# Patient Record
Sex: FEMALE | Race: WHITE | NOT HISPANIC OR LATINO | Employment: OTHER | URBAN - METROPOLITAN AREA
[De-identification: names, ages, dates, MRNs, and addresses within clinical notes are randomized per-mention and may not be internally consistent; named-entity substitution may affect disease eponyms.]

---

## 2020-06-23 ENCOUNTER — HOSPITAL ENCOUNTER (EMERGENCY)
Facility: HOSPITAL | Age: 80
Discharge: HOME | End: 2020-06-23
Attending: EMERGENCY MEDICINE | Admitting: EMERGENCY MEDICINE

## 2020-06-23 ENCOUNTER — APPOINTMENT (OUTPATIENT)
Dept: CT IMAGING | Facility: HOSPITAL | Age: 80
End: 2020-06-23

## 2020-06-23 VITALS
SYSTOLIC BLOOD PRESSURE: 108 MMHG | RESPIRATION RATE: 16 BRPM | TEMPERATURE: 98.1 F | WEIGHT: 136.69 LBS | OXYGEN SATURATION: 99 % | DIASTOLIC BLOOD PRESSURE: 62 MMHG | HEIGHT: 60 IN | BODY MASS INDEX: 26.84 KG/M2 | HEART RATE: 66 BPM
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
## 2020-06-23 DIAGNOSIS — S00.03XA CONTUSION OF SCALP, INITIAL ENCOUNTER: ICD-10-CM

## 2020-06-23 PROCEDURE — 99283 EMERGENCY DEPT VISIT LOW MDM: CPT

## 2020-06-23 PROCEDURE — 70450 CT HEAD/BRAIN W/O DYE: CPT

## 2020-06-23 NOTE — DISCHARGE INSTRUCTIONS
Neurochecks every 4 hours return for vomiting altered mental status under good pupils severe headache or any other new or worse problems or concerns

## 2020-06-23 NOTE — ED PROVIDER NOTES
Subjective   Chief complaint fall head injury    History of present illness 80-year-old female who tripped and fell this afternoon striking her head.  No loss of consciousness she denies any complaints of pain but her caregiver wanted to get checked out because she is had a history of previous aneurysm and brain surgery.  Patient offers no complaints no headache no neck pain no numbness or tingling no syncope.  This just happened a few hours ago.          Review of Systems   Constitutional: Negative for chills and fever.   Respiratory: Negative for chest tightness and shortness of breath.    Cardiovascular: Negative for chest pain and leg swelling.   Gastrointestinal: Negative for abdominal pain and vomiting.   Musculoskeletal: Negative for arthralgias and back pain.   Skin: Negative for color change and pallor.   Neurological: Negative for dizziness, speech difficulty and headaches.   Psychiatric/Behavioral: Negative for agitation and behavioral problems.       No past medical history on file.  Previous brain aneurysm repair seizures  No Known Allergies    No past surgical history on file.    No family history on file.    Social History     Socioeconomic History   • Marital status: Single     Spouse name: Not on file   • Number of children: Not on file   • Years of education: Not on file   • Highest education level: Not on file     Prior to Admission medications    Not on File   Patient does not know her medications and does not have them with her        Objective   Physical Exam  80-year-old awake alert no acute distress HEENT no obvious trauma extraocular muscle intact pupils equal and reactive there is no raccoon or bolivar sign no facial trauma.  There is no cervical rest lumbar spine tenderness trachea midline no JVD lungs clear no retractions chest wall nontender good breath sounds bilaterally heart regular without murmur abdomen soft nontender no masses pelvis stable without pain extremities full range of  motion no deformities she is awake alert knows her name knows the month knows where she is at orientated x3 she walks without ataxia there is no focal deficits [place] Coma Scale 15  Procedures           ED Course    No results found for this or any previous visit.  Ct Head Without Contrast    Result Date: 6/23/2020  Postoperative and postprocedural changes from aneurysm treatment. Extensive atrophy. Multiple areas of prior ischemic change. No convincing acute intracranial abnormality.  Electronically Signed By-Ankur Hurtado On:6/23/2020 6:37 PM This report was finalized on [id number] by  Ankur Hurtado, .    Medications - No data to display                                         MDM  Number of Diagnoses or Management Options  Contusion of scalp, initial encounter:   Fall, initial encounter:   Diagnosis management comments: Medical decision making.  Patient had the above exam and evaluation.  The patient's CT scan postoperative changes but nothing acute repeat examination at 7:00 PM she is resting comfortably no distress [place] Coma Scale 15.  She has no complaints of pain.  The patient had no neck pain she was stable to be discharged home for outpatient management we talked about head injury precautions with return for and she voiced understanding      Final diagnoses:   Fall, initial encounter   Contusion of scalp, initial encounter            Tyler Rodrigez MD  06/23/20 0435

## 2020-12-16 ENCOUNTER — APPOINTMENT (OUTPATIENT)
Dept: CT IMAGING | Facility: HOSPITAL | Age: 80
End: 2020-12-16

## 2020-12-16 ENCOUNTER — HOSPITAL ENCOUNTER (OUTPATIENT)
Facility: HOSPITAL | Age: 80
Setting detail: OBSERVATION
Discharge: HOME HEALTH | End: 2020-12-18
Attending: INTERNAL MEDICINE | Admitting: INTERNAL MEDICINE

## 2020-12-16 ENCOUNTER — APPOINTMENT (OUTPATIENT)
Dept: GENERAL RADIOLOGY | Facility: HOSPITAL | Age: 80
End: 2020-12-16

## 2020-12-16 DIAGNOSIS — M48.44XA STRESS FRACTURE OF THORACIC VERTEBRA, INITIAL ENCOUNTER: ICD-10-CM

## 2020-12-16 DIAGNOSIS — R42 DIZZY SPELLS: ICD-10-CM

## 2020-12-16 DIAGNOSIS — F03.90 DEMENTIA WITHOUT BEHAVIORAL DISTURBANCE, UNSPECIFIED DEMENTIA TYPE: ICD-10-CM

## 2020-12-16 DIAGNOSIS — N39.0 URINARY TRACT INFECTION WITHOUT HEMATURIA, SITE UNSPECIFIED: ICD-10-CM
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
## 2020-12-16 LAB
ALBUMIN SERPL-MCNC: 3.7 G/DL (ref 3.5–5.2)
ALBUMIN/GLOB SERPL: 1.3 G/DL
ALP SERPL-CCNC: 70 U/L (ref 39–117)
ALT SERPL W P-5'-P-CCNC: 10 U/L (ref 1–33)
ANION GAP SERPL CALCULATED.3IONS-SCNC: 5 MMOL/L (ref 5–15)
AST SERPL-CCNC: 17 U/L (ref 1–32)
BACTERIA UR QL AUTO: ABNORMAL /HPF
BASOPHILS # BLD AUTO: 0 10*3/MM3 (ref 0–0.2)
BASOPHILS NFR BLD AUTO: 0.6 % (ref 0–1.5)
BILIRUB SERPL-MCNC: 0.2 MG/DL (ref 0–1.2)
BILIRUB UR QL STRIP: NEGATIVE
BUN SERPL-MCNC: 21 MG/DL (ref 8–23)
BUN/CREAT SERPL: 19.8 (ref 7–25)
CALCIUM SPEC-SCNC: 9.1 MG/DL (ref 8.6–10.5)
CHLORIDE SERPL-SCNC: 104 MMOL/L (ref 98–107)
CLARITY UR: ABNORMAL
CO2 SERPL-SCNC: 23 MMOL/L (ref 22–29)
COLOR UR: YELLOW
CREAT SERPL-MCNC: 1.06 MG/DL (ref 0.57–1)
D DIMER PPP FEU-MCNC: 6.16 MG/L (FEU) (ref 0–0.59)
D-LACTATE SERPL-SCNC: 0.5 MMOL/L (ref 0.5–2)
DEPRECATED RDW RBC AUTO: 45.9 FL (ref 37–54)
EOSINOPHIL # BLD AUTO: 0 10*3/MM3 (ref 0–0.4)
EOSINOPHIL NFR BLD AUTO: 0.3 % (ref 0.3–6.2)
ERYTHROCYTE [DISTWIDTH] IN BLOOD BY AUTOMATED COUNT: 13.3 % (ref 12.3–15.4)
GFR SERPL CREATININE-BSD FRML MDRD: 50 ML/MIN/1.73
GLOBULIN UR ELPH-MCNC: 2.8 GM/DL
GLUCOSE SERPL-MCNC: 122 MG/DL (ref 65–99)
GLUCOSE UR STRIP-MCNC: NEGATIVE MG/DL
HCT VFR BLD AUTO: 30.7 % (ref 34–46.6)
HGB BLD-MCNC: 10.3 G/DL (ref 12–15.9)
HGB UR QL STRIP.AUTO: ABNORMAL
HOLD SPECIMEN: NORMAL
HOLD SPECIMEN: NORMAL
HYALINE CASTS UR QL AUTO: ABNORMAL /LPF
KETONES UR QL STRIP: NEGATIVE
LEUKOCYTE ESTERASE UR QL STRIP.AUTO: NEGATIVE
LYMPHOCYTES # BLD AUTO: 0.8 10*3/MM3 (ref 0.7–3.1)
LYMPHOCYTES NFR BLD AUTO: 14.8 % (ref 19.6–45.3)
MCH RBC QN AUTO: 33.2 PG (ref 26.6–33)
MCHC RBC AUTO-ENTMCNC: 33.5 G/DL (ref 31.5–35.7)
MCV RBC AUTO: 98.9 FL (ref 79–97)
MONOCYTES # BLD AUTO: 0.2 10*3/MM3 (ref 0.1–0.9)
MONOCYTES NFR BLD AUTO: 4.2 % (ref 5–12)
NEUTROPHILS NFR BLD AUTO: 4.4 10*3/MM3 (ref 1.7–7)
NEUTROPHILS NFR BLD AUTO: 80.1 % (ref 42.7–76)
NITRITE UR QL STRIP: NEGATIVE
NRBC BLD AUTO-RTO: 0 /100 WBC (ref 0–0.2)
NT-PROBNP SERPL-MCNC: 839.6 PG/ML (ref 0–1800)
PH UR STRIP.AUTO: 6.5 [PH] (ref 5–8)
PLATELET # BLD AUTO: 282 10*3/MM3 (ref 140–450)
PMV BLD AUTO: 7.9 FL (ref 6–12)
POTASSIUM SERPL-SCNC: 4.5 MMOL/L (ref 3.5–5.2)
PROT SERPL-MCNC: 6.5 G/DL (ref 6–8.5)
PROT UR QL STRIP: ABNORMAL
RBC # BLD AUTO: 3.11 10*6/MM3 (ref 3.77–5.28)
RBC # UR: ABNORMAL /HPF
REF LAB TEST METHOD: ABNORMAL
SARS-COV-2 RNA PNL SPEC NAA+PROBE: NORMAL
SODIUM SERPL-SCNC: 132 MMOL/L (ref 136–145)
SP GR UR STRIP: 1.01 (ref 1–1.03)
SQUAMOUS #/AREA URNS HPF: ABNORMAL /HPF
TROPONIN T SERPL-MCNC: <0.01 NG/ML (ref 0–0.03)
UROBILINOGEN UR QL STRIP: ABNORMAL
WBC # BLD AUTO: 5.5 10*3/MM3 (ref 3.4–10.8)
WBC UR QL AUTO: ABNORMAL /HPF
WHOLE BLOOD HOLD SPECIMEN: NORMAL
WHOLE BLOOD HOLD SPECIMEN: NORMAL

## 2020-12-16 PROCEDURE — G0378 HOSPITAL OBSERVATION PER HR: HCPCS

## 2020-12-16 PROCEDURE — 71045 X-RAY EXAM CHEST 1 VIEW: CPT

## 2020-12-16 PROCEDURE — 87086 URINE CULTURE/COLONY COUNT: CPT | Performed by: NURSE PRACTITIONER

## 2020-12-16 PROCEDURE — 83605 ASSAY OF LACTIC ACID: CPT

## 2020-12-16 PROCEDURE — P9612 CATHETERIZE FOR URINE SPEC: HCPCS

## 2020-12-16 PROCEDURE — 93005 ELECTROCARDIOGRAM TRACING: CPT | Performed by: NURSE PRACTITIONER

## 2020-12-16 PROCEDURE — 25010000002 CEFTRIAXONE PER 250 MG: Performed by: NURSE PRACTITIONER

## 2020-12-16 PROCEDURE — 87040 BLOOD CULTURE FOR BACTERIA: CPT | Performed by: NURSE PRACTITIONER

## 2020-12-16 PROCEDURE — 87147 CULTURE TYPE IMMUNOLOGIC: CPT | Performed by: NURSE PRACTITIONER

## 2020-12-16 PROCEDURE — 85379 FIBRIN DEGRADATION QUANT: CPT | Performed by: NURSE PRACTITIONER

## 2020-12-16 PROCEDURE — 96361 HYDRATE IV INFUSION ADD-ON: CPT

## 2020-12-16 PROCEDURE — 96376 TX/PRO/DX INJ SAME DRUG ADON: CPT

## 2020-12-16 PROCEDURE — 87076 CULTURE ANAEROBE IDENT EACH: CPT | Performed by: NURSE PRACTITIONER

## 2020-12-16 PROCEDURE — 80053 COMPREHEN METABOLIC PANEL: CPT | Performed by: NURSE PRACTITIONER

## 2020-12-16 PROCEDURE — 87077 CULTURE AEROBIC IDENTIFY: CPT | Performed by: NURSE PRACTITIONER

## 2020-12-16 PROCEDURE — 84484 ASSAY OF TROPONIN QUANT: CPT | Performed by: NURSE PRACTITIONER

## 2020-12-16 PROCEDURE — 71275 CT ANGIOGRAPHY CHEST: CPT

## 2020-12-16 PROCEDURE — 85025 COMPLETE CBC W/AUTO DIFF WBC: CPT | Performed by: NURSE PRACTITIONER

## 2020-12-16 PROCEDURE — 99285 EMERGENCY DEPT VISIT HI MDM: CPT

## 2020-12-16 PROCEDURE — 70450 CT HEAD/BRAIN W/O DYE: CPT

## 2020-12-16 PROCEDURE — 87150 DNA/RNA AMPLIFIED PROBE: CPT | Performed by: NURSE PRACTITIONER

## 2020-12-16 PROCEDURE — C9803 HOPD COVID-19 SPEC COLLECT: HCPCS

## 2020-12-16 PROCEDURE — 87186 SC STD MICRODIL/AGAR DIL: CPT | Performed by: NURSE PRACTITIONER

## 2020-12-16 PROCEDURE — 0 IOPAMIDOL PER 1 ML: Performed by: NURSE PRACTITIONER

## 2020-12-16 PROCEDURE — 81001 URINALYSIS AUTO W/SCOPE: CPT | Performed by: NURSE PRACTITIONER

## 2020-12-16 PROCEDURE — 87635 SARS-COV-2 COVID-19 AMP PRB: CPT | Performed by: NURSE PRACTITIONER

## 2020-12-16 PROCEDURE — 99220 PR INITIAL OBSERVATION CARE/DAY 70 MINUTES: CPT | Performed by: STUDENT IN AN ORGANIZED HEALTH CARE EDUCATION/TRAINING PROGRAM

## 2020-12-16 PROCEDURE — 83880 ASSAY OF NATRIURETIC PEPTIDE: CPT | Performed by: NURSE PRACTITIONER

## 2020-12-16 RX ORDER — DONEPEZIL HYDROCHLORIDE 5 MG/1
5 TABLET, FILM COATED ORAL NIGHTLY
COMMUNITY

## 2020-12-16 RX ORDER — CARBAMAZEPINE 100 MG/1
150 TABLET, CHEWABLE ORAL 2 TIMES DAILY
COMMUNITY

## 2020-12-16 RX ORDER — HYDROCODONE BITARTRATE AND ACETAMINOPHEN 5; 325 MG/1; MG/1
1 TABLET ORAL EVERY 12 HOURS PRN
COMMUNITY

## 2020-12-16 RX ORDER — GUAIFENESIN AND DEXTROMETHORPHAN HYDROBROMIDE 600; 30 MG/1; MG/1
1 TABLET, EXTENDED RELEASE ORAL 2 TIMES DAILY PRN
Status: ON HOLD | COMMUNITY
End: 2021-10-11

## 2020-12-16 RX ORDER — ONDANSETRON 4 MG/1
4 TABLET, FILM COATED ORAL EVERY 6 HOURS PRN
Status: DISCONTINUED | OUTPATIENT
Start: 2020-12-16 | End: 2020-12-18 | Stop reason: HOSPADM

## 2020-12-16 RX ORDER — AMLODIPINE BESYLATE 5 MG/1
10 TABLET ORAL DAILY
Status: DISCONTINUED | OUTPATIENT
Start: 2020-12-17 | End: 2020-12-18 | Stop reason: HOSPADM

## 2020-12-16 RX ORDER — ONDANSETRON 2 MG/ML
4 INJECTION INTRAMUSCULAR; INTRAVENOUS EVERY 6 HOURS PRN
Status: DISCONTINUED | OUTPATIENT
Start: 2020-12-16 | End: 2020-12-18 | Stop reason: HOSPADM

## 2020-12-16 RX ORDER — SODIUM CHLORIDE 0.9 % (FLUSH) 0.9 %
10 SYRINGE (ML) INJECTION EVERY 12 HOURS SCHEDULED
Status: DISCONTINUED | OUTPATIENT
Start: 2020-12-17 | End: 2020-12-18 | Stop reason: HOSPADM

## 2020-12-16 RX ORDER — ATENOLOL 50 MG/1
50 TABLET ORAL DAILY
Status: DISCONTINUED | OUTPATIENT
Start: 2020-12-17 | End: 2020-12-18 | Stop reason: HOSPADM

## 2020-12-16 RX ORDER — CARBAMAZEPINE 100 MG/1
100 CAPSULE, EXTENDED RELEASE ORAL EVERY 12 HOURS SCHEDULED
Status: DISCONTINUED | OUTPATIENT
Start: 2020-12-17 | End: 2020-12-18 | Stop reason: HOSPADM

## 2020-12-16 RX ORDER — SODIUM CHLORIDE 0.9 % (FLUSH) 0.9 %
10 SYRINGE (ML) INJECTION AS NEEDED
Status: DISCONTINUED | OUTPATIENT
Start: 2020-12-16 | End: 2020-12-18 | Stop reason: HOSPADM

## 2020-12-16 RX ORDER — GUAIFENESIN AND DEXTROMETHORPHAN HYDROBROMIDE 600; 30 MG/1; MG/1
1 TABLET, EXTENDED RELEASE ORAL 2 TIMES DAILY PRN
Status: DISCONTINUED | OUTPATIENT
Start: 2020-12-16 | End: 2020-12-18 | Stop reason: HOSPADM

## 2020-12-16 RX ORDER — ACETAMINOPHEN 650 MG/1
650 SUPPOSITORY RECTAL EVERY 4 HOURS PRN
Status: DISCONTINUED | OUTPATIENT
Start: 2020-12-16 | End: 2020-12-18 | Stop reason: HOSPADM

## 2020-12-16 RX ORDER — HYDROCODONE BITARTRATE AND ACETAMINOPHEN 7.5; 325 MG/1; MG/1
1 TABLET ORAL EVERY 8 HOURS PRN
Status: DISCONTINUED | OUTPATIENT
Start: 2020-12-16 | End: 2020-12-18 | Stop reason: HOSPADM

## 2020-12-16 RX ORDER — ACETAMINOPHEN 325 MG/1
650 TABLET ORAL EVERY 4 HOURS PRN
Status: DISCONTINUED | OUTPATIENT
Start: 2020-12-16 | End: 2020-12-18 | Stop reason: HOSPADM

## 2020-12-16 RX ORDER — ACETAMINOPHEN 160 MG/5ML
650 SOLUTION ORAL EVERY 4 HOURS PRN
Status: DISCONTINUED | OUTPATIENT
Start: 2020-12-16 | End: 2020-12-18 | Stop reason: HOSPADM

## 2020-12-16 RX ORDER — BUDESONIDE AND FORMOTEROL FUMARATE DIHYDRATE 80; 4.5 UG/1; UG/1
2 AEROSOL RESPIRATORY (INHALATION)
Status: DISCONTINUED | OUTPATIENT
Start: 2020-12-17 | End: 2020-12-18 | Stop reason: HOSPADM

## 2020-12-16 RX ORDER — ATENOLOL 50 MG/1
50 TABLET ORAL EVERY MORNING
COMMUNITY

## 2020-12-16 RX ORDER — CHOLECALCIFEROL (VITAMIN D3) 125 MCG
5 CAPSULE ORAL NIGHTLY PRN
Status: DISCONTINUED | OUTPATIENT
Start: 2020-12-16 | End: 2020-12-18 | Stop reason: HOSPADM

## 2020-12-16 RX ORDER — AMLODIPINE BESYLATE 10 MG/1
10 TABLET ORAL EVERY EVENING
COMMUNITY

## 2020-12-16 RX ORDER — DONEPEZIL HYDROCHLORIDE 5 MG/1
5 TABLET, FILM COATED ORAL NIGHTLY
Status: DISCONTINUED | OUTPATIENT
Start: 2020-12-17 | End: 2020-12-18 | Stop reason: HOSPADM

## 2020-12-16 RX ADMIN — HYDROCODONE BITARTRATE AND ACETAMINOPHEN 1 TABLET: 7.5; 325 TABLET ORAL at 23:47

## 2020-12-16 RX ADMIN — SODIUM CHLORIDE 1000 ML: 9 INJECTION, SOLUTION INTRAVENOUS at 19:19

## 2020-12-16 RX ADMIN — CEFTRIAXONE SODIUM 1 G: 10 INJECTION, POWDER, FOR SOLUTION INTRAVENOUS at 19:19

## 2020-12-16 RX ADMIN — CARBAMAZEPINE 100 MG: 100 CAPSULE, EXTENDED RELEASE ORAL at 23:51

## 2020-12-16 RX ADMIN — IOPAMIDOL 75 ML: 755 INJECTION, SOLUTION INTRAVENOUS at 19:38

## 2020-12-16 RX ADMIN — Medication 10 ML: at 23:48

## 2020-12-16 RX ADMIN — DONEPEZIL HYDROCHLORIDE 5 MG: 5 TABLET, FILM COATED ORAL at 23:48

## 2020-12-16 NOTE — ED NOTES
1813- spoke to Jesus in chemistry, he is adding-on orders to blood in the lab.      Ce Herrera  12/16/20 1814

## 2020-12-16 NOTE — ED NOTES
Pt reports getting dizzy and falling. Pt states she hit her chin and right side of her head on the table. Pt denies LOC. Pt was brought in by family to be checked out. Family states that they called 911 and EMS came to check her out and everything was fine. Family states they did not want EMS bringing her and brought her themselves.           Nidhi Mccrary RN  12/16/20 7551

## 2020-12-17 PROBLEM — E87.1 HYPONATREMIA: Status: ACTIVE | Noted: 2020-12-17

## 2020-12-17 PROBLEM — D64.9 ANEMIA: Status: ACTIVE | Noted: 2020-12-17

## 2020-12-17 LAB
ANION GAP SERPL CALCULATED.3IONS-SCNC: 7 MMOL/L (ref 5–15)
BACTERIA BLD CULT: ABNORMAL
BACTERIA ID TEST ISLT QL CULT: ABNORMAL
BASOPHILS # BLD AUTO: 0 10*3/MM3 (ref 0–0.2)
BASOPHILS NFR BLD AUTO: 0.5 % (ref 0–1.5)
BOTTLE TYPE: ABNORMAL
BUN SERPL-MCNC: 16 MG/DL (ref 8–23)
BUN/CREAT SERPL: 18 (ref 7–25)
CALCIUM SPEC-SCNC: 8.7 MG/DL (ref 8.6–10.5)
CHLORIDE SERPL-SCNC: 107 MMOL/L (ref 98–107)
CO2 SERPL-SCNC: 21 MMOL/L (ref 22–29)
CREAT SERPL-MCNC: 0.89 MG/DL (ref 0.57–1)
DEPRECATED RDW RBC AUTO: 46.4 FL (ref 37–54)
EOSINOPHIL # BLD AUTO: 0 10*3/MM3 (ref 0–0.4)
EOSINOPHIL NFR BLD AUTO: 0.3 % (ref 0.3–6.2)
ERYTHROCYTE [DISTWIDTH] IN BLOOD BY AUTOMATED COUNT: 13.4 % (ref 12.3–15.4)
FERRITIN SERPL-MCNC: 167.6 NG/ML (ref 13–150)
FOLATE SERPL-MCNC: 4.16 NG/ML (ref 4.78–24.2)
GFR SERPL CREATININE-BSD FRML MDRD: 61 ML/MIN/1.73
GLUCOSE SERPL-MCNC: 92 MG/DL (ref 65–99)
HCT VFR BLD AUTO: 28.8 % (ref 34–46.6)
HGB BLD-MCNC: 9.6 G/DL (ref 12–15.9)
LYMPHOCYTES # BLD AUTO: 1.5 10*3/MM3 (ref 0.7–3.1)
LYMPHOCYTES NFR BLD AUTO: 23.3 % (ref 19.6–45.3)
MCH RBC QN AUTO: 33.1 PG (ref 26.6–33)
MCHC RBC AUTO-ENTMCNC: 33.3 G/DL (ref 31.5–35.7)
MCV RBC AUTO: 99.4 FL (ref 79–97)
MONOCYTES # BLD AUTO: 0.5 10*3/MM3 (ref 0.1–0.9)
MONOCYTES NFR BLD AUTO: 8.2 % (ref 5–12)
NEUTROPHILS NFR BLD AUTO: 4.3 10*3/MM3 (ref 1.7–7)
NEUTROPHILS NFR BLD AUTO: 67.7 % (ref 42.7–76)
NRBC BLD AUTO-RTO: 0 /100 WBC (ref 0–0.2)
PLATELET # BLD AUTO: 256 10*3/MM3 (ref 140–450)
PMV BLD AUTO: 8 FL (ref 6–12)
POTASSIUM SERPL-SCNC: 4.5 MMOL/L (ref 3.5–5.2)
QT INTERVAL: 410 MS
RBC # BLD AUTO: 2.9 10*6/MM3 (ref 3.77–5.28)
SODIUM SERPL-SCNC: 135 MMOL/L (ref 136–145)
VIT B12 BLD-MCNC: 153 PG/ML (ref 211–946)
WBC # BLD AUTO: 6.4 10*3/MM3 (ref 3.4–10.8)

## 2020-12-17 PROCEDURE — G0378 HOSPITAL OBSERVATION PER HR: HCPCS

## 2020-12-17 PROCEDURE — 94799 UNLISTED PULMONARY SVC/PX: CPT

## 2020-12-17 PROCEDURE — 82728 ASSAY OF FERRITIN: CPT | Performed by: STUDENT IN AN ORGANIZED HEALTH CARE EDUCATION/TRAINING PROGRAM

## 2020-12-17 PROCEDURE — 99225 PR SBSQ OBSERVATION CARE/DAY 25 MINUTES: CPT | Performed by: HOSPITALIST

## 2020-12-17 PROCEDURE — 25010000002 CEFTRIAXONE PER 250 MG: Performed by: STUDENT IN AN ORGANIZED HEALTH CARE EDUCATION/TRAINING PROGRAM

## 2020-12-17 PROCEDURE — 85025 COMPLETE CBC W/AUTO DIFF WBC: CPT | Performed by: STUDENT IN AN ORGANIZED HEALTH CARE EDUCATION/TRAINING PROGRAM

## 2020-12-17 PROCEDURE — 96365 THER/PROPH/DIAG IV INF INIT: CPT

## 2020-12-17 PROCEDURE — 82607 VITAMIN B-12: CPT | Performed by: STUDENT IN AN ORGANIZED HEALTH CARE EDUCATION/TRAINING PROGRAM

## 2020-12-17 PROCEDURE — 97161 PT EVAL LOW COMPLEX 20 MIN: CPT

## 2020-12-17 PROCEDURE — 82746 ASSAY OF FOLIC ACID SERUM: CPT | Performed by: STUDENT IN AN ORGANIZED HEALTH CARE EDUCATION/TRAINING PROGRAM

## 2020-12-17 PROCEDURE — 97165 OT EVAL LOW COMPLEX 30 MIN: CPT

## 2020-12-17 PROCEDURE — 80048 BASIC METABOLIC PNL TOTAL CA: CPT | Performed by: STUDENT IN AN ORGANIZED HEALTH CARE EDUCATION/TRAINING PROGRAM

## 2020-12-17 RX ORDER — SODIUM CHLORIDE 9 MG/ML
75 INJECTION, SOLUTION INTRAVENOUS CONTINUOUS
Status: DISCONTINUED | OUTPATIENT
Start: 2020-12-17 | End: 2020-12-17

## 2020-12-17 RX ORDER — LISINOPRIL 5 MG/1
10 TABLET ORAL ONCE
Status: COMPLETED | OUTPATIENT
Start: 2020-12-17 | End: 2020-12-17

## 2020-12-17 RX ADMIN — CARBAMAZEPINE 100 MG: 100 CAPSULE, EXTENDED RELEASE ORAL at 10:00

## 2020-12-17 RX ADMIN — SERTRALINE HYDROCHLORIDE 50 MG: 50 TABLET ORAL at 10:00

## 2020-12-17 RX ADMIN — HYDROCODONE BITARTRATE AND ACETAMINOPHEN 1 TABLET: 7.5; 325 TABLET ORAL at 10:06

## 2020-12-17 RX ADMIN — AMLODIPINE BESYLATE 10 MG: 5 TABLET ORAL at 10:00

## 2020-12-17 RX ADMIN — Medication 10 ML: at 21:55

## 2020-12-17 RX ADMIN — CARBAMAZEPINE 100 MG: 100 CAPSULE, EXTENDED RELEASE ORAL at 21:54

## 2020-12-17 RX ADMIN — LISINOPRIL 10 MG: 5 TABLET ORAL at 05:02

## 2020-12-17 RX ADMIN — SODIUM CHLORIDE 75 ML/HR: 9 INJECTION, SOLUTION INTRAVENOUS at 05:02

## 2020-12-17 RX ADMIN — BUDESONIDE AND FORMOTEROL FUMARATE DIHYDRATE 2 PUFF: 80; 4.5 AEROSOL RESPIRATORY (INHALATION) at 10:40

## 2020-12-17 RX ADMIN — HYDROCODONE BITARTRATE AND ACETAMINOPHEN 1 TABLET: 7.5; 325 TABLET ORAL at 21:54

## 2020-12-17 RX ADMIN — DONEPEZIL HYDROCHLORIDE 5 MG: 5 TABLET, FILM COATED ORAL at 21:54

## 2020-12-17 RX ADMIN — ATENOLOL 50 MG: 50 TABLET ORAL at 10:00

## 2020-12-17 RX ADMIN — CEFTRIAXONE SODIUM 1 G: 1 INJECTION, POWDER, FOR SOLUTION INTRAMUSCULAR; INTRAVENOUS at 18:08

## 2020-12-17 NOTE — ED PROVIDER NOTES
Subjective   79 y/o  female presents to ER with c/o fall today after feeling dizzy.    Onset: Earlier today there is some  Location: Denies pain  Duration: Unknown  Character: Dizzy episode earlier today but patient reports it has since resolved  Aggravating/Alleviating Factors: Standing/rest  Radiation: None  Severity: Moderate            Review of Systems   Constitutional: Positive for activity change and fatigue.   HENT: Negative.    Eyes: Negative.    Respiratory: Negative.  Negative for shortness of breath.    Cardiovascular: Negative for chest pain and leg swelling.   Gastrointestinal: Negative.  Negative for abdominal pain, diarrhea, nausea and vomiting.   Endocrine: Negative.    Genitourinary: Negative.    Musculoskeletal: Negative.  Negative for arthralgias, back pain, myalgias, neck pain and neck stiffness.   Skin: Negative.  Negative for wound.   Neurological: Positive for dizziness.   Hematological: Negative.    Psychiatric/Behavioral: Negative.        Past Medical History:   Diagnosis Date   • Dementia (CMS/HCC)    • Hypertension    • Seizures (CMS/HCC)        No Known Allergies    Past Surgical History:   Procedure Laterality Date   • BACK SURGERY     • BRAIN SURGERY         Family History   Problem Relation Age of Onset   • No Known Problems Mother    • No Known Problems Father        Social History     Socioeconomic History   • Marital status: Single     Spouse name: Not on file   • Number of children: Not on file   • Years of education: Not on file   • Highest education level: Not on file   Tobacco Use   • Smoking status: Light Tobacco Smoker     Packs/day: 0.25   • Smokeless tobacco: Never Used   Substance and Sexual Activity   • Alcohol use: Never     Frequency: Never   • Drug use: Never   • Sexual activity: Defer           Objective   Physical Exam  Constitutional:       General: She is not in acute distress.     Appearance: Normal appearance. She is not ill-appearing, toxic-appearing or  diaphoretic.   HENT:      Head: Normocephalic and atraumatic.      Right Ear: Tympanic membrane, ear canal and external ear normal.      Left Ear: Tympanic membrane, ear canal and external ear normal.      Nose: Nose normal. No congestion or rhinorrhea.      Mouth/Throat:      Mouth: Mucous membranes are moist.      Pharynx: Oropharynx is clear.   Eyes:      Extraocular Movements: Extraocular movements intact.      Conjunctiva/sclera: Conjunctivae normal.      Pupils: Pupils are equal, round, and reactive to light.   Neck:      Musculoskeletal: Normal range of motion and neck supple.   Cardiovascular:      Rate and Rhythm: Normal rate and regular rhythm.      Pulses: Normal pulses.      Heart sounds: Normal heart sounds.   Pulmonary:      Effort: Pulmonary effort is normal.      Breath sounds: Normal breath sounds.   Abdominal:      General: Abdomen is flat. Bowel sounds are normal. There is no distension.      Palpations: There is no mass.      Tenderness: There is no abdominal tenderness. There is no guarding.   Musculoskeletal: Normal range of motion.         General: No swelling or tenderness.      Thoracic back: She exhibits normal range of motion, no tenderness, no bony tenderness, no swelling, no edema, no deformity, no laceration, no pain, no spasm and normal pulse.      Comments: No thoracic tenderness upon palpation.   Skin:     General: Skin is warm and dry.   Neurological:      General: No focal deficit present.      Mental Status: She is alert.      GCS: GCS eye subscore is 4. GCS verbal subscore is 5. GCS motor subscore is 6.      Sensory: Sensation is intact.      Motor: Motor function is intact.   Psychiatric:         Attention and Perception: Attention normal.         Mood and Affect: Mood and affect normal. Mood is not anxious.         Speech: Speech normal.         Behavior: Behavior is cooperative.         Cognition and Memory: Cognition is impaired. Memory is impaired.         Judgment: Judgment  is impulsive.      Comments: Pleasantly confused, oriented to person and place.         Procedures           ED Course  ED Course as of Dec 17 1443   Wed Dec 16, 2020   2030 Spoke with Grand-daughter; Kathe Lancaster about patient and ER workup.  Kathe verbalizes understanding.    [CT]   2124 Kathe Lancaster, grand-daughter - [phone]    [CT]      ED Course User Index  [CT] Traci Phipps, APRN      Ct Head Without Contrast    Result Date: 12/16/2020  No acute intracranial abnormality. Diffuse volume loss. Postoperative postprocedural changes for aneurysm treatment with multiple areas of prior ischemic change. No change from previous exam. Brain MRI is more sensitive to evaluate for acute or subacute infarcts and to evaluate for intracranial metastatic disease.  Electronically Signed By-Keerthi Tim MD On:12/16/2020 7:07 PM This report was finalized on [id number] by  Keerthi Tim MD.    Xr Chest 1 View    Result Date: 12/16/2020  No acute cardiopulmonary abnormality.  Electronically Signed By-Keerthi Tim MD On:12/16/2020 6:28 PM This report was finalized on [id number] by  Keerthi Tim MD.    Ct Chest Pulmonary Embolism    Result Date: 12/16/2020  1.No findings of pulmonary embolus. 2.1 cm posterior left upper lobe pulmonary nodule. Whole body PET/CT should be performed for better evaluation. 3.Cardiomegaly. 4.Coronary artery disease. 5.Osteopenia and compression fractures in the thoracic spine as described. 6.Diffuse bronchial wall thickening, question acute or chronic bronchitis. 7.Bile duct dilatation status post cholecystectomy. Correlate with bilirubin.    Electronically Signed By-Keerthi Tim MD On:12/16/2020 7:51 PM This report was finalized on [id number] by  Keerthi Tim MD.    Medications   sodium chloride 0.9 % flush 10 mL (has no administration in time range)   sodium chloride 0.9 % flush 10 mL (has no administration in time range)   cefTRIAXone (ROCEPHIN) 1 g in sodium chloride 0.9 % 100 mL  IVPB (has no administration in time range)   amLODIPine (NORVASC) tablet 10 mg (10 mg Oral Given 12/17/20 1000)   atenolol (TENORMIN) tablet 50 mg (50 mg Oral Given 12/17/20 1000)   carBAMazepine (CARBATROL) 12 hr capsule 100 mg (100 mg Oral Given 12/17/20 1000)   donepezil (ARICEPT) tablet 5 mg (5 mg Oral Given 12/16/20 2348)   budesonide-formoterol (SYMBICORT) 80-4.5 MCG/ACT inhaler 2 puff (2 puffs Inhalation Given 12/17/20 1040)   guaifenesin-dextromethorphan (MUCINEX DM) tablet 1 tablet (has no administration in time range)   HYDROcodone-acetaminophen (NORCO) 7.5-325 MG per tablet 1 tablet (1 tablet Oral Given 12/17/20 1006)   sertraline (ZOLOFT) tablet 50 mg (50 mg Oral Given 12/17/20 1000)   sodium chloride 0.9 % flush 10 mL (10 mL Intravenous Not Given 12/17/20 1010)   sodium chloride 0.9 % flush 10 mL (has no administration in time range)   acetaminophen (TYLENOL) tablet 650 mg (has no administration in time range)     Or   acetaminophen (TYLENOL) 160 MG/5ML solution 650 mg (has no administration in time range)     Or   acetaminophen (TYLENOL) suppository 650 mg (has no administration in time range)   melatonin tablet 5 mg (has no administration in time range)   ondansetron (ZOFRAN) tablet 4 mg (has no administration in time range)     Or   ondansetron (ZOFRAN) injection 4 mg (has no administration in time range)   cefTRIAXone (ROCEPHIN) in SWFI 1 gram/10ml IV PUSH syringe (1 g Intravenous Given 12/16/20 1919)   sodium chloride 0.9 % bolus 1,000 mL (0 mL Intravenous Stopped 12/16/20 2019)   iopamidol (ISOVUE-370) 76 % injection 75 mL (75 mL Intravenous Given 12/16/20 1938)   lisinopril (PRINIVIL,ZESTRIL) tablet 10 mg (10 mg Oral Given 12/17/20 0502)     Labs Reviewed   URINE CULTURE - Abnormal; Notable for the following components:       Result Value    Urine Culture >100,000 CFU/mL Gram Negative Bacilli (*)     All other components within normal limits   COMPREHENSIVE METABOLIC PANEL - Abnormal; Notable for  the following components:    Glucose 122 (*)     Creatinine 1.06 (*)     Sodium 132 (*)     eGFR Non  Amer 50 (*)     All other components within normal limits    Narrative:     GFR Normal >60  Chronic Kidney Disease <60  Kidney Failure <15     URINALYSIS W/ CULTURE IF INDICATED - Abnormal; Notable for the following components:    Appearance, UA Cloudy (*)     Blood, UA Trace (*)     Protein, UA >=300 mg/dL (3+) (*)     All other components within normal limits   D-DIMER, QUANTITATIVE - Abnormal; Notable for the following components:    D-Dimer, Quantitative 6.16 (*)     All other components within normal limits    Narrative:     Reference Range  --------------------------------------------------------------------     < 0.50   Negative Predictive Value  0.50-0.59   Indeterminate    >= 0.60   Probable VTE             A very low percentage of patients with DVT may yield D-Dimer results   below the cut-off of 0.50 mg/L FEU.  This is known to be more   prevalent in patients with distal DVT.             Results of this test should always be interpreted in conjunction with   the patient's medical history, clinical presentation and other   findings.  Clinical diagnosis should not be based on the result of   [place] D-Dimer alone.   CBC WITH AUTO DIFFERENTIAL - Abnormal; Notable for the following components:    RBC 3.11 (*)     Hemoglobin 10.3 (*)     Hematocrit 30.7 (*)     MCV 98.9 (*)     MCH 33.2 (*)     Neutrophil % 80.1 (*)     Lymphocyte % 14.8 (*)     Monocyte % 4.2 (*)     All other components within normal limits   URINALYSIS, MICROSCOPIC ONLY - Abnormal; Notable for the following components:    RBC, UA 0-2 (*)     WBC, UA 13-20 (*)     Bacteria, UA 3+ (*)     All other components within normal limits   BASIC METABOLIC PANEL - Abnormal; Notable for the following components:    Sodium 135 (*)     CO2 21.0 (*)     All other components within normal limits    Narrative:     GFR Normal >60  Chronic Kidney  Disease <60  Kidney Failure <15     CBC WITH AUTO DIFFERENTIAL - Abnormal; Notable for the following components:    RBC 2.90 (*)     Hemoglobin 9.6 (*)     Hematocrit 28.8 (*)     MCV 99.4 (*)     MCH 33.1 (*)     All other components within normal limits   FERRITIN - Abnormal; Notable for the following components:    Ferritin 167.60 (*)     All other components within normal limits    Narrative:     Results may be falsely decreased if patient taking Biotin.     FOLATE - Abnormal; Notable for the following components:    Folate 4.16 (*)     All other components within normal limits    Narrative:     Results may be falsely increased if patient taking Biotin.     VITAMIN B12 - Abnormal; Notable for the following components:    Vitamin B-12 153 (*)     All other components within normal limits    Narrative:     Results may be falsely increased if patient taking Biotin.     COVID-19,ABBOTT IN-HOUSE,NP SWAB (NO TRANSPORT MEDIA) 2 HR TAT - Normal    Narrative:     Fact sheet for providers: https://www.fda.gov/media/763705/download     Fact sheet for patients: https://www.fda.gov/media/636665/download    Test performed by PCR.  If inconsistent with clinical signs and symptoms patient should be tested with different authorized molecular test.   BNP (IN-HOUSE) - Normal    Narrative:     Among patients with dyspnea, NT-proBNP is highly sensitive for the detection of acute congestive heart failure. In addition NT-proBNP of <300 pg/ml effectively rules out acute congestive heart failure with 99% negative predictive value.    Results may be falsely decreased if patient taking Biotin.     TROPONIN (IN-HOUSE) - Normal    Narrative:     Troponin T Reference Range:  <= 0.03 ng/mL-   Negative for AMI  >0.03 ng/mL-     Abnormal for myocardial necrosis.  Clinicians would have to utilize clinical acumen, EKG, Troponin and serial changes to determine if it is an Acute Myocardial Infarction or myocardial injury due to an underlying  chronic condition.       Results may be falsely decreased if patient taking Biotin.     POC LACTATE - Normal   POC LACTATE - Normal   BLOOD CULTURE   BLOOD CULTURE   RAINBOW DRAW    Narrative:     The following orders were created for panel order Jansen Draw.  Procedure                               Abnormality         Status                     ---------                               -----------         ------                     Light Blue Top[[id number]]                                   Final result               Green Top (Gel)[[id number]]                                  Final result               Lavender Top[[id number]]                                     Final result               Gold Top - SST[[id number]]                                   Final result                 Please view results for these tests on the individual orders.   LIGHT BLUE TOP   GREEN TOP   LAVENDER TOP   GOLD TOP - SST   CBC AND DIFFERENTIAL    Narrative:     The following orders were created for panel order CBC & Differential.  Procedure                               Abnormality         Status                     ---------                               -----------         ------                     CBC Auto Differential[[id number]]        Abnormal            Final result                 Please view results for these tests on the individual orders.                                        MDM  Number of Diagnoses or Management Options  Dementia without behavioral disturbance, unspecified dementia type (CMS/[facility]):   Dizzy spells:   Fall, initial encounter:   Stress fracture of thoracic vertebra, initial encounter:   Urinary tract infection without hematuria, site unspecified:      Amount and/or Complexity of Data Reviewed  Clinical lab tests: reviewed  Tests in the radiology section of CPT®: reviewed  Tests in the medicine section of CPT®: reviewed      Ct Head Without Contrast    Result Date: 12/16/2020  No acute intracranial abnormality.  Diffuse volume loss. Postoperative postprocedural changes for aneurysm treatment with multiple areas of prior ischemic change. No change from previous exam. Brain MRI is more sensitive to evaluate for acute or subacute infarcts and to evaluate for intracranial metastatic disease.  Electronically Signed By-Keerthi Tim MD On:12/16/2020 7:07 PM This report was finalized on [id number] by  Keerthi Tim MD.    Xr Chest 1 View    Result Date: 12/16/2020  No acute cardiopulmonary abnormality.  Electronically Signed By-Keerthi Tim MD On:12/16/2020 6:28 PM This report was finalized on [id number] by  Keerthi Tim MD.    Ct Chest Pulmonary Embolism    Result Date: 12/16/2020  1.No findings of pulmonary embolus. 2.1 cm posterior left upper lobe pulmonary nodule. Whole body PET/CT should be performed for better evaluation. 3.Cardiomegaly. 4.Coronary artery disease. 5.Osteopenia and compression fractures in the thoracic spine as described. 6.Diffuse bronchial wall thickening, question acute or chronic bronchitis. 7.Bile duct dilatation status post cholecystectomy. Correlate with bilirubin.    Electronically Signed By-Keerthi Tim MD On:12/16/2020 7:51 PM This report was finalized on [id number] by  Keerthi Tim MD.    Medications   sodium chloride 0.9 % flush 10 mL (has no administration in time range)   sodium chloride 0.9 % flush 10 mL (has no administration in time range)   cefTRIAXone (ROCEPHIN) 1 g in sodium chloride 0.9 % 100 mL IVPB (has no administration in time range)   amLODIPine (NORVASC) tablet 10 mg (10 mg Oral Given 12/17/20 1000)   atenolol (TENORMIN) tablet 50 mg (50 mg Oral Given 12/17/20 1000)   carBAMazepine (CARBATROL) 12 hr capsule 100 mg (100 mg Oral Given 12/17/20 1000)   donepezil (ARICEPT) tablet 5 mg (5 mg Oral Given 12/16/20 2348)   budesonide-formoterol (SYMBICORT) 80-4.5 MCG/ACT inhaler 2 puff (2 puffs Inhalation Given 12/17/20 1040)   guaifenesin-dextromethorphan (MUCINEX DM) tablet 1 tablet  (has no administration in time range)   HYDROcodone-acetaminophen (NORCO) 7.5-325 MG per tablet 1 tablet (1 tablet Oral Given 12/17/20 1006)   sertraline (ZOLOFT) tablet 50 mg (50 mg Oral Given 12/17/20 1000)   sodium chloride 0.9 % flush 10 mL (10 mL Intravenous Not Given 12/17/20 1010)   sodium chloride 0.9 % flush 10 mL (has no administration in time range)   acetaminophen (TYLENOL) tablet 650 mg (has no administration in time range)     Or   acetaminophen (TYLENOL) 160 MG/5ML solution 650 mg (has no administration in time range)     Or   acetaminophen (TYLENOL) suppository 650 mg (has no administration in time range)   melatonin tablet 5 mg (has no administration in time range)   ondansetron (ZOFRAN) tablet 4 mg (has no administration in time range)     Or   ondansetron (ZOFRAN) injection 4 mg (has no administration in time range)   cefTRIAXone (ROCEPHIN) in SWFI 1 gram/10ml IV PUSH syringe (1 g Intravenous Given 12/16/20 1919)   sodium chloride 0.9 % bolus 1,000 mL (0 mL Intravenous Stopped 12/16/20 2019)   iopamidol (ISOVUE-370) 76 % injection 75 mL (75 mL Intravenous Given 12/16/20 1938)   lisinopril (PRINIVIL,ZESTRIL) tablet 10 mg (10 mg Oral Given 12/17/20 0502)       U/a is remarkable for UTI = 13-20 WBC, and 3+ bacteria, but negative for leukocytes and nitrites.  1 gram of Rocephin ordered and given via PIV.    Decision to admit due need for further evaluation of pulmonary nodule and undeterminate age of thoracic fractures vs pathological thoracic fractures.  Also, treatment with IV antibiotics for UTI.    Final diagnoses:   Fall, initial encounter   Dizzy spells   Stress fracture of thoracic vertebra, initial encounter   Dementia without behavioral disturbance, unspecified dementia type (CMS/HCC)   Urinary tract infection without hematuria, site unspecified            Traci Phipps, APRN  12/16/20 2121       Traci Phipps, APRN  12/17/20 8386

## 2020-12-17 NOTE — PLAN OF CARE
Goal Outcome Evaluation:  Plan of Care Reviewed With: patient  Progress: improving       Pt ambulating with walker and stand-by assist. Minimal c/o pain, pain treated per MAR. Pt confused at times. VSS. Will continue to monitor.

## 2020-12-17 NOTE — PLAN OF CARE
Goal Outcome Evaluation:  Plan of Care Reviewed With: patient  Progress: no change     Patient resting quietly with eyes closed. PO pain meds required. B/P has been high. Will continue to monitor.

## 2020-12-17 NOTE — H&P
[facility]      Patient Name: Maddison Vasquez  : 1940  MRN: [id number]  Primary Care Physician: Anabelle, No Known  Date of admission: 2020    Patient Care Team:  Provider, No Known as PCP - General          Subjective   History Present Illness     Chief Complaint:   Chief Complaint   Patient presents with   • Fall     fall this afternnon, pt hit chin on table, family wanted her checked out.       Ms. Vasquez is a 80 y.o. female who presents to [facility] ED with a history of dementia, seizures, and hypertension complaining of fall.  Patient is poor historian. due to dementia.  HPI information from ER report. Live-in caretaker states patient had been feeling dizzy all day and fell when she stood up from her kitchen table hitting her chin on the table.  She has had multiple falls in the last few months.    In the ED, troponin negative, .6, glucose 122, sodium 132, BUN 21, creatinine 1.06, D-dimer 6.16, hemoglobin 10.3, MCV 98.9.  X-ray of chest shows no acute cardiopulmonary abnormality.  Blood cultures pending.  UA shows trace blood, 3+ protein, 13-20 white blood cells, 3+ bacteria; urine culture pending.  EKG shows sinus rhythm with a rate of 61.  COVID-19 swab negative.  CT head shows no acute intercranial abnormality with diffuse volume loss and postoperative postprocedural changes for aneurysm treatment with multiple areas of prior ischemic change no change from previous exam.  CT chest PE protocol shows no findings of pulmonary embolus however a 1 cm posterior left upper lobe pulmonary nodule recommend a whole body PET scan, cardiomegaly, coronary artery disease, osteopenia and compression fractures in thoracic spine, diffuse bronchial wall thickening showing either acute or chronic bronchitis, and bile duct dilatation status post cholecystectomy.  Patient admitted for further evaluation and treatment.        Review of Systems   Unable  to perform ROS: dementia           Personal History     Past Medical History:   Past Medical History:   Diagnosis Date   • Dementia (CMS/HCC)    • Hypertension    • Seizures (CMS/HCC)        Surgical History:      Past Surgical History:   Procedure Laterality Date   • BRAIN SURGERY         Family History: family history includes No Known Problems in her father and mother. Otherwise pertinent FHx was reviewed and unremarkable.     Social History:  reports that she has never smoked. She has never used smokeless tobacco. She reports that she does not drink alcohol or use drugs.      Medications:  Prior to Admission medications    Not on File       Allergies:  No Known Allergies    Objective   Objective     Vital Signs  Temp:  [97.4 °F (36.3 °C)] 97.4 °F (36.3 °C)  Heart Rate:  [61-76] 65  Resp:  [16-20] 16  BP: (143-188)/() 188/116  SpO2:  [96 %-100 %] 100 %  on   ;      Body mass index is 26.89 kg/m².    Physical Exam  Vitals signs reviewed.   Constitutional:       Appearance: Normal appearance.   HENT:      Head:      Comments: Erythematous area and abrasion to right underside of chin     Nose: Nose normal.      Mouth/Throat:      Mouth: Mucous membranes are moist.      Pharynx: Oropharynx is clear.   Eyes:      Conjunctiva/sclera: Conjunctivae normal.      Pupils: Pupils are equal, round, and reactive to light.   Neck:      Musculoskeletal: Normal range of motion and neck supple.   Cardiovascular:      Rate and Rhythm: Normal rate and regular rhythm.      Pulses: Normal pulses.      Heart sounds: Normal heart sounds.   Pulmonary:      Effort: Pulmonary effort is normal.      Breath sounds: Normal breath sounds.   Abdominal:      General: Bowel sounds are normal. There is no distension.      Palpations: Abdomen is soft.   Musculoskeletal: Normal range of motion.   Skin:     General: Skin is warm and dry.      Capillary Refill: Capillary refill takes 2 to 3 seconds.      Coloration: Skin is pale.   Neurological:       Mental Status: She is alert. She is disoriented.         Results Review:  I have personally reviewed most recent cardiac tracings, lab results, microbiology results and radiology images and interpretations and agree with findings, most notably: UA and radiology results.    Results from last 7 days   Lab Units 12/16/20  1740   WBC 10*3/mm3 5.50   HEMOGLOBIN g/dL 10.3*   HEMATOCRIT % 30.7*   PLATELETS 10*3/mm3 282     Results from last 7 days   Lab Units 12/16/20  1830 12/16/20  1740   SODIUM mmol/L  --  132*   POTASSIUM mmol/L  --  4.5   CHLORIDE mmol/L  --  104   CO2 mmol/L  --  23.0   BUN mg/dL  --  21   CREATININE mg/dL  --  1.06*   GLUCOSE mg/dL  --  122*   CALCIUM mg/dL  --  9.1   ALT (SGPT) U/L  --  10   AST (SGOT) U/L  --  17   TROPONIN T ng/mL  --  <0.010   PROBNP pg/mL  --  839.6   LACTATE mmol/L 0.5  --      Estimated Creatinine Clearance: 35.7 mL/min (A) (by C-G formula based on SCr of 1.06 mg/dL (H)).  Brief Urine Lab Results  (Last result in the past 365 days)      Color   Clarity   Blood   Leuk Est   Nitrite   Protein   CREAT   Urine HCG        12/16/20 1831 Yellow Cloudy  Comment:  Result checked  Trace Negative Negative >=300 mg/dL (3+)               Microbiology Results (last 10 days)     ** No results found for the last 240 hours. **          ECG/EMG Results (most recent)     Procedure Component Value Units Date/Time    ECG 12 Lead [[id number]] Collected: 12/16/20 1838     Updated: 12/16/20 1842     QT Interval 410 ms     Narrative:      HEART RATE= 61  bpm  RR Interval= 980  ms  MO Interval= 208  ms  P Horizontal Axis= 27  deg  P Front Axis= 26  deg  QRSD Interval= 79  ms  QT Interval= 410  ms  QRS Axis= 2  deg  T Wave Axis= 21  deg  - ABNORMAL ECG -  Sinus rhythm  Supraventricular bigeminy  No previous ECG available for comparison  Electronically Signed By:   Date and Time of Study: 2020-12-16 18:38:43              Ct Head Without Contrast    Result Date: 12/16/2020  No acute intracranial  abnormality. Diffuse volume loss. Postoperative postprocedural changes for aneurysm treatment with multiple areas of prior ischemic change. No change from previous exam. Brain MRI is more sensitive to evaluate for acute or subacute infarcts and to evaluate for intracranial metastatic disease.  Electronically Signed By-Keerthi Tim MD On:12/16/2020 7:07 PM This report was finalized on [id number] by  Keerthi Tim MD.    Xr Chest 1 View    Result Date: 12/16/2020  No acute cardiopulmonary abnormality.  Electronically Signed By-Keerthi Tim MD On:12/16/2020 6:28 PM This report was finalized on [id number] by  Keerthi Tim MD.    Ct Chest Pulmonary Embolism    Result Date: 12/16/2020  1.No findings of pulmonary embolus. 2.1 cm posterior left upper lobe pulmonary nodule. Whole body PET/CT should be performed for better evaluation. 3.Cardiomegaly. 4.Coronary artery disease. 5.Osteopenia and compression fractures in the thoracic spine as described. 6.Diffuse bronchial wall thickening, question acute or chronic bronchitis. 7.Bile duct dilatation status post cholecystectomy. Correlate with bilirubin.    Electronically Signed By-Keerthi Tim MD On:12/16/2020 7:51 PM This report was finalized on [id number] by  Keerthi Tim MD.        Estimated Creatinine Clearance: 35.7 mL/min (A) (by C-G formula based on SCr of 1.06 mg/dL (H)).    Assessment/Plan   Assessment/Plan       Active Hospital Problems    Diagnosis  POA   • **Fall [W19.XXXA]  Yes     Priority: High   • Lung nodule < 6cm on CT [R91.1]  Yes     Priority: High   • Hyponatremia [E87.1]  Yes     Priority: Medium   • Anemia [D64.9]  Yes     Priority: Medium   • UTI (urinary tract infection) [N39.0]  Yes     Priority: Medium   • Thoracic spine fracture (CMS/HCC) [S22.009A]  Yes     Priority: Medium   • Hypertension [I10]  Yes   • Seizures (CMS/HCC) [R56.9]  Yes   • Dementia (CMS/HCC) [F03.90]  Yes      Resolved Hospital Problems   No resolved problems to display.      Fall, multiple recent  -PT/OT consulted    Thoracic spine compression fractures  -CT Chest shows age indeterminate but new from 2014. There is a fracture of T8  with about 70% loss of height, T5 with 50% loss of height, superior  endplate concavity T4 with about 5% height loss. No retropulsed fracture  Fragments    --likely due to falls  -PT/OT consulted    UTI  -UA reviewed  -Continue ceftriaxone    Hyponatremia, ?acute or chronic, mild  -Serum   -NS @ 75cc/hr for 8 hours, then re-evaluate  -Patient appears dehydrated  -Recheck BMP in am  -Consider renal consult if sodium level not increasing    Anemia, macrocytic  -Hgb 10.3, MCV 98.9  -Folate, B12, Ferritin  -CBC in am    Lung nodule  -Consider hematology consult, possibly outpatient follow-up    Essential Hypertension, Chronic, Controlled  -Continue home amlodipine, atenolol  - Monitor with routine vital signs     Seizure disorder  -Continue carbamazepine    Dementia  -Continue donepezil and sertraline    COPD, not in exacerbation  -Continue home inhalers  -Incentive spirometry  -Titrate O2 for sats > 90%    Chronic pain  -Inspect reviewed  -Continue Norco      VTE Prophylaxis -   Mechanical Order History:     SCDs      Pharmalogical Order History:     None          CODE STATUS:    Code Status and Medical Interventions:   Ordered at: 12/16/20 2311     Code Status:    CPR     Medical Interventions (Level of Support Prior to Arrest):    Full       This patient has been examined wearing appropriate Personal Protective Equipment. 12/16/20      I discussed the patient's findings and my recommendations with patient.      Signature:Electronically signed by JOCY Pimentel, 12/16/20, 11:16 PM EST.      [facility]ist Team

## 2020-12-17 NOTE — THERAPY EVALUATION
Patient Name: Maddison Vasquez  : 1940    MRN: [id number]                              Today's Date: 2020       Admit Date: 2020    Visit Dx:     ICD-10-CM ICD-9-CM   1. Fall, initial encounter  W19.XXXA E888.9   2. Dizzy spells  R42 780.4   3. Stress fracture of thoracic vertebra, initial encounter  M48.44XA 733.95   4. Dementia without behavioral disturbance, unspecified dementia type (CMS/HCC)  F03.90 294.20   5. Urinary tract infection without hematuria, site unspecified  N39.0 599.0     Patient Active Problem List   Diagnosis   • Fall   • Hypertension   • Seizures (CMS/HCC)   • Dementia (CMS/HCC)   • UTI (urinary tract infection)   • Lung nodule < 6cm on CT   • Thoracic spine fracture (CMS/HCC)   • Hyponatremia   • Anemia     Past Medical History:   Diagnosis Date   • Dementia (CMS/HCC)    • Hypertension    • Seizures (CMS/HCC)      Past Surgical History:   Procedure Laterality Date   • BACK SURGERY     • BRAIN SURGERY       General Information     Row Name 20 1240          Physical Therapy Time and Intention    Document Type  evaluation  -     Mode of Treatment  physical therapy  -     Row Name 20 1240          General Information    Patient Profile Reviewed  yes  -HC     Prior Level of Function  -- indep with mobility and dressing, has assist for bathing  -     Existing Precautions/Restrictions  fall  -     Barriers to Rehab  cognitive status  -     Row Name 20 1240          Living Environment    Lives With  other (see comments) lives with caregiver, has 24/7 assist  -     Row Name 20 1240          Home Main Entrance    Number of Stairs, Main Entrance  two  -     Row Name 20 1240          Cognition    Orientation Status (Cognition)  oriented to;person;place confused to situation, inconsistent with PLOF questions  -     Row Name 20 1240          Safety Issues, Functional Mobility    Safety Issues Affecting Function (Mobility)  safety  precaution awareness  -HC     Impairments Affecting Function (Mobility)  cognition;balance;strength  -       User Key  (r) = Recorded By, (t) = Taken By, (c) = Cosigned By    Initials Name Provider Type    HC Amaya Davidson, PT Physical Therapist        Mobility     Row Name 12/17/20 1242          Bed Mobility    Bed Mobility  supine-sit  -HC     Supine-Sit [place] (Bed Mobility)  minimum assist (75% patient effort)  -     Row Name 12/17/20 1242          Sit-Stand Transfer    Sit-Stand [place] (Transfers)  contact guard  -     Assistive Device (Sit-Stand Transfers)  walker, front-wheeled  -HC     Row Name 12/17/20 1242          Gait/Stairs (Locomotion)    [place] Level (Gait)  contact guard;standby assist  -HC     Assistive Device (Gait)  walker, front-wheeled  -HC     Distance in Feet (Gait)  175 ft  -HC     Deviations/Abnormal Patterns (Gait)  gait speed decreased  -     [place] Level (Stairs)  contact guard  -     Handrail Location (Stairs)  right side (ascending)  -HC     Number of Steps (Stairs)  4 steps  -HC     Ascending Technique (Stairs)  step-to-step  -HC     Descending Technique (Stairs)  step-to-step  -HC     Comment (Gait/Stairs)  gait assessed without AD with lateral sway and impaired balance noted, pt then ambulated with RW with improved balance and endurance noted; easily distracted, requires verbal cues to attention to task  -       User Key  (r) = Recorded By, (t) = Taken By, (c) = Cosigned By    Initials Name Provider Type     Amaya Davidson, PT Physical Therapist        Obj/Interventions     Row Name 12/17/20 1243          Range of Motion Comprehensive    General Range of Motion  no range of motion deficits identified  -     Row Name 12/17/20 1243          Strength Comprehensive (MMT)    Comment, General Manual Muscle Testing (MMT) Assessment  BUEs 4/5, BLEs 4/5  -     Row Name 12/17/20 1243          Balance    Balance Assessment  sitting static  balance;sitting dynamic balance;standing static balance;standing dynamic balance  -HC     Static Sitting Balance  WFL  -HC     Dynamic Sitting Balance  mild impairment  -HC     Static Standing Balance  mild impairment  -HC     Dynamic Standing Balance  mild impairment  -HC       User Key  (r) = Recorded By, (t) = Taken By, (c) = Cosigned By    Initials Name Provider Type     Amaya Davidson, PT Physical Therapist        Goals/Plan     Row Name 12/17/20 1400          Transfer Goal 1 (PT)    Activity/Assistive Device (Transfer Goal 1, PT)  transfers, all  -HC     [place] Level/Cues Needed (Transfer Goal 1, PT)  modified independence  -HC     Time Frame (Transfer Goal 1, PT)  2 weeks  -     Row Name 12/17/20 1400          Gait Training Goal 1 (PT)    Activity/Assistive Device (Gait Training Goal 1, PT)  gait (walking locomotion);assistive device use  -HC     [place] Level (Gait Training Goal 1, PT)  supervision required  -HC     Distance (Gait Training Goal 1, PT)  400 ft  -HC     Time Frame (Gait Training Goal 1, PT)  2 weeks  -HC       User Key  (r) = Recorded By, (t) = Taken By, (c) = Cosigned By    Initials Name Provider Type     Amaya Davidson, PT Physical Therapist        Clinical Impression     Row Name 12/17/20 1243          Pain    Additional Documentation  Pain Scale: FACES Pre/Post-Treatment (Group)  -     Row Name 12/17/20 1240          Pain Scale: Numbers Pre/Post-Treatment    Pain Intervention(s)  Rest;Repositioned  -     Row Name 12/17/20 1246          Pain Scale: FACES Pre/Post-Treatment    Pain: FACES Scale, Pretreatment  4-->hurts little more  -HC     Posttreatment Pain Rating  4-->hurts little more  -HC     Pre/Posttreatment Pain Comment  Pt reports some chest discomfort, RN aware  -     Row Name 12/17/20 4958          Plan of Care Review    Outcome Summary  Pt is 79 yo feamle admitted for dizziness, falls, UTI.  CT head (-) acute changes.  CT chest (-) PE, showing  pulmonary nodule, multiple thoracic compression fxs new from 2014.  Per RN, hospitalist reports compression fx are chronic.  Pt has hx of dementia and seizures.  -HC     Row Name 12/17/20 1243          Therapy Assessment/Plan (PT)    Patient/Family Therapy Goals Statement (PT)  return home  -HC     Rehab Potential (PT)  good, to achieve stated therapy goals  -HC     Criteria for Skilled Interventions Met (PT)  yes;skilled treatment is necessary  -HC     Predicted Duration of Therapy Intervention (PT)  until d/c  -HC     Row Name 12/17/20 1243          Vital Signs    Pre Systolic BP Rehab  137  -HC     Pre Treatment Diastolic BP  64  -HC     Intra Systolic BP Rehab  131  -HC     Intra Treatment Diastolic BP  70  -HC     Post Systolic BP Rehab  126  -HC     Post Treatment Diastolic BP  76  -HC     Row Name 12/17/20 1243          Positioning and Restraints    Pre-Treatment Position  in bed  -HC     Post Treatment Position  chair  -HC     In Chair  notified nsg;call light within reach;encouraged to call for assist;exit alarm on  -HC       User Key  (r) = Recorded By, (t) = Taken By, (c) = Cosigned By    Initials Name Provider Type    HC Amaya Davidson, PT Physical Therapist        Outcome Measures     Row Name 12/17/20 1401          How much help from another person do you currently need...    Turning from your back to your side while in flat bed without using bedrails?  3  -HC     Moving from lying on back to sitting on the side of a flat bed without bedrails?  3  -HC     Moving to and from a bed to a chair (including a wheelchair)?  3  -HC     Standing up from a chair using your arms (e.g., wheelchair, bedside chair)?  3  -HC     Climbing 3-5 steps with a railing?  3  -HC     To walk in hospital room?  3  -HC     AM-PAC 6 Clicks Score (PT)  18  -HC     Row Name 12/17/20 1401          Functional Assessment    Outcome Measure Options  AM-PAC 6 Clicks Basic Mobility (PT)  -HC       User Key  (r) = Recorded By, (t) =  Taken By, (c) = Cosigned By    Initials Name Provider Type     Amaya Davidson, PT Physical Therapist        Physical Therapy Education                 Title: PT OT SLP Therapies (In Progress)     Topic: Physical Therapy (In Progress)     Point: Mobility training (In Progress)     Learning Progress Summary           Patient Acceptance, E, NR by  at 12/17/2020 1401                   Point: Precautions (In Progress)     Learning Progress Summary           Patient Acceptance, E, NR by  at 12/17/2020 1401                               User Key     Initials Effective Dates Name Provider Type Discipline     04/17/20 -  Amaya Davidson, PT Physical Therapist PT              PT Recommendation and Plan  Planned Therapy Interventions (PT): balance training, neuromuscular re-education, gait training, strengthening, stair training, patient/family education, postural re-education, transfer training, bed mobility training  Plan of Care Reviewed With: patient  Progress: improving  Outcome Summary: Pt is 81 yo feamle admitted for dizziness, falls, UTI.  CT head (-) acute changes.  CT chest (-) PE, showing pulmonary nodule, multiple thoracic compression fxs new from 2014.  Per RN, hospitalist reports compression fx are chronic with no further interventions needed.  Pt has hx of dementia and seizures.  Pt able to complete bed mobility with [place], transfers with CGA, and ambulate with CGA using RW.  Gait assessed without RW initially with impaired balance and reaching for objects for support noted by pt.  Gait then assessed with RW with improvements in balance and endurance noted.  Orthostatic testing completed due to recent hx dizziness at admit with no signs of hypotension noted.  Pt with no c/o dizziness during trreatment session.  Pt appears close to functional mobility baseline.  PT recommends home with 24/7 assist, HHPT, and use of RW to increase safety with mobility.  [facility]  PPE donned:  mask, goggles, gloves.     Time Calculation:   PT Charges     Row Name 12/17/20 1405             Time Calculation    Start Time  0929  -HC      Stop Time  1000  -HC      Time Calculation (min)  31 min  -HC      PT Received On  12/17/20  -HC      PT - Next Appointment  12/18/20  -      PT Goal Re-Cert Due Date  12/31/20  -HC         Time Calculation- PT    Total Timed Code Minutes- PT  0 minute(s)  -HC        User Key  (r) = Recorded By, (t) = Taken By, (c) = Cosigned By    Initials Name Provider Type     Amaya Davidson, PT Physical Therapist        Therapy Charges for Today     Code Description Service Date Service Provider Modifiers Qty    08837846938 HC PT EVAL LOW COMPLEXITY 4 12/17/2020 Amaya Davidson, PT GP 1          PT G-Codes  Outcome Measure Options: AM-PAC 6 Clicks Basic Mobility (PT)  AM-PAC 6 Clicks Score (PT): 18    Amaya Davidson PT  12/17/2020

## 2020-12-17 NOTE — THERAPY EVALUATION
Patient Name: Maddison Vasquez  : 1940    MRN: [id number]                              Today's Date: 2020       Admit Date: 2020    Visit Dx:     ICD-10-CM ICD-9-CM   1. Fall, initial encounter  W19.XXXA E888.9   2. Dizzy spells  R42 780.4   3. Stress fracture of thoracic vertebra, initial encounter  M48.44XA 733.95   4. Dementia without behavioral disturbance, unspecified dementia type (CMS/HCC)  F03.90 294.20   5. Urinary tract infection without hematuria, site unspecified  N39.0 599.0     Patient Active Problem List   Diagnosis   • Fall   • Hypertension   • Seizures (CMS/HCC)   • Dementia (CMS/HCC)   • UTI (urinary tract infection)   • Lung nodule < 6cm on CT   • Thoracic spine fracture (CMS/HCC)   • Hyponatremia   • Anemia     Past Medical History:   Diagnosis Date   • Dementia (CMS/HCC)    • Hypertension    • Seizures (CMS/HCC)      Past Surgical History:   Procedure Laterality Date   • BACK SURGERY     • BRAIN SURGERY       General Information     Row Name 20          OT Time and Intention    Document Type  evaluation  -MP     Mode of Treatment  occupational therapy  -MP     Row Name 20          General Information    Patient Profile Reviewed  yes  -MP     Prior Level of Function  independent:;ADL's  -MP     Existing Precautions/Restrictions  fall  -MP     Row Name 20          Living Environment    Lives With  -- Pt. w/ full time caregiver  -MP     Row Name 20          Cognition    Orientation Status (Cognition)  oriented to;person;place  -MP     Row Name 20          Safety Issues, Functional Mobility    Safety Issues Affecting Function (Mobility)  insight into deficits/self-awareness;judgment  -MP       User Key  (r) = Recorded By, (t) = Taken By, (c) = Cosigned By    Initials Name Provider Type    MP Harlan Kirby OT Occupational Therapist          Mobility/ADL's     Row Name 20          Transfers    Sit-Stand  Kinney (Transfers)  supervision  -MP     Row Name 12/17/20 1659          Functional Mobility    Functional Mobility- Ind. Level  supervision required  -MP     Functional Mobility- Device  rolling walker  -MP     Row Name 12/17/20 1659          Activities of Daily Living    BADL Assessment/Intervention  other (see comments);toileting  -MP     Row Name 12/17/20 1659          Toileting Assessment/Training    Kinney Level (Toileting)  toileting skills;set up;supervision  -MP     Assistive Devices (Toileting)  commode  -MP       User Key  (r) = Recorded By, (t) = Taken By, (c) = Cosigned By    Initials Name Provider Type    Harlan Kaye OT Occupational Therapist        Obj/Interventions     Row Name 12/17/20 1700          Range of Motion Comprehensive    Comment, General Range of Motion  B shoulder AROM impacted 25-50%  -MP     Row Name 12/17/20 1700          Strength Comprehensive (MMT)    Comment, General Manual Muscle Testing (MMT) Assessment  BUE 4/5  -MP     Row Name 12/17/20 1700          Balance    Static Standing Balance  WFL;supported;standing  -MP     Dynamic Standing Balance  mild impairment;supported;standing  -MP       User Key  (r) = Recorded By, (t) = Taken By, (c) = Cosigned By    Initials Name Provider Type    Harlan Kaye OT Occupational Therapist        Goals/Plan    No documentation.       Clinical Impression     Row Name 12/17/20 1700          Pain Scale: FACES Pre/Post-Treatment    Pain: FACES Scale, Pretreatment  0-->no hurt  -MP     Posttreatment Pain Rating  0-->no hurt  -MP     Row Name 12/17/20 1700          Plan of Care Review    Plan of Care Reviewed With  patient;caregiver  -MP     Progress  no change  -     Outcome Summary  Pt. is 79 y/o female admit w/ dizziness, fall, and (+) UTI. Pt. w/ h/o dementia and sz. Pt. completes functional ambulatory AD transfer to and from bathroom w/ supervision for safety and rolling walker support, setup assist for ADLs. Pt.  appears close to baseline level of functional independence and caregiver present reports 24 hour care/assist. Recommend d/c home w/ HHPT, does not require furthur skilled OT.  -MP     Row Name 12/17/20 1700          Therapy Assessment/Plan (OT)    Rehab Potential (OT)  good, to achieve stated therapy goals  -MP     Criteria for Skilled Therapeutic Interventions Met (OT)  no;no problems identified which require skilled intervention  -MP     Therapy Frequency (OT)  evaluation only  -MP     Row Name 12/17/20 1700          Therapy Plan Review/Discharge Plan (OT)    Anticipated Discharge Disposition (OT)  home with 24/7 care  -MP     Row Name 12/17/20 1700          Vital Signs    Pre Patient Position  Standing  -MP     Intra Patient Position  Standing  -MP     Post Patient Position  Sitting  -MP     Row Name 12/17/20 1700          Positioning and Restraints    Pre-Treatment Position  bathroom  -MP     Post Treatment Position  chair  -MP     In Chair  sitting;call light within reach;encouraged to call for assist;with family/caregiver  -MP       User Key  (r) = Recorded By, (t) = Taken By, (c) = Cosigned By    Initials Name Provider Type    Harlan Kaye, OT Occupational Therapist        Outcome Measures    No documentation.         OT Recommendation and Plan  Therapy Frequency (OT): evaluation only  Plan of Care Review  Plan of Care Reviewed With: patient, caregiver  Progress: no change  Outcome Summary: Pt. is 79 y/o female admit w/ dizziness, fall, and (+) UTI. Pt. w/ h/o dementia and sz. Pt. completes functional ambulatory AD transfer to and from bathroom w/ supervision for safety and rolling walker support, setup assist for ADLs. Pt. appears close to baseline level of functional independence and caregiver present reports 24 hour care/assist. Recommend d/c home w/ HHPT, does not require furthur skilled OT.     Time Calculation:   Time Calculation- OT     Row Name 12/17/20 1701             Time Calculation- OT     OT Start Time  1405  -MP      OT Stop Time  1420  -MP      OT Time Calculation (min)  15 min  -      Total Timed Code Minutes- OT  0 minute(s)  -      OT Received On  12/17/20  -        User Key  (r) = Recorded By, (t) = Taken By, (c) = Cosigned By    Initials Name Provider Type    Harlan Kaye OT Occupational Therapist        Therapy Charges for Today     Code Description Service Date Service Provider Modifiers Qty    [id number] HC OT EVAL LOW COMPLEXITY 2 12/17/2020 Harlan Kirby OT GO 1               Harlan Kirby OT  12/17/2020

## 2020-12-17 NOTE — PLAN OF CARE
Goal Outcome Evaluation:  Plan of Care Reviewed With: patient, caregiver  Progress: no change  Outcome Summary: Pt. is 79 y/o female admit w/ dizziness, fall, and (+) UTI. Pt. w/ h/o dementia and sz. Pt. completes functional ambulatory AD transfer to and from bathroom w/ supervision for safety and rolling walker support, setup assist for ADLs. Pt. appears close to baseline level of functional independence and caregiver present reports 24 hour care/assist. Recommend d/c home w/ HHPT, does not require furthur skilled OT.    PPE: gloves, mask, face shield

## 2020-12-17 NOTE — PLAN OF CARE
Problem: Adult Inpatient Plan of Care  Goal: Plan of Care Review  Outcome: Ongoing, Progressing  Flowsheets  Taken 12/17/2020 1401  Progress: improving  Plan of Care Reviewed With: patient  Outcome Summary: Pt is 81 yo feamle admitted for dizziness, falls, UTI.  CT head (-) acute changes.  CT chest (-) PE, showing pulmonary nodule, multiple thoracic compression fxs new from 2014.  Per RN, hospitalist reports compression fx are chronic with no further interventions needed.  Pt has hx of dementia and seizures.  Pt able to complete bed mobility with Dali, transfers with CGA, and ambulate with CGA using RW.  Gait assessed without RW initially with impaired balance and reaching for objects for support noted by pt.  Gait then assessed with RW with improvements in balance and endurance noted.  Orthostatic testing completed due to recent hx dizziness at admit with no signs of hypotension noted.  Pt with no c/o dizziness during trreatment session.  Pt appears close to functional mobility baseline.  PT recommends home with 24/7 assist, HHPT, and use of RW to increase safety with mobility.  PT will follow 3x/week while at [facility].  PPE donned: mask, goggles, gloves.

## 2020-12-17 NOTE — PROGRESS NOTES
"Chief complaint fall        Subjective     Seen and examined bedside she is in no distress she denies having any back pain no chest pain no abdominal pain      Objective     Vital Signs  Visit Vitals  /56 (BP Location: Right arm, Patient Position: Lying)   Pulse 64   Temp 98 °F (36.7 °C) (Oral)   Resp 12   Ht 153.7 cm (60.5\")   Wt 53.8 kg (118 lb 9.7 oz)   SpO2 96%   BMI 22.78 kg/m²       Physical Exam:  Physical Exam   Constitutional:  oriented to person, place, and time. No distress.   HENT:   Head: Normocephalic and atraumatic.   Eyes: Conjunctivae and EOM are normal. Pupils are equal, round, and reactive to light.   Neck: No JVD present. No thyromegaly present.   Cardiovascular: Normal rate, regular rhythm, normal heart sounds and intact distal pulses. Exam reveals no gallop and no friction rub.   No murmur heard.  Pulmonary/Chest: Effort normal and breath sounds normal. No stridor. No respiratory distress.  has no wheezes.  has no rales.  exhibits no tenderness.   Abdominal: Soft. Bowel sounds are normal.  no distension and no mass. There is no tenderness. There is no rebound and no guarding. No hernia.   Musculoskeletal: Normal range of motion.  No spinal tenderness throughout neck back and lower back  Lymphadenopathy:     no cervical adenopathy.   Neurological:  alert and oriented to person, place, and time. No cranial nerve deficit or sensory deficit. exhibits normal muscle tone.   Skin: No rash noted.  not diaphoretic.   Psychiatric:  normal mood and affect.   Vitals reviewed.    Physical Exam          Results Review:    CMP:  Lab Results   Component Value Date    BUN 16 12/17/2020    CREATININE 0.89 12/17/2020    EGFRIFNONA 61 12/17/2020     (L) 12/17/2020    K 4.5 12/17/2020     12/17/2020    CALCIUM 8.7 12/17/2020    ALBUMIN 3.70 12/16/2020    BILITOT 0.2 12/16/2020    ALKPHOS 70 12/16/2020    AST 17 12/16/2020    ALT 10 12/16/2020     CBC:  Lab Results   Component Value Date    WBC " 6.40 12/17/2020    RBC 2.90 (L) 12/17/2020    HGB 9.6 (L) 12/17/2020    HCT 28.8 (L) 12/17/2020    MCV 99.4 (H) 12/17/2020    MCH 33.1 (H) 12/17/2020    MCHC 33.3 12/17/2020    RDW 13.4 12/17/2020     12/17/2020         Medication Review:     Scheduled Meds:amLODIPine, 10 mg, Oral, Daily  atenolol, 50 mg, Oral, Daily  budesonide-formoterol, 2 puff, Inhalation, BID - RT  carBAMazepine, 100 mg, Oral, Q12H  cefTRIAXone, 1 g, Intravenous, Q24H  donepezil, 5 mg, Oral, Nightly  sertraline, 50 mg, Oral, Daily  sodium chloride, 10 mL, Intravenous, Q12H      Continuous Infusions:   PRN Meds:.•  acetaminophen **OR** acetaminophen **OR** acetaminophen  •  guaifenesin-dextromethorphan  •  HYDROcodone-acetaminophen  •  melatonin  •  ondansetron **OR** ondansetron  •  sodium chloride  •  [COMPLETED] Insert peripheral IV **AND** sodium chloride  •  sodium chloride    Assessment/Plan   Fall  Appear to be mechanical  Patient denies pain anywhere particularly there is no back pain  Head CT negative  PT OT  eKG/ troponin- no acute injury pattern or arrhythmia sinus rhythm    Compression fracture  T8, T5, T4-is appear to be chronic patient is asymptomatic    Lung nodule  2.1 cm posterior left upper lobe  Follow-up with pulmonary as an outpatient    Hypertension  Continue atenolol amlodipine lisinopril    Urinary tract infection  .On ceftriaxone    Dementia  Continue Aricept    Depression  Continue Zoloft    Seizures  On Carbatrol  No evidence of seizure activity    Anemia  Hemoglobin stable asymptomatic no evidence of blood per any orifice ferritin 167  Further anemia work-up pending    Elevated D-dimer  Nonspecific no PE on CT PE study    Deconditioning  PT OT likely will need rehab placement    Plan as above discharge planning to SNF in the next 48 hours    Devante Farrar MD  12/17/20  09:52 EST

## 2020-12-18 VITALS
DIASTOLIC BLOOD PRESSURE: 72 MMHG | SYSTOLIC BLOOD PRESSURE: 138 MMHG | TEMPERATURE: 98.6 F | RESPIRATION RATE: 16 BRPM | HEART RATE: 66 BPM | WEIGHT: 118.61 LBS | HEIGHT: 61 IN | BODY MASS INDEX: 22.39 KG/M2 | OXYGEN SATURATION: 95 %
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
## 2020-12-18 LAB
ANION GAP SERPL CALCULATED.3IONS-SCNC: 7 MMOL/L (ref 5–15)
BACTERIA SPEC AEROBE CULT: ABNORMAL
BASOPHILS # BLD AUTO: 0 10*3/MM3 (ref 0–0.2)
BASOPHILS NFR BLD AUTO: 0.6 % (ref 0–1.5)
BUN SERPL-MCNC: 31 MG/DL (ref 8–23)
BUN/CREAT SERPL: 20.3 (ref 7–25)
CALCIUM SPEC-SCNC: 8.3 MG/DL (ref 8.6–10.5)
CHLORIDE SERPL-SCNC: 107 MMOL/L (ref 98–107)
CO2 SERPL-SCNC: 22 MMOL/L (ref 22–29)
CREAT SERPL-MCNC: 1.53 MG/DL (ref 0.57–1)
DEPRECATED RDW RBC AUTO: 47.3 FL (ref 37–54)
EOSINOPHIL # BLD AUTO: 0.1 10*3/MM3 (ref 0–0.4)
EOSINOPHIL NFR BLD AUTO: 1.1 % (ref 0.3–6.2)
ERYTHROCYTE [DISTWIDTH] IN BLOOD BY AUTOMATED COUNT: 13.7 % (ref 12.3–15.4)
GFR SERPL CREATININE-BSD FRML MDRD: 33 ML/MIN/1.73
GLUCOSE SERPL-MCNC: 128 MG/DL (ref 65–99)
HCT VFR BLD AUTO: 26.5 % (ref 34–46.6)
HGB BLD-MCNC: 8.8 G/DL (ref 12–15.9)
LYMPHOCYTES # BLD AUTO: 1.5 10*3/MM3 (ref 0.7–3.1)
LYMPHOCYTES NFR BLD AUTO: 25.8 % (ref 19.6–45.3)
MCH RBC QN AUTO: 32.7 PG (ref 26.6–33)
MCHC RBC AUTO-ENTMCNC: 33.2 G/DL (ref 31.5–35.7)
MCV RBC AUTO: 98.7 FL (ref 79–97)
MONOCYTES # BLD AUTO: 0.5 10*3/MM3 (ref 0.1–0.9)
MONOCYTES NFR BLD AUTO: 9.2 % (ref 5–12)
NEUTROPHILS NFR BLD AUTO: 3.7 10*3/MM3 (ref 1.7–7)
NEUTROPHILS NFR BLD AUTO: 63.3 % (ref 42.7–76)
NRBC BLD AUTO-RTO: 0 /100 WBC (ref 0–0.2)
PLATELET # BLD AUTO: 223 10*3/MM3 (ref 140–450)
PMV BLD AUTO: 8.2 FL (ref 6–12)
POTASSIUM SERPL-SCNC: 4.3 MMOL/L (ref 3.5–5.2)
RBC # BLD AUTO: 2.69 10*6/MM3 (ref 3.77–5.28)
SODIUM SERPL-SCNC: 136 MMOL/L (ref 136–145)
WBC # BLD AUTO: 5.9 10*3/MM3 (ref 3.4–10.8)

## 2020-12-18 PROCEDURE — G0378 HOSPITAL OBSERVATION PER HR: HCPCS

## 2020-12-18 PROCEDURE — 99217 PR OBSERVATION CARE DISCHARGE MANAGEMENT: CPT | Performed by: INTERNAL MEDICINE

## 2020-12-18 PROCEDURE — 85025 COMPLETE CBC W/AUTO DIFF WBC: CPT | Performed by: STUDENT IN AN ORGANIZED HEALTH CARE EDUCATION/TRAINING PROGRAM

## 2020-12-18 PROCEDURE — 97116 GAIT TRAINING THERAPY: CPT

## 2020-12-18 PROCEDURE — 94799 UNLISTED PULMONARY SVC/PX: CPT

## 2020-12-18 PROCEDURE — 80048 BASIC METABOLIC PNL TOTAL CA: CPT | Performed by: STUDENT IN AN ORGANIZED HEALTH CARE EDUCATION/TRAINING PROGRAM

## 2020-12-18 RX ORDER — NICOTINE 21 MG/24HR
1 PATCH, TRANSDERMAL 24 HOURS TRANSDERMAL
Status: DISCONTINUED | OUTPATIENT
Start: 2020-12-18 | End: 2020-12-18 | Stop reason: HOSPADM

## 2020-12-18 RX ORDER — CEPHALEXIN 500 MG/1
500 CAPSULE ORAL 2 TIMES DAILY
Qty: 14 CAPSULE | Refills: 0 | Status: SHIPPED | OUTPATIENT
Start: 2020-12-18 | End: 2021-10-10

## 2020-12-18 RX ORDER — NICOTINE 21 MG/24HR
1 PATCH, TRANSDERMAL 24 HOURS TRANSDERMAL
Status: DISCONTINUED | OUTPATIENT
Start: 2020-12-18 | End: 2020-12-18

## 2020-12-18 RX ADMIN — SERTRALINE HYDROCHLORIDE 50 MG: 50 TABLET ORAL at 10:15

## 2020-12-18 RX ADMIN — Medication 1 PATCH: at 10:17

## 2020-12-18 RX ADMIN — ATENOLOL 50 MG: 50 TABLET ORAL at 10:15

## 2020-12-18 RX ADMIN — Medication 5 MG: at 01:55

## 2020-12-18 RX ADMIN — BUDESONIDE AND FORMOTEROL FUMARATE DIHYDRATE 2 PUFF: 80; 4.5 AEROSOL RESPIRATORY (INHALATION) at 09:10

## 2020-12-18 RX ADMIN — AMLODIPINE BESYLATE 10 MG: 5 TABLET ORAL at 10:15

## 2020-12-18 RX ADMIN — CARBAMAZEPINE 100 MG: 100 CAPSULE, EXTENDED RELEASE ORAL at 10:15

## 2020-12-18 RX ADMIN — Medication 10 ML: at 10:16

## 2020-12-18 NOTE — PLAN OF CARE
Goal Outcome Evaluation:  Plan of Care Reviewed With: patient  Progress: improving  Outcome Summary: Pt able to complete bed mobility with mod indep and transfers with SUP-SBA.  Pt ambulates two bouts using RW with verbal cues for safety with directional turns.  No signs of LOB noted with use of RW.  Pt pleasantly confused.  Continue plan for home with 24/7 assist, HHPT, and use of RW.  PPE donned: mask, goggles, gloves.

## 2020-12-18 NOTE — DISCHARGE SUMMARY
Date of Admission: 12/16/2020    Date of Discharge:  12/18/2020    Length of stay:  LOS: 0 days     Presenting Problem:   Dizzy spells [R42]  Fall, initial encounter [W19.XXXA]  Stress fracture of thoracic vertebra, initial encounter [M48.44XA]  Urinary tract infection without hematuria, site unspecified [N39.0]  Dementia without behavioral disturbance, unspecified dementia type (CMS/HCC) [F03.90]      Principal and Active Diagnosis During Hospital Stay:     Active Hospital Problems    Diagnosis  POA   • Hyponatremia [E87.1]  Yes   • Anemia [D64.9]  Yes   • Hypertension [I10]  Yes   • Seizures (CMS/HCC) [R56.9]  Yes   • Dementia (CMS/HCC) [F03.90]  Yes   • Lung nodule < 6cm on CT [R91.1]  Yes   • Thoracic spine fracture (CMS/HCC) [S22.009A]  Yes      Resolved Hospital Problems    Diagnosis Date Resolved POA   • **Fall [W19.XXXA] 12/18/2020 Yes   • UTI (urinary tract infection) [N39.0] 12/18/2020 Yes       Fall  Appear to be mechanical  Patient denies pain anywhere particularly there is no back pain  Head CT negative  PT OT at home  eKG/ troponin- no acute injury pattern or arrhythmia sinus rhythm     Compression fracture  T8, T5, T4-is appear to be chronic patient is asymptomatic     Lung nodule  2.1 cm posterior left upper lobe  Follow-up outpt for PET scan consideration     Hypertension  Continue atenolol amlodipine lisinopril     Urinary tract infection (POA)  UCx E coli pan-sensitive  D/c on Keflex for course  BCx were positive but each set growing multiple orgamisms and thus likely contamination d/t poor collection (UCx and BCx do not match)     Dementia  Continue Aricept     Depression  Continue Zoloft     Seizures  On Carbatrol  No evidence of seizure activity     Anemia  Hemoglobin stable asymptomatic no evidence of blood per any orifice ferritin 167  Further anemia work-up pending     Elevated D-dimer  Nonspecific no PE on CT PE study     Deconditioning  PT OT likely will need rehab placement    Hospital  Course  Patient is a 80 y.o. female presented with above. Was admitted and PT rec HHPT. Pt has 24/7 caregiver at home, and was thus felt stable to be d/c.         Procedures Performed:as noted above         Consults:   Consults     Date and Time Order Name Status Description    12/16/2020 2037 Hospitalist (on-call MD unless specified) Completed           Pertinent Test Results:     Lab Results (last 72 hours)     Procedure Component Value Units Date/Time    Blood Culture - Blood, Arm, Right [[id number]]  (Abnormal) Collected: 12/16/20 1825    Specimen: Blood from Arm, Right Updated: 12/18/20 1153     Blood Culture Gram Positive Cocci     Gram Stain Aerobic Bottle Gram positive cocci in chains and clusters    Blood Culture - Blood, Arm, Left [[id number]]  (Abnormal) Collected: 12/16/20 1825    Specimen: Blood from Arm, Left Updated: 12/18/20 0759     Blood Culture Abnormal Stain     Gram Stain Aerobic Bottle Gram positive bacilli      Aerobic Bottle Gram positive cocci in clusters    Basic Metabolic Panel [[id number]]  (Abnormal) Collected: 12/18/20 0232    Specimen: Blood Updated: 12/18/20 0520     Glucose 128 mg/dL      BUN 31 mg/dL      Creatinine 1.53 mg/dL      Sodium 136 mmol/L      Potassium 4.3 mmol/L      Chloride 107 mmol/L      CO2 22.0 mmol/L      Calcium 8.3 mg/dL      eGFR Non African Amer 33 mL/min/1.73      BUN/Creatinine Ratio 20.3     Anion Gap 7.0 mmol/L     Narrative:      GFR Normal >60  Chronic Kidney Disease <60  Kidney Failure <15      CBC Auto Differential [[id number]]  (Abnormal) Collected: 12/18/20 0232    Specimen: Blood Updated: 12/18/20 0424     WBC 5.90 10*3/mm3      RBC 2.69 10*6/mm3      Hemoglobin 8.8 g/dL      Hematocrit 26.5 %      MCV 98.7 fL      MCH 32.7 pg      MCHC 33.2 g/dL      RDW 13.7 %      RDW-SD 47.3 fl      MPV 8.2 fL      Platelets 223 10*3/mm3      Neutrophil % 63.3 %      Lymphocyte % 25.8 %      Monocyte % 9.2 %      Eosinophil % 1.1 %      Basophil % 0.6 %       Neutrophils, Absolute 3.70 10*3/mm3      Lymphocytes, Absolute 1.50 10*3/mm3      Monocytes, Absolute 0.50 10*3/mm3      Eosinophils, Absolute 0.10 10*3/mm3      Basophils, Absolute 0.00 10*3/mm3      nRBC 0.0 /100 WBC     Urine Culture - Urine, Urine, Catheter In/Out [[id number]]  (Abnormal)  (Susceptibility) Collected: 12/16/20 1831    Specimen: Urine, Catheter In/Out Updated: 12/18/20 0207     Urine Culture >100,000 CFU/mL Escherichia coli    Susceptibility      Escherichia coli     ANGELITO     Ampicillin Susceptible     Ampicillin + Sulbactam Susceptible     Cefazolin Susceptible     Cefepime Susceptible     Ceftazidime Susceptible     Ceftriaxone Susceptible     Gentamicin Susceptible     Levofloxacin Susceptible     Nitrofurantoin Susceptible     Piperacillin + Tazobactam Susceptible     Tetracycline Susceptible     Trimethoprim + Sulfamethoxazole Susceptible                    Blood Culture ID, PCR - Blood, Arm, Right [[id number]]  (Abnormal) Collected: 12/16/20 1825    Specimen: Blood from Arm, Right Updated: 12/17/20 1703     BCID, PCR Staphylococcus spp, not aureus. Identification by BCID PCR.     BCID, PCR 2 Streptococcus spp, not A, B, or pneumoniae. Identification by BCID PCR.     BOTTLE TYPE Aerobic Bottle    Folate [[id number]]  (Abnormal) Collected: 12/17/20 0323    Specimen: Blood Updated: 12/17/20 1105     Folate 4.16 ng/mL     Narrative:      Results may be falsely increased if patient taking Biotin.      Vitamin B12 [[id number]]  (Abnormal) Collected: 12/17/20 0323    Specimen: Blood Updated: 12/17/20 1105     Vitamin B-12 153 pg/mL     Narrative:      Results may be falsely increased if patient taking Biotin.      Ferritin [[id number]]  (Abnormal) Collected: 12/17/20 0323    Specimen: Blood Updated: 12/17/20 0434     Ferritin 167.60 ng/mL     Narrative:      Results may be falsely decreased if patient taking Biotin.      Basic Metabolic Panel [[id number]]  (Abnormal) Collected: 12/17/20 0323    Specimen:  Blood Updated: 12/17/20 0428     Glucose 92 mg/dL      BUN 16 mg/dL      Creatinine 0.89 mg/dL      Sodium 135 mmol/L      Potassium 4.5 mmol/L      Comment: Slight hemolysis detected by analyzer. Results may be affected.        Chloride 107 mmol/L      CO2 21.0 mmol/L      Calcium 8.7 mg/dL      eGFR Non African Amer 61 mL/min/1.73      BUN/Creatinine Ratio 18.0     Anion Gap 7.0 mmol/L     Narrative:      GFR Normal >60  Chronic Kidney Disease <60  Kidney Failure <15      CBC Auto Differential [[id number]]  (Abnormal) Collected: 12/17/20 0323    Specimen: Blood Updated: 12/17/20 0406     WBC 6.40 10*3/mm3      RBC 2.90 10*6/mm3      Hemoglobin 9.6 g/dL      Hematocrit 28.8 %      MCV 99.4 fL      MCH 33.1 pg      MCHC 33.3 g/dL      RDW 13.4 %      RDW-SD 46.4 fl      MPV 8.0 fL      Platelets 256 10*3/mm3      Neutrophil % 67.7 %      Lymphocyte % 23.3 %      Monocyte % 8.2 %      Eosinophil % 0.3 %      Basophil % 0.5 %      Neutrophils, Absolute 4.30 10*3/mm3      Lymphocytes, Absolute 1.50 10*3/mm3      Monocytes, Absolute 0.50 10*3/mm3      Eosinophils, Absolute 0.00 10*3/mm3      Basophils, Absolute 0.00 10*3/mm3      nRBC 0.0 /100 WBC     COVID-19, [facility] IN-HOUSE,NP Swab (NO TRANSPORT MEDIA) 2 HR TAT - Swab, Nasopharynx [[id number]]  (Normal) Collected: 12/16/20 2042    Specimen: Swab from Nasopharynx Updated: 12/16/20 2104     COVID19 Presumptive Negative    Narrative:      Fact sheet for providers: https://www.fda.gov/media/275770/download     Fact sheet for patients: https://www.fda.gov/media/967800/download    Test performed by PCR.  If inconsistent with clinical signs and symptoms patient should be tested with different authorized molecular test.    D-dimer, Quantitative [[id number]]  (Abnormal) Collected: 12/16/20 1740    Specimen: Blood Updated: 12/16/20 1912     D-Dimer, Quantitative 6.16 mg/L (FEU)     Narrative:      Reference  Range  --------------------------------------------------------------------     < 0.50   Negative Predictive Value  0.50-0.59   Indeterminate    >= 0.60   Probable VTE             A very low percentage of patients with DVT may yield D-Dimer results   below the cut-off of 0.50 mg/L FEU.  This is known to be more   prevalent in patients with distal DVT.             Results of this test should always be interpreted in conjunction with   the patient's medical history, clinical presentation and other   findings.  Clinical diagnosis should not be based on the result of   IN[place]OVANCE D-Dimer alone.    Urinalysis, Microscopic Only - Urine, Catheter In/Out [[id number]]  (Abnormal) Collected: 12/16/20 1831    Specimen: Urine, Catheter In/Out Updated: 12/16/20 1854     RBC, UA 0-2 /HPF      WBC, UA 13-20 /HPF      Bacteria, UA 3+ /HPF      Squamous Epithelial Cells, UA 0-2 /HPF      Hyaline Casts, UA None Seen /LPF      Methodology Automated Microscopy    Urinalysis With Culture If Indicated - Urine, Catheter In/Out [[id number]]  (Abnormal) Collected: 12/16/20 1831    Specimen: Urine, Catheter In/Out Updated: 12/16/20 1854     Color, UA Yellow     Appearance, UA Cloudy     Comment: Result checked         pH, UA 6.5     Specific Gravity, UA 1.010     Glucose, UA Negative     Ketones, UA Negative     Bilirubin, UA Negative     Blood, UA Trace     Protein, UA >=300 mg/dL (3+)     Leuk Esterase, UA Negative     Nitrite, UA Negative     Urobilinogen, UA 0.2 E.U./dL    [place] Draw [[id number]] Collected: 12/16/20 1740    Specimen: Blood Updated: 12/16/20 1846    Narrative:      The following orders were created for panel order [place] Draw.  Procedure                               Abnormality         Status                     ---------                               -----------         ------                     Light Blue Top[[id number]]                                   Final result               Green Top (Gel)[[id number]]                                   Final result               Lavender Top[[id number]]                                     Final result               Gold Top - SST[[id number]]                                   Final result                 Please view results for these tests on the individual orders.    Light Blue Top [[id number]] Collected: 12/16/20 1740    Specimen: Blood Updated: 12/16/20 1846     Extra Tube hold for add-on     Comment: Auto resulted       Green Top (Gel) [[id number]] Collected: 12/16/20 1740    Specimen: Blood Updated: 12/16/20 1846     Extra Tube Hold for add-ons.     Comment: Auto resulted.       Lavender Top [[id number]] Collected: 12/16/20 1740    Specimen: Blood Updated: 12/16/20 1846     Extra Tube hold for add-on     Comment: Auto resulted       Gold Top - SST [[id number]] Collected: 12/16/20 1740    Specimen: Blood Updated: 12/16/20 1846     Extra Tube Hold for add-ons.     Comment: Auto resulted.       CBC & Differential [[id number]]  (Abnormal) Collected: 12/16/20 1740    Specimen: Blood Updated: 12/16/20 1831    Narrative:      The following orders were created for panel order CBC & Differential.  Procedure                               Abnormality         Status                     ---------                               -----------         ------                     CBC Auto Differential[[id number]]        Abnormal            Final result                 Please view results for these tests on the individual orders.    CBC Auto Differential [[id number]]  (Abnormal) Collected: 12/16/20 1740    Specimen: Blood Updated: 12/16/20 1831     WBC 5.50 10*3/mm3      RBC 3.11 10*6/mm3      Hemoglobin 10.3 g/dL      Hematocrit 30.7 %      MCV 98.9 fL      MCH 33.2 pg      MCHC 33.5 g/dL      RDW 13.3 %      RDW-SD 45.9 fl      MPV 7.9 fL      Platelets 282 10*3/mm3      Neutrophil % 80.1 %      Lymphocyte % 14.8 %      Monocyte % 4.2 %      Eosinophil % 0.3 %      Basophil % 0.6 %      Neutrophils, Absolute 4.40  10*3/mm3      Lymphocytes, Absolute 0.80 10*3/mm3      Monocytes, Absolute 0.20 10*3/mm3      Eosinophils, Absolute 0.00 10*3/mm3      Basophils, Absolute 0.00 10*3/mm3      nRBC 0.0 /100 WBC     POC Lactate [[id number]]  (Normal) Collected: 12/16/20 1830    Specimen: Blood Updated: 12/16/20 1831     Lactate 0.5 mmol/L      Comment: Serial Number: [id number]:  [id number]       POC Lactate [[id number]]  (Normal) Collected: 12/16/20 1831    Specimen: Blood Updated: 12/16/20 1831    Comprehensive Metabolic Panel [[id number]]  (Abnormal) Collected: 12/16/20 1740    Specimen: Blood Updated: 12/16/20 1829     Glucose 122 mg/dL      BUN 21 mg/dL      Creatinine 1.06 mg/dL      Sodium 132 mmol/L      Potassium 4.5 mmol/L      Comment: Slight hemolysis detected by analyzer. Results may be affected.        Chloride 104 mmol/L      CO2 23.0 mmol/L      Calcium 9.1 mg/dL      Total Protein 6.5 g/dL      Albumin 3.70 g/dL      ALT (SGPT) 10 U/L      AST (SGOT) 17 U/L      Alkaline Phosphatase 70 U/L      Total Bilirubin 0.2 mg/dL      eGFR Non African Amer 50 mL/min/1.73      Globulin 2.8 gm/dL      A/G Ratio 1.3 g/dL      BUN/Creatinine Ratio 19.8     Anion Gap 5.0 mmol/L     Narrative:      GFR Normal >60  Chronic Kidney Disease <60  Kidney Failure <15      Troponin [[id number]]  (Normal) Collected: 12/16/20 1740    Specimen: Blood Updated: 12/16/20 1829     Troponin T <0.010 ng/mL     Narrative:      Troponin T Reference Range:  <= 0.03 ng/mL-   Negative for AMI  >0.03 ng/mL-     Abnormal for myocardial necrosis.  Clinicians would have to utilize clinical acumen, EKG, Troponin and serial changes to determine if it is an Acute Myocardial Infarction or myocardial injury due to an underlying chronic condition.       Results may be falsely decreased if patient taking Biotin.      BNP [[id number]]  (Normal) Collected: 12/16/20 1740    Specimen: Blood Updated: 12/16/20 1827     proBNP 839.6 pg/mL     Narrative:      Among patients  with dyspnea, NT-proBNP is highly sensitive for the detection of acute congestive heart failure. In addition NT-proBNP of <300 pg/ml effectively rules out acute congestive heart failure with 99% negative predictive value.    Results may be falsely decreased if patient taking Biotin.                 Microbiology Results (last 10 days)     Procedure Component Value - Date/Time    COVID-19, [facility],NP Swab (NO TRANSPORT MEDIA) 2 HR TAT - Swab, Nasopharynx [[id number]]  (Normal) Collected: 12/16/20 2042    Lab Status: Final result Specimen: Swab from Nasopharynx Updated: 12/16/20 2104     COVID19 Presumptive Negative    Narrative:      Fact sheet for providers: https://www.fda.gov/media/510330/download     Fact sheet for patients: https://www.fda.gov/media/378968/download    Test performed by PCR.  If inconsistent with clinical signs and symptoms patient should be tested with different authorized molecular test.    Urine Culture - Urine, Urine, Catheter In/Out [[id number]]  (Abnormal)  (Susceptibility) Collected: 12/16/20 1831    Lab Status: Final result Specimen: Urine, Catheter In/Out Updated: 12/18/20 0207     Urine Culture >100,000 CFU/mL Escherichia coli    Susceptibility      Escherichia coli     ANGELITO     Ampicillin Susceptible     Ampicillin + Sulbactam Susceptible     Cefazolin Susceptible     Cefepime Susceptible     Ceftazidime Susceptible     Ceftriaxone Susceptible     Gentamicin Susceptible     Levofloxacin Susceptible     Nitrofurantoin Susceptible     Piperacillin + Tazobactam Susceptible     Tetracycline Susceptible     Trimethoprim + Sulfamethoxazole Susceptible                    Blood Culture - Blood, Arm, Right [[id number]]  (Abnormal) Collected: 12/16/20 1825    Lab Status: Preliminary result Specimen: Blood from Arm, Right Updated: 12/18/20 1153     Blood Culture Gram Positive Cocci     Gram Stain Aerobic Bottle Gram positive cocci in chains and clusters    Blood Culture - Blood, Arm, Left  [[id number]]  (Abnormal) Collected: 12/16/20 1825    Lab Status: Preliminary result Specimen: Blood from Arm, Left Updated: 12/18/20 0759     Blood Culture Abnormal Stain     Gram Stain Aerobic Bottle Gram positive bacilli      Aerobic Bottle Gram positive cocci in clusters    Blood Culture ID, PCR - Blood, Arm, Right [[id number]]  (Abnormal) Collected: 12/16/20 1825    Lab Status: Final result Specimen: Blood from Arm, Right Updated: 12/17/20 1703     BCID, PCR Staphylococcus spp, not aureus. Identification by BCID PCR.     BCID, PCR 2 Streptococcus spp, not A, B, or pneumoniae. Identification by BCID PCR.     BOTTLE TYPE Aerobic Bottle                 Imaging Results (All)     Procedure Component Value Units Date/Time    CT Chest Pulmonary Embolism [[id number]] Collected: 12/16/20 1946     Updated: 12/16/20 1953    Narrative:      CT CHEST PULMONARY EMBOLISM-     Date of Exam: 12/16/2020 7:35 PM     Indication: PE suspected, high prob.  Shortness of breath.     Comparison: Chest radiograph 12/16/2020     Technique: Serial and axial CT images of the chest were obtained  following the uneventful intravenous administration of 75 cc Isovue-370  contrast. Reconstructions in the coronal and sagittal planes were also  performed.  Automated exposure control and iterative reconstruction  methods were used.     FINDINGS:     Pulmonary Arteries: Excellent contrast opacification of the pulmonary  arteries.  No intraluminal filling defects to suggest pulmonary embolus.   The pulmonary arteries are normal in caliber.        Hilum and Mediastinum: No pathologically enlarged lymph nodes.  Cardiomegaly. Moderate coronary artery calcification.. Unremarkable  thoracic aorta.   No pericardial effusion.     Lung Parenchyma and Pleura: No focal consolidations. 1 cm pleural-based  pulmonary nodule in the posterior left apex. Mild subsegmental  atelectasis of the medial lower lobes. Mild diffuse bronchial wall  thickening. Scattered  calcified granulomas. No endobronchial lesions.   No significant pleural effusions.     Upper Abdomen: Small hiatal hernia. Status post cholecystectomy. Mild  bile duct dilatation. Correlate with bilirubin.     Soft tissues: Unremarkable.     Osseous structures: No aggressive focal lytic or sclerotic osseous  lesions. Osteopenia. Compression fracture of multiple thoracic vertebral  bodies, age indeterminate but new from 2014. There is a fracture of T8  with about 70% loss of height, T5 with 50% loss of height, superior  endplate concavity T4 with about 5% height loss. No retropulsed fracture  fragments.       Impression:      1.No findings of pulmonary embolus.  2.1 cm posterior left upper lobe pulmonary nodule. Whole body PET/CT  should be performed for better evaluation.  3.Cardiomegaly.  4.Coronary artery disease.  5.Osteopenia and compression fractures in the thoracic spine as  described.  6.Diffuse bronchial wall thickening, question acute or chronic  bronchitis.  7.Bile duct dilatation status post cholecystectomy. Correlate with  bilirubin.           Electronically Signed By-Keerthi Tim MD On:12/16/2020 7:51 PM  This report was finalized on [id number] by  Keerthi Tim MD.    CT Head Without Contrast [[id number]] Collected: 12/16/20 1904     Updated: 12/16/20 1909    Narrative:      CT HEAD WO CONTRAST-     Date of Exam: 12/16/2020 6:50 PM     Indication: dizzy and fall.  Hit chin.     Comparison: CT head 06/23/2020     Technique:  Without contrast, contiguous axial CT images of the head  were obtained from skull base to vertex.  Coronal and sagittal  reconstructions were performed.  Automated exposure control and  iterative reconstruction methods were used.     FINDINGS  No intracranial hemorrhage. 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 Also in the posterior circulation. Severe volume loss. Mastoid  air cells and paranasal sinuses are well aerated. Craniotomy defects the  right frontal temporal lobe.       Impression:      No acute intracranial abnormality. Diffuse volume loss. Postoperative  postprocedural changes for aneurysm treatment with multiple areas of  prior ischemic change. No change from previous exam.  Brain MRI is more sensitive to evaluate for acute or subacute infarcts  and to evaluate for intracranial metastatic disease.     Electronically Signed By-Keerthi Tim MD On:12/16/2020 7:07 PM  This report was finalized on [id number] by  eKerthi Tim MD.    XR Chest 1 View [[id number]] Collected: 12/16/20 1827     Updated: 12/16/20 1830    Narrative:      DATE OF EXAM:  12/16/2020 6:14 PM     PROCEDURE:  XR CHEST 1 VW-     INDICATIONS:  dizzy  fall.     COMPARISON:  02/07/2014     TECHNIQUE:   Single radiographic view of the chest was obtained.     FINDINGS:  Lungs are normally expanded. Cardiomegaly. No pneumothorax or pleural  effusion or focal pulmonary parenchymal opacity. Pulmonary vessels are  distinct. Bones and soft tissues are normal.       Impression:      No acute cardiopulmonary abnormality.     Electronically Signed By-Keerthi Tim MD On:12/16/2020 6:28 PM  This report was finalized on [id number] by  Keerthi Tim MD.            Condition on Discharge:  Stable     Vital Signs  Temp:  [98.1 °F (36.7 °C)-98.7 °F (37.1 °C)] 98.7 °F (37.1 °C)  Heart Rate:  [59-67] 62  Resp:  [13-18] 18  BP: (108-153)/(63-73) 144/73    Physical Exam:  Physical Exam  Constitutional:       General: She is not in acute distress.  Cardiovascular:      Rate and Rhythm: Normal rate.   Pulmonary:      Effort: No respiratory distress.   Abdominal:      Palpations: Abdomen is soft.   Skin:     General: Skin is warm.   Neurological:      Mental Status: She is alert. Mental status is at baseline.         [place] [facility] [place] [facility] [place]     Discharge Medications      New Medications      Instructions Start Date   cephalexin 500 MG capsule  Commonly known as: Keflex   500 mg, Oral, 2 Times Daily         Continue These Medications      Instructions Start Date   amLODIPine 10 MG tablet  Commonly known as: NORVASC   10 mg, Oral, Daily      atenolol 50 MG tablet  Commonly known as: TENORMIN   50 mg, Oral, Daily      Breo Ellipta 100-25 MCG/INH inhaler  Generic drug: Fluticasone Furoate-Vilanterol   1 puff, Inhalation, Daily - RT      carBAMazepine 100 MG chewable tablet  Commonly known as: TEGretol   150 mg, Oral, 2 Times Daily      donepezil 5 MG tablet  Commonly known as: ARICEPT   5 mg, Oral, Nightly      guaifenesin-dextromethorphan  MG tablet sustained-release 12 hour tablet   1 tablet, Oral, 2 Times Daily PRN      HYDROcodone-acetaminophen 7.5-325 MG per tablet  Commonly known as: NORCO   1 tablet, Oral, Every 8 Hours PRN      sertraline 50 MG tablet  Commonly known as: ZOLOFT   50 mg, Oral, Daily             Discharge Diet:   Diet Instructions     Diet: Cardiac      Discharge Diet: Cardiac          Activity at Discharge:     Follow-up Appointments  No future appointments.  Additional Instructions for the Follow-ups that You Need to Schedule     Discharge Follow-up with PCP   As directed       Currently Documented PCP:    Provider, No Known    PCP Phone Number:    None     Follow Up Details: MD2u one week               Test Results Pending at Discharge  Pending Labs     Order Current Status    Blood Culture - Blood, Arm, Left Preliminary result    Blood Culture - Blood, Arm, Right Preliminary result           Risk for Readmission (LACE) Score: 6 (12/18/2020  6:00 AM)      This patient has been examined wearing appropriate Personal Protective Equipment . 12/18/20          Electronically signed by Ankit Martin DO, 12/18/20, 12:01 EST.

## 2020-12-18 NOTE — THERAPY TREATMENT NOTE
Patient Name: Maddison Vasquez  : 1940    MRN: [id number]                              Today's Date: 2020       Admit Date: 2020    Visit Dx:     ICD-10-CM ICD-9-CM   1. Fall, initial encounter  W19.XXXA E888.9   2. Dizzy spells  R42 780.4   3. Stress fracture of thoracic vertebra, initial encounter  M48.44XA 733.95   4. Dementia without behavioral disturbance, unspecified dementia type (CMS/HCC)  F03.90 294.20   5. Urinary tract infection without hematuria, site unspecified  N39.0 599.0     Patient Active Problem List   Diagnosis   • Fall   • Hypertension   • Seizures (CMS/HCC)   • Dementia (CMS/HCC)   • UTI (urinary tract infection)   • Lung nodule < 6cm on CT   • Thoracic spine fracture (CMS/HCC)   • Hyponatremia   • Anemia     Past Medical History:   Diagnosis Date   • Dementia (CMS/HCC)    • Hypertension    • Seizures (CMS/HCC)      Past Surgical History:   Procedure Laterality Date   • BACK SURGERY     • BRAIN SURGERY       General Information     Row Name 20 1109          Physical Therapy Time and Intention    Document Type  therapy note (daily note)  -     Mode of Treatment  physical therapy  -     Row Name 20 1109          General Information    Patient Profile Reviewed  yes  -     Existing Precautions/Restrictions  fall  -     Barriers to Rehab  cognitive status  -     Row Name 20 1109          Cognition    Orientation Status (Cognition)  oriented to;person;place  -     Row Name 20 1109          Safety Issues, Functional Mobility    Impairments Affecting Function (Mobility)  balance;cognition;strength  -       User Key  (r) = Recorded By, (t) = Taken By, (c) = Cosigned By    Initials Name Provider Type    HC Amaya Davidson, PT Physical Therapist        Mobility     Row Name 20 1109          Bed Mobility    Bed Mobility  supine-sit;sit-supine  -HC     Supine-Sit [place] (Bed Mobility)  modified independence  -     Sit-Supine  [place] (Bed Mobility)  modified independence  -     Assistive Device (Bed Mobility)  bed rails  -HC     Row Name 12/18/20 1109          Sit-Stand Transfer    Sit-Stand [place] (Transfers)  supervision  -     Assistive Device (Sit-Stand Transfers)  walker, front-wheeled  -HC     Row Name 12/18/20 1109          Gait/Stairs (Locomotion)    [place] Level (Gait)  standby assist  -HC     Assistive Device (Gait)  walker, front-wheeled  -HC     Distance in Feet (Gait)  2x200 ft  -HC     Deviations/Abnormal Patterns (Gait)  gait speed decreased  -HC     Comment (Gait/Stairs)  SBA for safety with ambulation using RW, verbal cues for directional turns, no signs of LOB noted with use of RW  -HC       User Key  (r) = Recorded By, (t) = Taken By, (c) = Cosigned By    Initials Name Provider Type    Amaya Kumar, PT Physical Therapist        Obj/Interventions     Row Name 12/18/20 1110          Balance    Balance Assessment  sitting static balance;sitting dynamic balance;standing static balance;standing dynamic balance  -HC     Static Sitting Balance  WFL  -HC     Dynamic Sitting Balance  WFL  -HC     Static Standing Balance  WFL;supported  -HC     Dynamic Standing Balance  WFL;supported  -HC       User Key  (r) = Recorded By, (t) = Taken By, (c) = Cosigned By    Initials Name Provider Type    Amaya Kumar, PT Physical Therapist        Goals/Plan     Row Name 12/18/20 1110          Transfer Goal 1 (PT)    Progress/Outcome (Transfer Goal 1, PT)  continuing progress toward goal  -HC     Row Name 12/18/20 1110          Gait Training Goal 1 (PT)    Progress/Outcome (Gait Training Goal 1, PT)  continuing progress toward goal  -HC       User Key  (r) = Recorded By, (t) = Taken By, (c) = Cosigned By    Initials Name Provider Type    Amaya Kumar, PT Physical Therapist        Clinical Impression     Row Name 12/18/20 1110          Pain    Additional Documentation  Pain Scale: Numbers  Pre/Post-Treatment (Group)  -HC     Row Name 12/18/20 1110          Pain Scale: Numbers Pre/Post-Treatment    Pretreatment Pain Rating  0/10 - no pain  -HC     Posttreatment Pain Rating  0/10 - no pain  -HC     Row Name 12/18/20 1110          Therapy Assessment/Plan (PT)    Rehab Potential (PT)  good, to achieve stated therapy goals  -     Criteria for Skilled Interventions Met (PT)  yes  -HC     Row Name 12/18/20 1110          Positioning and Restraints    Pre-Treatment Position  in bed  -HC     Post Treatment Position  bed  -HC     In Bed  notified nsg;call light within reach;exit alarm on;encouraged to call for assist  -HC       User Key  (r) = Recorded By, (t) = Taken By, (c) = Cosigned By    Initials Name Provider Type    Amaya Kumar, PT Physical Therapist        Outcome Measures     Row Name 12/18/20 1111          How much help from another person do you currently need...    Turning from your back to your side while in flat bed without using bedrails?  4  -HC     Moving from lying on back to sitting on the side of a flat bed without bedrails?  4  -HC     Moving to and from a bed to a chair (including a wheelchair)?  3  -HC     Standing up from a chair using your arms (e.g., wheelchair, bedside chair)?  3  -HC     Climbing 3-5 steps with a railing?  3  -HC     To walk in hospital room?  3  -HC     AM-PAC 6 Clicks Score (PT)  20  -HC     Row Name 12/18/20 1111          Functional Assessment    Outcome Measure Options  AM-PAC 6 Clicks Basic Mobility (PT)  -HC       User Key  (r) = Recorded By, (t) = Taken By, (c) = Cosigned By    Initials Name Provider Type    Amaya Kumar, PT Physical Therapist        Physical Therapy Education                 Title: PT OT SLP Therapies (Done)     Topic: Physical Therapy (Done)     Point: Mobility training (Done)     Learning Progress Summary           Patient Acceptance, E, VU by  at 12/18/2020 1111    Acceptance, E, NR by  at 12/17/2020 1401                    Point: Precautions (Done)     Learning Progress Summary           Patient Acceptance, E, VU by  at 12/18/2020 1111    Acceptance, E, NR by  at 12/17/2020 1401                               User Key     Initials Effective Dates Name Provider Type [facility] 04/17/20 -  Amaya Davidson, PT Physical Therapist PT              PT Recommendation and Plan  Planned Therapy Interventions (PT): balance training, neuromuscular re-education, gait training, strengthening, stair training, patient/family education, postural re-education, transfer training, bed mobility training  Plan of Care Reviewed With: patient  Progress: improving  Outcome Summary: Pt able to complete bed mobility with mod indep and transfers with SUP-SBA.  Pt ambulates two bouts using RW with verbal cues for safety with directional turns.  No signs of LOB noted with use of RW.  Pt pleasantly confused.  Continue plan for home with 24/7 assist, HHPT, and use of RW.  PPE donned: mask, goggles, gloves.     Time Calculation:   PT Charges     Row Name 12/18/20 1112             Time Calculation    Start Time  1039  -HC      Stop Time  1053  -      Time Calculation (min)  14 min  -      PT Received On  12/18/20  -      PT - Next Appointment  12/21/20  -         Time Calculation- PT    Total Timed Code Minutes- PT  14 minute(s)  -        User Key  (r) = Recorded By, (t) = Taken By, (c) = Cosigned By    Initials Name Provider Type     Amaya Davidson, PT Physical Therapist        Therapy Charges for Today     Code Description Service Date Service Provider Modifiers Qty    [id number]  PT EVAL LOW COMPLEXITY 4 12/17/2020 Amaya Davidson, PT GP 1    [id number]  GAIT TRAINING EA 15 MIN 12/18/2020 Amaya Davidson, PT GP 1          PT G-Codes  Outcome Measure Options: AM-PAC 6 Clicks Basic Mobility (PT)  AM-PAC 6 Clicks Score (PT): 20    Amaya Davidson PT  12/18/2020

## 2020-12-18 NOTE — PLAN OF CARE
Goal Outcome Evaluation:  Plan of Care Reviewed With: patient  Progress: improving  Outcome Summary: Pt without complaints, ambulating well with PT. Pt to dc home with 24/7 assistance. VSS. Continue to monitor until dc.

## 2020-12-18 NOTE — PLAN OF CARE
Goal Outcome Evaluation:  Plan of Care Reviewed With: patient  Progress: no change     Patient states she is having trouble sleeping but is very tired. Nicotine patch has been ordered. V/S stable. Will continue to monitor.

## 2020-12-19 LAB
BACTERIA SPEC AEROBE CULT: ABNORMAL
GRAM STN SPEC: ABNORMAL
ISOLATED FROM: ABNORMAL

## 2020-12-21 NOTE — PROGRESS NOTES
Case Management Discharge Note      Final Note: Home         Selected Continued Care - Discharged on 12/18/2020 Admission date: 12/16/2020 - Discharge disposition: Home-Health Care [facility] [place]              Final Discharge Disposition Code: 01 - home or self-care

## 2021-10-10 ENCOUNTER — APPOINTMENT (OUTPATIENT)
Dept: CT IMAGING | Facility: HOSPITAL | Age: 81
End: 2021-10-10

## 2021-10-10 ENCOUNTER — APPOINTMENT (OUTPATIENT)
Dept: GENERAL RADIOLOGY | Facility: HOSPITAL | Age: 81
End: 2021-10-10

## 2021-10-10 ENCOUNTER — HOSPITAL ENCOUNTER (INPATIENT)
Facility: HOSPITAL | Age: 81
LOS: 1 days | Discharge: HOME | End: 2021-10-12
Attending: EMERGENCY MEDICINE | Admitting: FAMILY MEDICINE

## 2021-10-10 DIAGNOSIS — R53.1 WEAKNESS: ICD-10-CM

## 2021-10-10 DIAGNOSIS — T42.1X1A ACCIDENTAL CARBAMAZEPINE POISONING, INITIAL ENCOUNTER: ICD-10-CM

## 2021-10-10 DIAGNOSIS — S80.02XA CONTUSION OF LEFT KNEE, INITIAL ENCOUNTER: ICD-10-CM

## 2021-10-10 DIAGNOSIS — S00.83XA CONTUSION OF OTHER PART OF HEAD, INITIAL ENCOUNTER: Primary | ICD-10-CM

## 2021-10-10 PROBLEM — S00.93XA HEAD CONTUSION: Status: ACTIVE | Noted: 2021-10-10

## 2021-10-10 LAB
ALBUMIN SERPL-MCNC: 3.7 G/DL (ref 3.5–5.2)
ALBUMIN/GLOB SERPL: 1.3 G/DL
ALP SERPL-CCNC: 75 U/L (ref 39–117)
ALT SERPL W P-5'-P-CCNC: 12 U/L (ref 1–33)
AMPHET+METHAMPHET UR QL: NEGATIVE
ANION GAP SERPL CALCULATED.3IONS-SCNC: 12 MMOL/L (ref 5–15)
APTT PPP: 22.4 SECONDS (ref 24–31)
AST SERPL-CCNC: 17 U/L (ref 1–32)
BACTERIA UR QL AUTO: ABNORMAL /HPF
BARBITURATES UR QL SCN: NEGATIVE
BASOPHILS # BLD AUTO: 0 10*3/MM3 (ref 0–0.2)
BASOPHILS NFR BLD AUTO: 0.7 % (ref 0–1.5)
BENZODIAZ UR QL SCN: NEGATIVE
BILIRUB SERPL-MCNC: 0.2 MG/DL (ref 0–1.2)
BILIRUB UR QL STRIP: NEGATIVE
BUN SERPL-MCNC: 30 MG/DL (ref 8–23)
BUN/CREAT SERPL: 27.5 (ref 7–25)
CALCIUM SPEC-SCNC: 8.3 MG/DL (ref 8.6–10.5)
CANNABINOIDS SERPL QL: NEGATIVE
CARBAMAZEPINE SERPL-MCNC: 14.9 MCG/ML (ref 4–12)
CHLORIDE SERPL-SCNC: 108 MMOL/L (ref 98–107)
CLARITY UR: CLEAR
CO2 SERPL-SCNC: 18 MMOL/L (ref 22–29)
COCAINE UR QL: NEGATIVE
COLOR UR: YELLOW
CREAT SERPL-MCNC: 1.09 MG/DL (ref 0.57–1)
DEPRECATED RDW RBC AUTO: 43.8 FL (ref 37–54)
EOSINOPHIL # BLD AUTO: 0 10*3/MM3 (ref 0–0.4)
EOSINOPHIL NFR BLD AUTO: 0.9 % (ref 0.3–6.2)
ERYTHROCYTE [DISTWIDTH] IN BLOOD BY AUTOMATED COUNT: 13.1 % (ref 12.3–15.4)
ETHANOL UR QL: <0.01 %
GFR SERPL CREATININE-BSD FRML MDRD: 48 ML/MIN/1.73
GLOBULIN UR ELPH-MCNC: 2.8 GM/DL
GLUCOSE SERPL-MCNC: 132 MG/DL (ref 65–99)
GLUCOSE UR STRIP-MCNC: NEGATIVE MG/DL
HCT VFR BLD AUTO: 28.5 % (ref 34–46.6)
HGB BLD-MCNC: 9.7 G/DL (ref 12–15.9)
HGB UR QL STRIP.AUTO: ABNORMAL
HYALINE CASTS UR QL AUTO: ABNORMAL /LPF
INR PPP: <0.93 (ref 0.93–1.1)
KETONES UR QL STRIP: NEGATIVE
LEUKOCYTE ESTERASE UR QL STRIP.AUTO: NEGATIVE
LYMPHOCYTES # BLD AUTO: 1.2 10*3/MM3 (ref 0.7–3.1)
LYMPHOCYTES NFR BLD AUTO: 22.3 % (ref 19.6–45.3)
MAGNESIUM SERPL-MCNC: 2 MG/DL (ref 1.6–2.4)
MCH RBC QN AUTO: 33 PG (ref 26.6–33)
MCHC RBC AUTO-ENTMCNC: 34.1 G/DL (ref 31.5–35.7)
MCV RBC AUTO: 96.7 FL (ref 79–97)
METHADONE UR QL SCN: NEGATIVE
MONOCYTES # BLD AUTO: 0.3 10*3/MM3 (ref 0.1–0.9)
MONOCYTES NFR BLD AUTO: 5.6 % (ref 5–12)
NEUTROPHILS NFR BLD AUTO: 4 10*3/MM3 (ref 1.7–7)
NEUTROPHILS NFR BLD AUTO: 70.5 % (ref 42.7–76)
NITRITE UR QL STRIP: NEGATIVE
NRBC BLD AUTO-RTO: 0 /100 WBC (ref 0–0.2)
OPIATES UR QL: NEGATIVE
OXYCODONE UR QL SCN: NEGATIVE
PH UR STRIP.AUTO: 6 [PH] (ref 5–8)
PLATELET # BLD AUTO: 227 10*3/MM3 (ref 140–450)
PMV BLD AUTO: 7.9 FL (ref 6–12)
POTASSIUM SERPL-SCNC: 4.5 MMOL/L (ref 3.5–5.2)
PROT SERPL-MCNC: 6.5 G/DL (ref 6–8.5)
PROT UR QL STRIP: ABNORMAL
PROTHROMBIN TIME: 10.3 SECONDS (ref 9.6–11.7)
RBC # BLD AUTO: 2.95 10*6/MM3 (ref 3.77–5.28)
RBC # UR: ABNORMAL /HPF
REF LAB TEST METHOD: ABNORMAL
SARS-COV-2 RNA PNL SPEC NAA+PROBE: NOT DETECTED
SODIUM SERPL-SCNC: 138 MMOL/L (ref 136–145)
SP GR UR STRIP: 1.02 (ref 1–1.03)
SQUAMOUS #/AREA URNS HPF: ABNORMAL /HPF
TRI-PHOS CRY URNS QL MICRO: ABNORMAL
TROPONIN T SERPL-MCNC: <0.01 NG/ML (ref 0–0.03)
TSH SERPL DL<=0.05 MIU/L-ACNC: 2.08 UIU/ML (ref 0.27–4.2)
UROBILINOGEN UR QL STRIP: ABNORMAL
WBC # BLD AUTO: 5.6 10*3/MM3 (ref 3.4–10.8)
WBC UR QL AUTO: ABNORMAL /HPF
WHOLE BLOOD HOLD SPECIMEN: NORMAL
WHOLE BLOOD HOLD SPECIMEN: NORMAL

## 2021-10-10 PROCEDURE — 87086 URINE CULTURE/COLONY COUNT: CPT | Performed by: EMERGENCY MEDICINE

## 2021-10-10 PROCEDURE — 72125 CT NECK SPINE W/O DYE: CPT

## 2021-10-10 PROCEDURE — U0003 INFECTIOUS AGENT DETECTION BY NUCLEIC ACID (DNA OR RNA); SEVERE ACUTE RESPIRATORY SYNDROME CORONAVIRUS 2 (SARS-COV-2) (CORONAVIRUS DISEASE [COVID-19]), AMPLIFIED PROBE TECHNIQUE, MAKING USE OF HIGH THROUGHPUT TECHNOLOGIES AS DESCRIBED BY CMS-2020-01-R: HCPCS | Performed by: EMERGENCY MEDICINE

## 2021-10-10 PROCEDURE — 80053 COMPREHEN METABOLIC PANEL: CPT | Performed by: EMERGENCY MEDICINE

## 2021-10-10 PROCEDURE — 99284 EMERGENCY DEPT VISIT MOD MDM: CPT

## 2021-10-10 PROCEDURE — 83735 ASSAY OF MAGNESIUM: CPT | Performed by: EMERGENCY MEDICINE

## 2021-10-10 PROCEDURE — G0378 HOSPITAL OBSERVATION PER HR: HCPCS

## 2021-10-10 PROCEDURE — 80307 DRUG TEST PRSMV CHEM ANLYZR: CPT | Performed by: EMERGENCY MEDICINE

## 2021-10-10 PROCEDURE — 73560 X-RAY EXAM OF KNEE 1 OR 2: CPT

## 2021-10-10 PROCEDURE — 71045 X-RAY EXAM CHEST 1 VIEW: CPT

## 2021-10-10 PROCEDURE — 80156 ASSAY CARBAMAZEPINE TOTAL: CPT | Performed by: EMERGENCY MEDICINE

## 2021-10-10 PROCEDURE — 85730 THROMBOPLASTIN TIME PARTIAL: CPT | Performed by: EMERGENCY MEDICINE

## 2021-10-10 PROCEDURE — 85610 PROTHROMBIN TIME: CPT | Performed by: EMERGENCY MEDICINE

## 2021-10-10 PROCEDURE — U0005 INFEC AGEN DETEC AMPLI PROBE: HCPCS | Performed by: EMERGENCY MEDICINE

## 2021-10-10 PROCEDURE — 93005 ELECTROCARDIOGRAM TRACING: CPT | Performed by: EMERGENCY MEDICINE

## 2021-10-10 PROCEDURE — P9612 CATHETERIZE FOR URINE SPEC: HCPCS

## 2021-10-10 PROCEDURE — 70450 CT HEAD/BRAIN W/O DYE: CPT

## 2021-10-10 PROCEDURE — 99222 1ST HOSP IP/OBS MODERATE 55: CPT | Performed by: HOSPITALIST

## 2021-10-10 PROCEDURE — 84484 ASSAY OF TROPONIN QUANT: CPT | Performed by: EMERGENCY MEDICINE

## 2021-10-10 PROCEDURE — 81001 URINALYSIS AUTO W/SCOPE: CPT | Performed by: EMERGENCY MEDICINE

## 2021-10-10 PROCEDURE — 85025 COMPLETE CBC W/AUTO DIFF WBC: CPT | Performed by: EMERGENCY MEDICINE

## 2021-10-10 PROCEDURE — 82077 ASSAY SPEC XCP UR&BREATH IA: CPT | Performed by: EMERGENCY MEDICINE

## 2021-10-10 PROCEDURE — 84443 ASSAY THYROID STIM HORMONE: CPT | Performed by: EMERGENCY MEDICINE

## 2021-10-10 RX ORDER — BUDESONIDE AND FORMOTEROL FUMARATE DIHYDRATE 80; 4.5 UG/1; UG/1
2 AEROSOL RESPIRATORY (INHALATION)
Status: DISCONTINUED | OUTPATIENT
Start: 2021-10-10 | End: 2021-10-12 | Stop reason: HOSPADM

## 2021-10-10 RX ORDER — ALBUTEROL SULFATE 90 UG/1
2 AEROSOL, METERED RESPIRATORY (INHALATION) DAILY
Status: ON HOLD | COMMUNITY
End: 2022-01-28

## 2021-10-10 RX ORDER — DONEPEZIL HYDROCHLORIDE 5 MG/1
5 TABLET, FILM COATED ORAL NIGHTLY
Status: DISCONTINUED | OUTPATIENT
Start: 2021-10-10 | End: 2021-10-12 | Stop reason: HOSPADM

## 2021-10-10 RX ORDER — AMLODIPINE BESYLATE 5 MG/1
10 TABLET ORAL DAILY
Status: DISCONTINUED | OUTPATIENT
Start: 2021-10-11 | End: 2021-10-12 | Stop reason: HOSPADM

## 2021-10-10 RX ORDER — SODIUM CHLORIDE 0.9 % (FLUSH) 0.9 %
10 SYRINGE (ML) INJECTION EVERY 12 HOURS SCHEDULED
Status: DISCONTINUED | OUTPATIENT
Start: 2021-10-10 | End: 2021-10-12 | Stop reason: HOSPADM

## 2021-10-10 RX ORDER — SODIUM CHLORIDE 0.9 % (FLUSH) 0.9 %
10 SYRINGE (ML) INJECTION AS NEEDED
Status: DISCONTINUED | OUTPATIENT
Start: 2021-10-10 | End: 2021-10-12 | Stop reason: HOSPADM

## 2021-10-10 RX ORDER — SODIUM CHLORIDE 9 MG/ML
50 INJECTION, SOLUTION INTRAVENOUS CONTINUOUS
Status: DISCONTINUED | OUTPATIENT
Start: 2021-10-10 | End: 2021-10-11

## 2021-10-10 RX ORDER — ATENOLOL 50 MG/1
50 TABLET ORAL DAILY
Status: DISCONTINUED | OUTPATIENT
Start: 2021-10-10 | End: 2021-10-12 | Stop reason: HOSPADM

## 2021-10-10 RX ADMIN — DONEPEZIL HYDROCHLORIDE 5 MG: 5 TABLET, FILM COATED ORAL at 23:15

## 2021-10-10 NOTE — H&P
[facility]      Patient Name: Maddison Vasquez  : 1940  MRN: [id number]  Primary Care Physician:  Provider, No Known  Date of admission: 10/10/2021      Subjective      Chief Complaint: Fall.    History of Present Illness: Maddison Vasquez is a 81 y.o. female who presented to [facility] on 10/10/2021 after sustaining a fall at home, patient has a 24-hour care at home.  As per ER physician, she last her balance.  Patient alert and oriented x0, as per family member she has been diagnosed with dementia.  Patient underwent CT head revealing no acute intracranial process, it revealed soft tissue hematoma around left eye.  Urine toxicology was negative.  Following this we were asked and the patient for observation and for safe discharge planning.  Patient UA is negative, chest x-ray no acute infiltrates, WBC normal, there is no evidence for any fever.      ROS     Personal History     Past Medical History:   Diagnosis Date   • Dementia (HCC)    • Hypertension    • Seizures (HCC)        Past Surgical History:   Procedure Laterality Date   • BACK SURGERY     • BRAIN SURGERY         Family History: family history includes No Known Problems in her father and mother. Otherwise pertinent FHx was reviewed and not pertinent to current issue.    Social History:  reports that she has been smoking. She has been smoking about 0.25 packs per day. She has never used smokeless tobacco. She reports that she does not drink alcohol and does not use drugs.    Home Medications:  Prior to Admission Medications     Prescriptions Last Dose Informant Patient Reported? Taking?    amLODIPine (NORVASC) 10 MG tablet   Yes No    Take 10 mg by mouth Daily.    atenolol (TENORMIN) 50 MG tablet   Yes No    Take 50 mg by mouth Daily.    carBAMazepine (TEGretol) 100 MG chewable tablet   Yes No    Chew 150 mg 2 (Two) Times a Day.    cephalexin (Keflex) 500 MG capsule   No No    Take 1 capsule by  mouth 2 (Two) Times a Day.    donepezil (ARICEPT) 5 MG tablet   Yes No    Take 5 mg by mouth Every Night.    Fluticasone Furoate-Vilanterol (Breo Ellipta) 100-25 MCG/INH inhaler   Yes No    Inhale 1 puff Daily.    guaifenesin-dextromethorphan (MUCINEX DM)  MG tablet sustained-release 12 hour tablet   Yes No    Take 1 tablet by mouth 2 (Two) Times a Day As Needed.    HYDROcodone-acetaminophen (NORCO) 7.5-325 MG per tablet   Yes No    Take 1 tablet by mouth Every 8 (Eight) Hours As Needed for Moderate Pain .    sertraline (ZOLOFT) 50 MG tablet   Yes No    Take 50 mg by mouth Daily.            Allergies:  No Known Allergies    Objective      Vitals:   Temp:  [98.2 °F (36.8 °C)] 98.2 °F (36.8 °C)  Heart Rate:  [62-69] 62  Resp:  [16-17] 17  BP: (168-185)/(67-79) 168/67    Physical Exam  Vitals and nursing note reviewed.   Constitutional:       General: She is not in acute distress.     Appearance: Normal appearance. She is well-developed. She is not ill-appearing, toxic-appearing or diaphoretic.   HENT:      Head: Normocephalic and atraumatic.      Right Ear: Ear canal and external ear normal.      Left Ear: Ear canal and external ear normal.      Nose: Nose normal. No congestion or rhinorrhea.      Mouth/Throat:      Mouth: Mucous membranes are moist.      Pharynx: No oropharyngeal exudate.   Eyes:      General: No scleral icterus.        Right eye: No discharge.         Left eye: No discharge.      Extraocular Movements: Extraocular movements intact.      Conjunctiva/sclera: Conjunctivae normal.      Pupils: Pupils are equal, round, and reactive to light.   Neck:      Thyroid: No thyromegaly.      Vascular: No carotid bruit or JVD.      Trachea: No tracheal deviation.   Cardiovascular:      Rate and Rhythm: Normal rate and regular rhythm.      Pulses: Normal pulses.      Heart sounds: Normal heart sounds. No murmur heard.  No friction rub. No gallop.    Pulmonary:      Effort: Pulmonary effort is normal. No  respiratory distress.      Breath sounds: Normal breath sounds. No stridor. No wheezing, rhonchi or rales.   Chest:      Chest wall: No tenderness.   Abdominal:      General: Bowel sounds are normal. There is no distension.      Palpations: Abdomen is soft. There is no mass.      Tenderness: There is no abdominal tenderness. There is no guarding or rebound.      Hernia: No hernia is present.   Musculoskeletal:         General: No swelling, tenderness, deformity or signs of injury. Normal range of motion.      Cervical back: Normal range of motion and neck supple. No rigidity. No muscular tenderness.      Right lower leg: No edema.      Left lower leg: No edema.      Comments: Ecchymosis around left eye, left eye closed.   Lymphadenopathy:      Cervical: No cervical adenopathy.   Skin:     General: Skin is warm and dry.      Coloration: Skin is not jaundiced or pale.      Findings: No bruising, erythema or rash.   Neurological:      General: No focal deficit present.      Mental Status: She is alert. Mental status is at baseline.      Cranial Nerves: No cranial nerve deficit.      Sensory: No sensory deficit.      Motor: No weakness or abnormal muscle tone.      Coordination: Coordination normal.   Psychiatric:         Mood and Affect: Mood normal.         Behavior: Behavior normal.         Thought Content: Thought content normal.         Judgment: Judgment normal.          Result Review    Result Review:  I have personally reviewed the results from the time of this admission to 10/10/2021 16:16 EDT and agree with these findings:  [x]  Laboratory  [x]  Microbiology  [x]  Radiology  []  EKG/Telemetry   []  Cardiology/Vascular   []  Pathology  []  Old records  []  Other:  Most notable findings include:       Assessment/Plan        Active Hospital Problems:  Active Hospital Problems    Diagnosis    • Head contusion    • Anemia    • Hypertension    • Seizures (HCC)    • Dementia (HCC)      Plan:     Fall with hematoma  involving left forehead area, fall mechanical in nature, PT OT evaluate and treat,  for safe discharge planning.    Dementia without agitation, patient noted on Aricept.    Hypertension, continue amlodipine and atenolol, monitor vitals.    History of seizure disorder, patient noted on Tegretol.    DVT prophylaxis with SCDs for now, can give chemical after 24 to 48 hours.    DVT prophylaxis:  No DVT prophylaxis order currently exists.    CODE STATUS:       Admission Status:  I believe this patient meets  status.    I discussed the patient's findings and my recommendations with patient.    This patient has been examined wearing appropriate Personal Protective Equipment and discussed with hospital infection control department. 10/10/21      Signature:

## 2021-10-10 NOTE — ED PROVIDER NOTES
Subjective   History of Present Illness  Fall, possible syncope  81-year-old female reported head multiple falls at home where she lives with daughter.  She reportedly struck her head this morning when she fell possibly a syncopal episode no seizure activity witnessed.  Patient is unable give any further history review of systems secondary to dementia and daughter states she has been increasingly weak.  There is no reported vomiting or diarrhea or fever  Review of Systems   Unable to perform ROS: Dementia       Past Medical History:   Diagnosis Date   • Dementia (HCC)    • Hypertension    • Seizures (HCC)        No Known Allergies    Past Surgical History:   Procedure Laterality Date   • BACK SURGERY     • BRAIN SURGERY         Family History   Problem Relation Age of Onset   • No Known Problems Mother    • No Known Problems Father        Social History     Socioeconomic History   • Marital status: Single   Tobacco Use   • Smoking status: Light Tobacco Smoker     Packs/day: 0.25   • Smokeless tobacco: Never Used   Substance and Sexual Activity   • Alcohol use: Never   • Drug use: Never   • Sexual activity: Defer     Prior to Admission medications    Medication Sig Start Date End Date Taking? Authorizing Provider   amLODIPine (NORVASC) 10 MG tablet Take 10 mg by mouth Daily.    ProviderNikole MD   atenolol (TENORMIN) 50 MG tablet Take 50 mg by mouth Daily.    ProviderNikole MD   carBAMazepine (TEGretol) 100 MG chewable tablet Chew 150 mg 2 (Two) Times a Day.    ProviderNikole MD   cephalexin (Keflex) 500 MG capsule Take 1 capsule by mouth 2 (Two) Times a Day. 12/18/20   Ankit Martin DO   donepezil (ARICEPT) 5 MG tablet Take 5 mg by mouth Every Night.    ProviderNikole MD   Fluticasone Furoate-Vilanterol (Breo Ellipta) 100-25 MCG/INH inhaler Inhale 1 puff Daily.    ProviderNikole MD   guaifenesin-dextromethorphan (MUCINEX DM)  MG tablet sustained-release 12 hour tablet Take 1  "tablet by mouth 2 (Two) Times a Day As Needed.    ProviderNikole MD   HYDROcodone-acetaminophen (NORCO) 7.5-325 MG per tablet Take 1 tablet by mouth Every 8 (Eight) Hours As Needed for Moderate Pain .    ProviderNikole MD   sertraline (ZOLOFT) 50 MG tablet Take 50 mg by mouth Daily.    ProviderNikole MD     BP (!) 185/79   Pulse 69   Temp 98.2 °F (36.8 °C)   Resp 16   Ht 157.5 cm (62\")   Wt 52.2 kg (115 lb)   SpO2 100%   BMI 21.03 kg/m²   I examined the patient using the appropriate personal protective equipment.          Objective   Physical Exam  General: Thin elderly female awake and alert no acute distress  Eyes: Pupils round and reactive, sclera nonicteric, large left eyebrow hematoma  HEENT: Mucous membranes somewhat dry, no mucosal swelling  Neck: Supple, no nuchal rigidity, no soft tissue swelling, C-spine thoracic and lumbar spine nontender  Respirations: Respirations nonlabored, equal breath sounds bilaterally, clear lungs  Heart regular rate and rhythm, no murmurs rubs or gallops,   Abdomen soft nontender nondistended,   Extremities bruising over the anterior aspect of the left knee, no deformity, range of motion intact, hip exam essentially normal nontender, normal pulses and sensorimotor function distally in extremities upper extremities moving without difficulty and no point bony tenderness  Neuro cranial nerves grossly intact, no focal limb deficits, mildly confused  Psych oriented person, cooperative  Skin no rash, brisk cap refill  Procedures           ED Course      Prior to Admission medications         Results for orders placed or performed during the hospital encounter of 10/10/21   COVID-19,CEPHEID/JOSEPHINE/BDMAX,COR/DAMIAN/PAD/ANDREINA IN-HOUSE(OR EMERGENT/ADD-ON),NP SWAB IN TRANSPORT MEDIA 3-4 HR TAT, RT-PCR - Swab, Nasopharynx    Specimen: Nasopharynx; Swab   Result Value Ref Range    COVID19 Not Detected Not Detected - Ref. Range   Urinalysis With Culture If Indicated - " Urine, Catheter In/Out    Specimen: Urine, Catheter In/Out   Result Value Ref Range    Color, UA Yellow Yellow, Straw    Appearance, UA Clear Clear    pH, UA 6.0 5.0 - 8.0    Specific Gravity, UA 1.025 1.005 - 1.030    Glucose, UA Negative Negative    Ketones, UA Negative Negative    Bilirubin, UA Negative Negative    Blood, UA Trace (A) Negative    Protein,  mg/dL (2+) (A) Negative    Leuk Esterase, UA Negative Negative    Nitrite, UA Negative Negative    Urobilinogen, UA 0.2 E.U./dL 0.2 - 1.0 E.U./dL   Carbamazepine Level, Total    Specimen: Blood   Result Value Ref Range    Carbamazepine Level 14.9 (C) 4.0 - 12.0 mcg/mL   Comprehensive Metabolic Panel    Specimen: Blood   Result Value Ref Range    Glucose 132 (H) 65 - 99 mg/dL    BUN 30 (H) 8 - 23 mg/dL    Creatinine 1.09 (H) 0.57 - 1.00 mg/dL    Sodium 138 136 - 145 mmol/L    Potassium 4.5 3.5 - 5.2 mmol/L    Chloride 108 (H) 98 - 107 mmol/L    CO2 18.0 (L) 22.0 - 29.0 mmol/L    Calcium 8.3 (L) 8.6 - 10.5 mg/dL    Total Protein 6.5 6.0 - 8.5 g/dL    Albumin 3.70 3.50 - 5.20 g/dL    ALT (SGPT) 12 1 - 33 U/L    AST (SGOT) 17 1 - 32 U/L    Alkaline Phosphatase 75 39 - 117 U/L    Total Bilirubin 0.2 0.0 - 1.2 mg/dL    eGFR Non African Amer 48 (L) >60 mL/min/1.73    Globulin 2.8 gm/dL    A/G Ratio 1.3 g/dL    BUN/Creatinine Ratio 27.5 (H) 7.0 - 25.0    Anion Gap 12.0 5.0 - 15.0 mmol/L   Protime-INR    Specimen: Blood   Result Value Ref Range    Protime 10.3 9.6 - 11.7 Seconds    INR <0.93 (L) 0.93 - 1.10   aPTT    Specimen: Blood   Result Value Ref Range    PTT 22.4 (L) 24.0 - 31.0 seconds   Troponin    Specimen: Blood   Result Value Ref Range    Troponin T <0.010 0.000 - 0.030 ng/mL   Ethanol    Specimen: Blood   Result Value Ref Range    Ethanol % <0.010 %   Urine Drug Screen - Urine, Clean Catch    Specimen: Urine, Clean Catch   Result Value Ref Range    Amphet/Methamphet, Screen Negative Negative    Barbiturates Screen, Urine Negative Negative     Benzodiazepine Screen, Urine Negative Negative    Cocaine Screen, Urine Negative Negative    Opiate Screen Negative Negative    THC, Screen, Urine Negative Negative    Methadone Screen, Urine Negative Negative    Oxycodone Screen, Urine Negative Negative   Magnesium    Specimen: Blood   Result Value Ref Range    Magnesium 2.0 1.6 - 2.4 mg/dL   TSH    Specimen: Blood   Result Value Ref Range    TSH 2.080 0.270 - 4.200 uIU/mL   CBC Auto Differential    Specimen: Blood   Result Value Ref Range    WBC 5.60 3.40 - 10.80 10*3/mm3    RBC 2.95 (L) 3.77 - 5.28 10*6/mm3    Hemoglobin 9.7 (L) 12.0 - 15.9 g/dL    Hematocrit 28.5 (L) 34.0 - 46.6 %    MCV 96.7 79.0 - 97.0 fL    MCH 33.0 26.6 - 33.0 pg    MCHC 34.1 31.5 - 35.7 g/dL    RDW 13.1 12.3 - 15.4 %    RDW-SD 43.8 37.0 - 54.0 fl    MPV 7.9 6.0 - 12.0 fL    Platelets 227 140 - 450 10*3/mm3    Neutrophil % 70.5 42.7 - 76.0 %    Lymphocyte % 22.3 19.6 - 45.3 %    Monocyte % 5.6 5.0 - 12.0 %    Eosinophil % 0.9 0.3 - 6.2 %    Basophil % 0.7 0.0 - 1.5 %    Neutrophils, Absolute 4.00 1.70 - 7.00 10*3/mm3    Lymphocytes, Absolute 1.20 0.70 - 3.10 10*3/mm3    Monocytes, Absolute 0.30 0.10 - 0.90 10*3/mm3    Eosinophils, Absolute 0.00 0.00 - 0.40 10*3/mm3    Basophils, Absolute 0.00 0.00 - 0.20 10*3/mm3    nRBC 0.0 0.0 - 0.2 /100 WBC   Urinalysis, Microscopic Only - Urine, Catheter In/Out    Specimen: Urine, Catheter In/Out   Result Value Ref Range    RBC, UA 0-2 (A) None Seen /HPF    WBC, UA None Seen None Seen /HPF    Bacteria, UA None Seen None Seen /HPF    Squamous Epithelial Cells, UA None Seen None Seen, 0-2 /HPF    Hyaline Casts, UA None Seen None Seen /LPF    Triple Phosphate Crystals, UA      Methodology Manual Light Microscopy    ECG 12 Lead   Result Value Ref Range    QT Interval 406 ms   Lavender Top   Result Value Ref Range    Extra Tube hold for add-on    Light Blue Top   Result Value Ref Range    Extra Tube hold for add-on      XR Knee 1 or 2 View Left    Result  Date: 10/10/2021  No acute fracture or traumatic malalignment identified.  Electronically Signed By-Dmitry Spangler MD On:10/10/2021 2:22 PM This report was finalized on [id number] by  Dmitry Spangler MD.    CT Head Without Contrast    Result Date: 10/10/2021  1.Soft tissue swelling with large subgaleal hematoma along left frontal scalp. 2.No acute intracranial process or calvarial fracture identified. 3.Multiple foci of encephalomalacia with findings suggesting chronic small vessel ischemic disease, similar to prior exam.  Electronically Signed By-Dmitry Spangler MD On:10/10/2021 2:37 PM This report was finalized on [id number] by  Dmitry Spangler MD.    CT Cervical Spine Without Contrast    Result Date: 10/10/2021  No acute fracture or traumatic malalignment identified.  Electronically Signed By-Dmitry Spangler MD On:10/10/2021 2:43 PM This report was finalized on [id number] by  Dmitry Spangler MD.    XR Chest 1 View    Result Date: 10/10/2021  Cardiomegaly with pulmonary vascular congestion.  Electronically Signed By-Dmitry Spangler MD On:10/10/2021 2:23 PM This report was finalized on [id number] by  Dmitry Spangler MD.                                    MDM  Patient presents after multiple falls at home for general weakness.  Not clear whether she had a syncopal episode.  There is no reported witnessed seizure.  She does have a history of seizure disorder and does have some mild Tegretol toxicity based on elevated level.  She was stable on the cardiac monitor.  She is mildly hypertensive.  She does not have focal deficits to suggest infarct.  She she does have some baseline dementia and further assessment was difficult, there is no family available at the bedside.  Findings discussed with the hospitalist service and patient will be admitted for further evaluation and management.  Final diagnoses:   Contusion of other part of head, initial encounter   Weakness   Accidental carbamazepine  poisoning, initial encounter   Contusion of left knee, initial encounter       ED Disposition  ED Disposition     ED Disposition Condition Comment    Decision to Admit  Level of Care: Telemetry [5]   Admitting Physician: JORGE DENTON [[id number]]            No follow-up provider specified.       Medication List      No changes were made to your prescriptions during this visit.          Bret Barton MD  10/10/21 [id number]

## 2021-10-10 NOTE — ED NOTES
EMS reports patient has not bathed in one week, is prone to UTIS and has been complaining of dysuria.  Patient urine malodorous.  Groin and perineum noted to have stool.  Patient was cleaned was cleansing wipes and placed on clean sheets      Aixa Bruce RN  10/10/21 [id number]

## 2021-10-11 LAB
ANION GAP SERPL CALCULATED.3IONS-SCNC: 12 MMOL/L (ref 5–15)
BACTERIA SPEC AEROBE CULT: NO GROWTH
BASOPHILS # BLD AUTO: 0 10*3/MM3 (ref 0–0.2)
BASOPHILS NFR BLD AUTO: 0.7 % (ref 0–1.5)
BUN SERPL-MCNC: 25 MG/DL (ref 8–23)
BUN/CREAT SERPL: 24.5 (ref 7–25)
CALCIUM SPEC-SCNC: 8.4 MG/DL (ref 8.6–10.5)
CARBAMAZEPINE SERPL-MCNC: 8.2 MCG/ML (ref 4–12)
CHLORIDE SERPL-SCNC: 109 MMOL/L (ref 98–107)
CO2 SERPL-SCNC: 19 MMOL/L (ref 22–29)
CREAT SERPL-MCNC: 1.02 MG/DL (ref 0.57–1)
DEPRECATED RDW RBC AUTO: 43.3 FL (ref 37–54)
EOSINOPHIL # BLD AUTO: 0.1 10*3/MM3 (ref 0–0.4)
EOSINOPHIL NFR BLD AUTO: 1.4 % (ref 0.3–6.2)
ERYTHROCYTE [DISTWIDTH] IN BLOOD BY AUTOMATED COUNT: 12.9 % (ref 12.3–15.4)
GFR SERPL CREATININE-BSD FRML MDRD: 52 ML/MIN/1.73
GLUCOSE SERPL-MCNC: 98 MG/DL (ref 65–99)
HCT VFR BLD AUTO: 27.4 % (ref 34–46.6)
HGB BLD-MCNC: 9.5 G/DL (ref 12–15.9)
LYMPHOCYTES # BLD AUTO: 1.7 10*3/MM3 (ref 0.7–3.1)
LYMPHOCYTES NFR BLD AUTO: 24.3 % (ref 19.6–45.3)
MCH RBC QN AUTO: 33.3 PG (ref 26.6–33)
MCHC RBC AUTO-ENTMCNC: 34.7 G/DL (ref 31.5–35.7)
MCV RBC AUTO: 96.1 FL (ref 79–97)
MONOCYTES # BLD AUTO: 0.6 10*3/MM3 (ref 0.1–0.9)
MONOCYTES NFR BLD AUTO: 8.2 % (ref 5–12)
NEUTROPHILS NFR BLD AUTO: 4.5 10*3/MM3 (ref 1.7–7)
NEUTROPHILS NFR BLD AUTO: 65.4 % (ref 42.7–76)
NRBC BLD AUTO-RTO: 0 /100 WBC (ref 0–0.2)
PLATELET # BLD AUTO: 229 10*3/MM3 (ref 140–450)
PMV BLD AUTO: 7.8 FL (ref 6–12)
POTASSIUM SERPL-SCNC: 4.3 MMOL/L (ref 3.5–5.2)
RBC # BLD AUTO: 2.85 10*6/MM3 (ref 3.77–5.28)
SODIUM SERPL-SCNC: 140 MMOL/L (ref 136–145)
WBC # BLD AUTO: 6.9 10*3/MM3 (ref 3.4–10.8)

## 2021-10-11 PROCEDURE — 97162 PT EVAL MOD COMPLEX 30 MIN: CPT

## 2021-10-11 PROCEDURE — 94799 UNLISTED PULMONARY SVC/PX: CPT

## 2021-10-11 PROCEDURE — 25010000002 HYDRALAZINE PER 20 MG: Performed by: INTERNAL MEDICINE

## 2021-10-11 PROCEDURE — 99222 1ST HOSP IP/OBS MODERATE 55: CPT | Performed by: NURSE PRACTITIONER

## 2021-10-11 PROCEDURE — 80156 ASSAY CARBAMAZEPINE TOTAL: CPT | Performed by: EMERGENCY MEDICINE

## 2021-10-11 PROCEDURE — 36415 COLL VENOUS BLD VENIPUNCTURE: CPT | Performed by: EMERGENCY MEDICINE

## 2021-10-11 PROCEDURE — 99221 1ST HOSP IP/OBS SF/LOW 40: CPT | Performed by: PSYCHIATRY & NEUROLOGY

## 2021-10-11 PROCEDURE — 85025 COMPLETE CBC W/AUTO DIFF WBC: CPT | Performed by: HOSPITALIST

## 2021-10-11 PROCEDURE — 80048 BASIC METABOLIC PNL TOTAL CA: CPT | Performed by: EMERGENCY MEDICINE

## 2021-10-11 PROCEDURE — 99497 ADVNCD CARE PLAN 30 MIN: CPT | Performed by: NURSE PRACTITIONER

## 2021-10-11 PROCEDURE — 99233 SBSQ HOSP IP/OBS HIGH 50: CPT | Performed by: FAMILY MEDICINE

## 2021-10-11 RX ORDER — CARBAMAZEPINE 100 MG/5ML
150 SUSPENSION ORAL 2 TIMES DAILY
Status: DISCONTINUED | OUTPATIENT
Start: 2021-10-11 | End: 2021-10-12 | Stop reason: HOSPADM

## 2021-10-11 RX ORDER — ERGOCALCIFEROL 1.25 MG/1
50000 CAPSULE ORAL WEEKLY
COMMUNITY

## 2021-10-11 RX ORDER — CETIRIZINE HYDROCHLORIDE 10 MG/1
10 TABLET ORAL DAILY
Status: DISCONTINUED | OUTPATIENT
Start: 2021-10-11 | End: 2021-10-12 | Stop reason: HOSPADM

## 2021-10-11 RX ORDER — CHOLECALCIFEROL (VITAMIN D3) 125 MCG
5 CAPSULE ORAL NIGHTLY PRN
Status: DISCONTINUED | OUTPATIENT
Start: 2021-10-11 | End: 2021-10-12 | Stop reason: HOSPADM

## 2021-10-11 RX ORDER — CETIRIZINE HYDROCHLORIDE 10 MG/1
10 TABLET ORAL DAILY
COMMUNITY

## 2021-10-11 RX ORDER — HYDRALAZINE HYDROCHLORIDE 20 MG/ML
10 INJECTION INTRAMUSCULAR; INTRAVENOUS EVERY 6 HOURS PRN
Status: DISCONTINUED | OUTPATIENT
Start: 2021-10-11 | End: 2021-10-12 | Stop reason: HOSPADM

## 2021-10-11 RX ADMIN — AMLODIPINE BESYLATE 10 MG: 5 TABLET ORAL at 08:10

## 2021-10-11 RX ADMIN — HYDRALAZINE HYDROCHLORIDE 10 MG: 20 INJECTION INTRAMUSCULAR; INTRAVENOUS at 04:40

## 2021-10-11 RX ADMIN — DONEPEZIL HYDROCHLORIDE 5 MG: 5 TABLET, FILM COATED ORAL at 20:26

## 2021-10-11 RX ADMIN — Medication 5 MG: at 19:42

## 2021-10-11 RX ADMIN — ATENOLOL 50 MG: 50 TABLET ORAL at 08:11

## 2021-10-11 RX ADMIN — BUDESONIDE AND FORMOTEROL FUMARATE DIHYDRATE 2 PUFF: 80; 4.5 AEROSOL RESPIRATORY (INHALATION) at 21:19

## 2021-10-11 RX ADMIN — CARBAMAZEPINE 150 MG: 100 SUSPENSION ORAL at 20:26

## 2021-10-11 RX ADMIN — CARBAMAZEPINE 150 MG: 100 SUSPENSION ORAL at 15:39

## 2021-10-11 RX ADMIN — SERTRALINE HYDROCHLORIDE 50 MG: 50 TABLET ORAL at 08:11

## 2021-10-11 RX ADMIN — CETIRIZINE HYDROCHLORIDE 10 MG: 10 TABLET, FILM COATED ORAL at 14:13

## 2021-10-11 RX ADMIN — BUDESONIDE AND FORMOTEROL FUMARATE DIHYDRATE 2 PUFF: 80; 4.5 AEROSOL RESPIRATORY (INHALATION) at 08:20

## 2021-10-11 NOTE — CONSULTS
Primary Care Provider: Provider, No Known     Consult requested by: Dr. Ames    Reason for Consultation: Neurological evaluation/falls    History taken from: chart RN    Chief complaint: Falls       SUBJECTIVE:    History of present illness:   The patient is an 81-year-old apparently right-handed white female who was evaluated from [facility] at [facility]    Source of information is whatever medical records we have  There is no family at bedside and I tried to call her granddaughter but I could not get through    Apparently she has been falling and it looks like the patient does not follow any recommendations.  She has dementia and when I went to the room, she was walking in the room    She is on Tegretol and it is very strange because she is on a very low dose like 300 mg twice daily and her levels are toxic like 14+    First of all why she is on Tegretol   second what kind of seizure does she have  Third what is her dose  Fourth what is the seizure frequency  Fifth what other medication has she used    We do not have any of that information and we may even consider, if she has not failed conventional therapy or not has any side effects to other medication, of course she is not allergic or at least not documented, we may go to medication that I easily tolerated like levetiracetam?    Patient pleasantly confused          Maddison Vasquez is a 81 y.o. female who presented to [facility] on 10/10/2021 after sustaining a fall at home, patient has a 24-hour care at home.  As per ER physician, she last her balance.  Patient alert and oriented x0, as per family member she has been diagnosed with dementia.  Patient underwent CT head revealing no acute intracranial process, it revealed soft tissue hematoma around left eye.  Urine toxicology was negative.  Following this we were asked and the patient for observation and for safe discharge planning.  Patient UA is negative, chest x-ray no acute  infiltrates, WBC normal, there is no evidence for any fever.       Review of Systems   Not really possible because she does not report any problems      PATIENT HISTORY:  Past Medical History:   Diagnosis Date   • Dementia (HCC)    • Hypertension    • Seizures (HCC)    ,   Past Surgical History:   Procedure Laterality Date   • BACK SURGERY     • BRAIN SURGERY     ,   Family History   Problem Relation Age of Onset   • No Known Problems Mother    • No Known Problems Father    ,   Social History     Tobacco Use   • Smoking status: Light Tobacco Smoker     Packs/day: 0.25   • Smokeless tobacco: Never Used   Substance Use Topics   • Alcohol use: Never   • Drug use: Never   ,   Medications Prior to Admission   Medication Sig Dispense Refill Last Dose   • albuterol sulfate  (90 Base) MCG/ACT inhaler Inhale 2 puffs Daily.      • amLODIPine (NORVASC) 10 MG tablet Take 10 mg by mouth Every Evening.   10/10/2021 at 0000   • atenolol (TENORMIN) 50 MG tablet Take 50 mg by mouth Every Morning.   10/9/2021 at Unknown time   • carBAMazepine (TEGretol) 100 MG chewable tablet Chew 150 mg 2 (Two) Times a Day.      • cetirizine (zyrTEC) 10 MG tablet Take 10 mg by mouth Daily.      • Diclofenac Sodium (VOLTAREN) 1 % gel gel Apply 4 g topically to the appropriate area as directed 4 (Four) Times a Day As Needed. Knee pain as needed      • donepezil (ARICEPT) 5 MG tablet Take 5 mg by mouth Every Night.      • Fluticasone Furoate-Vilanterol (Breo Ellipta) 100-25 MCG/INH inhaler Inhale 1 puff Daily.      • HYDROcodone-acetaminophen (NORCO) 5-325 MG per tablet Take 1 tablet by mouth Every 12 (Twelve) Hours As Needed for Moderate Pain .      • sertraline (ZOLOFT) 50 MG tablet Take 50 mg by mouth Daily.   10/9/2021 at Unknown time   • vitamin D (ERGOCALCIFEROL) 1.25 MG (04626 UT) capsule capsule Take 50,000 Units by mouth 1 (One) Time Per Week.      , Scheduled Meds:  amLODIPine, 10 mg, Oral, Daily  atenolol, 50 mg, Oral,  Daily  budesonide-formoterol, 2 puff, Inhalation, BID - RT  carBAMazepine, 150 mg, Oral, BID  cetirizine, 10 mg, Oral, Daily  donepezil, 5 mg, Oral, Nightly  sertraline, 50 mg, Oral, Daily  sodium chloride, 10 mL, Intravenous, Q12H    , Continuous Infusions:   , PRN Meds:  hydrALAZINE  •  sodium chloride, Allergies:  Patient has no known allergies.    ________________________________________________________        OBJECTIVE:  Upon today's exam, patient in no acute distress but significantly abnormal facial symmetry because of her trauma to the left side of the face      Neurologic Exam    The patient is awake and alert and oriented x self only  She is not oriented to time or place    She can name simple objects and repeat  She is post trauma, significant left side of the face including the forehead and periorbital region with significant ecchymosis  Cranial nerve examination demonstrate:  Full fields of vision to confrontation  Pupils are round, reactive to light and accommodation and size of about 3 mm  No ptosis or nystagmus  Funduscopic examination was not successful  Eye movements are conjugate     Sensation on the face and scalp are normal  Muscles of mastication are normal and symmetric  Muscles of  facial expression are normal and symmetric  Hearing is intact bilaterally  Head turning and shoulder shrugs were unremarkable  Tongue was midline  I could not visualize her oropharynx or uvula     Motor examination:  Normal bulk, tone and strength was 4+ /5  No fasciculations     Sensory examination:  Intact for soft touch, pain and position sensation  Romberg was not evaluated     Reflexes:  0/4     Coordination:  Normal finger-to-nose to finger, rapid alternating movements      Gait:  Deferred, though I saw her going to her bed and she has stooped posture but normal base, stride and turns     Toe signs:  Mute    ________________________________________________________   RESULTS REVIEW:    VITAL SIGNS:   Temp:   [97.8 °F (36.6 °C)-98.9 °F (37.2 °C)] 97.8 °F (36.6 °C)  Heart Rate:  [55-67] 64  Resp:  [14-17] 16  BP: (142-197)/(66-78) 142/78     LABS:  WBC   Date Value Ref Range Status   10/11/2021 6.90 3.40 - 10.80 10*3/mm3 Final     RBC   Date Value Ref Range Status   10/11/2021 2.85 (L) 3.77 - 5.28 10*6/mm3 Final     Hemoglobin   Date Value Ref Range Status   10/11/2021 9.5 (L) 12.0 - 15.9 g/dL Final     Hematocrit   Date Value Ref Range Status   10/11/2021 27.4 (L) 34.0 - 46.6 % Final     MCV   Date Value Ref Range Status   10/11/2021 96.1 79.0 - 97.0 fL Final     MCH   Date Value Ref Range Status   10/11/2021 33.3 (H) 26.6 - 33.0 pg Final     MCHC   Date Value Ref Range Status   10/11/2021 34.7 31.5 - 35.7 g/dL Final     RDW   Date Value Ref Range Status   10/11/2021 12.9 12.3 - 15.4 % Final     RDW-SD   Date Value Ref Range Status   10/11/2021 43.3 37.0 - 54.0 fl Final     MPV   Date Value Ref Range Status   10/11/2021 7.8 6.0 - 12.0 fL Final     Platelets   Date Value Ref Range Status   10/11/2021 229 140 - 450 10*3/mm3 Final     Neutrophil %   Date Value Ref Range Status   10/11/2021 65.4 42.7 - 76.0 % Final     Lymphocyte %   Date Value Ref Range Status   10/11/2021 24.3 19.6 - 45.3 % Final     Monocyte %   Date Value Ref Range Status   10/11/2021 8.2 5.0 - 12.0 % Final     Eosinophil %   Date Value Ref Range Status   10/11/2021 1.4 0.3 - 6.2 % Final     Basophil %   Date Value Ref Range Status   10/11/2021 0.7 0.0 - 1.5 % Final     Neutrophils, Absolute   Date Value Ref Range Status   10/11/2021 4.50 1.70 - 7.00 10*3/mm3 Final     Lymphocytes, Absolute   Date Value Ref Range Status   10/11/2021 1.70 0.70 - 3.10 10*3/mm3 Final     Monocytes, Absolute   Date Value Ref Range Status   10/11/2021 0.60 0.10 - 0.90 10*3/mm3 Final     Eosinophils, Absolute   Date Value Ref Range Status   10/11/2021 0.10 0.00 - 0.40 10*3/mm3 Final     Basophils, Absolute   Date Value Ref Range Status   10/11/2021 0.00 0.00 - 0.20 10*3/mm3  Final     nRBC   Date Value Ref Range Status   10/11/2021 0.0 0.0 - 0.2 /100 WBC Final     Glucose   Date Value Ref Range Status   10/11/2021 98 65 - 99 mg/dL Final     BUN   Date Value Ref Range Status   10/11/2021 25 (H) 8 - 23 mg/dL Final     Creatinine   Date Value Ref Range Status   10/11/2021 1.02 (H) 0.57 - 1.00 mg/dL Final     Sodium   Date Value Ref Range Status   10/11/2021 140 136 - 145 mmol/L Final     Potassium   Date Value Ref Range Status   10/11/2021 4.3 3.5 - 5.2 mmol/L Final     Chloride   Date Value Ref Range Status   10/11/2021 109 (H) 98 - 107 mmol/L Final     CO2   Date Value Ref Range Status   10/11/2021 19.0 (L) 22.0 - 29.0 mmol/L Final     Calcium   Date Value Ref Range Status   10/11/2021 8.4 (L) 8.6 - 10.5 mg/dL Final     Total Protein   Date Value Ref Range Status   10/10/2021 6.5 6.0 - 8.5 g/dL Final     Albumin   Date Value Ref Range Status   10/10/2021 3.70 3.50 - 5.20 g/dL Final     ALT (SGPT)   Date Value Ref Range Status   10/10/2021 12 1 - 33 U/L Final     AST (SGOT)   Date Value Ref Range Status   10/10/2021 17 1 - 32 U/L Final     Alkaline Phosphatase   Date Value Ref Range Status   10/10/2021 75 39 - 117 U/L Final     Total Bilirubin   Date Value Ref Range Status   10/10/2021 0.2 0.0 - 1.2 mg/dL Final     eGFR Non  Amer   Date Value Ref Range Status   10/11/2021 52 (L) >60 mL/min/1.73 Final     BUN/Creatinine Ratio   Date Value Ref Range Status   10/11/2021 24.5 7.0 - 25.0 Final     Anion Gap   Date Value Ref Range Status   10/11/2021 12.0 5.0 - 15.0 mmol/L Final       Lab Results   Component Value Date    TSH 2.080 10/10/2021    [id number] 153 (L) 12/17/2020         IMAGING STUDIES:  XR Knee 1 or 2 View Left    Result Date: 10/10/2021  No acute fracture or traumatic malalignment identified.  Electronically Signed By-Dmitry Spangler MD On:10/10/2021 2:22 PM This report was finalized on [id number] by  Dmitry Spangler MD.    CT Head Without Contrast    Result Date:  10/10/2021  1.Soft tissue swelling with large subgaleal hematoma along left frontal scalp. 2.No acute intracranial process or calvarial fracture identified. 3.Multiple foci of encephalomalacia with findings suggesting chronic small vessel ischemic disease, similar to prior exam.  Electronically Signed By-Dmitry Spangler MD On:10/10/2021 2:37 PM This report was finalized on [id number] by  Dmitry Spangler MD.    CT Cervical Spine Without Contrast    Result Date: 10/10/2021  No acute fracture or traumatic malalignment identified.  Electronically Signed By-Dmitry Spangler MD On:10/10/2021 2:43 PM This report was finalized on [id number] by  Dmitry Spangler MD.    XR Chest 1 View    Result Date: 10/10/2021  Cardiomegaly with pulmonary vascular congestion.  Electronically Signed By-Dmitry Spangler MD On:10/10/2021 2:23 PM This report was finalized on [id number] by  Dmitry Spangler MD.      I reviewed the patient's new clinical results.      ________________________________________________________     PROBLEM LIST:    Hypertension    Seizures (HCC)    Dementia (HCC)    Anemia    Head contusion          Assessment/Plan   ASSESSMENT/PLAN:  Status post multiple falls  So far I am uncertain if these were seizures  Likely deconditioning and mechanical    I will try to get some more information and please refer to my HPI discussion    We may have to change medication      I discussed the patient's findings and my recommendations with nursing staff    Arnie Torres MD  10/11/21  15:12 EDT

## 2021-10-11 NOTE — CONSULTS
Palliative Care Consultation    Patient Name: Maddison Vasquez  : 1940  MRN: [id number]  Allergies: Patient has no known allergies.    Requesting clinician:  Kwaku  Reason for consult: Consultation for clarification of goals of care and code status.      Patient Code Status:   Code Status and Medical Interventions:   Ordered at: 10/11/21 1236     Code Status:    CPR     Medical Interventions (Level of Support Prior to Arrest):    Full           Chief Complaint:    fall    History of Present Illness    Maddison Vasquez is a 81 y.o. female who presented to [facility] ED on 10/10 with reports of a fall at home. Per daughter, patient has a 24-hour care at home.    CT head revealing no acute intracranial process, it revealed soft tissue hematoma around left eye.    PT/OT consulted to work with patient. Concern for discharge       VITAL SIGNS:   Temp:  [97.8 °F (36.6 °C)-98.9 °F (37.2 °C)] 97.8 °F (36.6 °C)  Heart Rate:  [55-67] 64  Resp:  [14-17] 16  BP: (142-197)/(66-78) 142/78       PMH: dementia, seizures    Past Surgical History:   Procedure Laterality Date   • BACK SURGERY     • BRAIN SURGERY         Family History   Problem Relation Age of Onset   • No Known Problems Mother    • No Known Problems Father        Social History     Tobacco Use   • Smoking status: Light Tobacco Smoker     Packs/day: 0.25   • Smokeless tobacco: Never Used   Substance Use Topics   • Alcohol use: Never   • Drug use: Never           LABS:    Results from last 7 days   Lab Units 10/11/21  0355   WBC 10*3/mm3 6.90   HEMOGLOBIN g/dL 9.5*   HEMATOCRIT % 27.4*   PLATELETS 10*3/mm3 229     Results from last 7 days   Lab Units 10/11/21  0355   SODIUM mmol/L 140   POTASSIUM mmol/L 4.3   CHLORIDE mmol/L 109*   CO2 mmol/L 19.0*   BUN mg/dL 25*   CREATININE mg/dL 1.02*   GLUCOSE mg/dL 98   CALCIUM mg/dL 8.4*     Results from last 7 days   Lab Units 10/11/21  0355 10/10/21  1312 10/10/21  1312   SODIUM mmol/L 140   < > 138   POTASSIUM mmol/L 4.3    < > 4.5   CHLORIDE mmol/L 109*   < > 108*   CO2 mmol/L 19.0*   < > 18.0*   BUN mg/dL 25*   < > 30*   CREATININE mg/dL 1.02*   < > 1.09*   CALCIUM mg/dL 8.4*   < > 8.3*   BILIRUBIN mg/dL  --   --  0.2   ALK PHOS U/L  --   --  75   ALT (SGPT) U/L  --   --  12   AST (SGOT) U/L  --   --  17   GLUCOSE mg/dL 98   < > 132*    < > = values in this interval not displayed.         IMAGING STUDIES:  XR Knee 1 or 2 View Left    Result Date: 10/10/2021  No acute fracture or traumatic malalignment identified.  Electronically Signed By-Dmitry Spangler MD On:10/10/2021 2:22 PM This report was finalized on [id number] by  Dmitry Spangler MD.    CT Head Without Contrast    Result Date: 10/10/2021  1.Soft tissue swelling with large subgaleal hematoma along left frontal scalp. 2.No acute intracranial process or calvarial fracture identified. 3.Multiple foci of encephalomalacia with findings suggesting chronic small vessel ischemic disease, similar to prior exam.  Electronically Signed By-Dmitry Spangler MD On:10/10/2021 2:37 PM This report was finalized on [id number] by  Dmitry Spangler MD.    CT Cervical Spine Without Contrast    Result Date: 10/10/2021  No acute fracture or traumatic malalignment identified.  Electronically Signed By-Dmitry Spangler MD On:10/10/2021 2:43 PM This report was finalized on [id number] by  Dmitry Spangler MD.    XR Chest 1 View    Result Date: 10/10/2021  Cardiomegaly with pulmonary vascular congestion.  Electronically Signed BySigifredo Spangler MD On:10/10/2021 2:23 PM This report was finalized on [id number] by  Dmitry Spangler MD.        I reviewed the patient's new clinical results including labs, imaging, and vitals.        Scheduled Meds:  amLODIPine, 10 mg, Oral, Daily  atenolol, 50 mg, Oral, Daily  budesonide-formoterol, 2 puff, Inhalation, BID - RT  carBAMazepine, 150 mg, Oral, BID  cetirizine, 10 mg, Oral, Daily  donepezil, 5 mg, Oral, Nightly  sertraline, 50 mg, Oral,  Daily  sodium chloride, 10 mL, Intravenous, Q12H      Continuous Infusions:       I have reviewed patient's current medication list.     Review of Systems   Unable to perform ROS: Acuity of condition         Physical Exam  Vitals and nursing note reviewed.   Constitutional:       Appearance: She is ill-appearing.   HENT:      Head:      Comments: Large hematoma      Nose: Nose normal.      Mouth/Throat:      Mouth: Mucous membranes are dry.      Pharynx: Oropharynx is clear.   Eyes:      Extraocular Movements: Extraocular movements intact.      Conjunctiva/sclera: Conjunctivae normal.      Pupils: Pupils are equal, round, and reactive to light.   Cardiovascular:      Rate and Rhythm: Normal rate.      Pulses: Normal pulses.   Pulmonary:      Effort: Pulmonary effort is normal.   Abdominal:      General: Abdomen is flat.   Musculoskeletal:         General: Normal range of motion.      Cervical back: Normal range of motion.   Skin:     General: Skin is dry.   Neurological:      General: No focal deficit present.      Mental Status: Mental status is at baseline. She is disoriented.             PROBLEM LIST:    Hypertension    Seizures (HCC)    Dementia (HCC)    Anemia    Head contusion          ASSESSMENT/PLAN:    Facial hematoma: CT head findings of subgaleal hematoma, but no change in mental status. Neurosurgery consulted.     Fall: likely multifactorial related to dementia and age. PT/OT to work with patient.     Seizure disorder/Dementia: Chronic conditions per primary.       ADVANCED CARE PLANNING:     10/11 Met with patient this afternoon. Patient is mildly confused, having difficulty answering questions. I called and spoke with granddaughter Angelica this afternoon regarding the patients current medical condition. She states that the patient lives at home with her and that they have a hired caregiver as well that is with the patient all the time. Ila states that her dad was the patients son. There is another  son of the patient, but he is not involved in the patients care. She says that the patient is typically alert and oriented and able to have clear conversations with occasional confusion. Ila is in the process of obtaining POA per her report. We talked about her code status and advanced directives. Ila states that she has never discussed those things with the patient. She plans to do so in the am, but asks that we keep the patient a full code with full medical intervention at this time.       Advanced Directives: Patient has advance directive, copy requested  Health Care Directive on file: No  Health Care Surrogate:      Palliative Performance Scale Score:    Comments:           Decisional Capacity: no  Patient's understanding of illness: unknown  Patient goals of care:  Full code, full intervention      Thank you for this consult and allowing us to participate in patient's plan of care. Palliative Care Team will continue to follow patient.       I spent 58 minutes reviewing medical and medication records, assessing and examining patient, discussing with family, answering questions, providing some guidance about a plan and documentation of care, and coordinating care with other healthcare members. More than 50% of time spent face to face discussing disease education, current clinical status, and medication management.     I spent an additional 21 minutes on advanced care planning, goals of care, and code status discussion.     Azra Gilbert, APRN  10/11/2021

## 2021-10-11 NOTE — PLAN OF CARE
Problem: Skin Injury Risk Increased  Goal: Skin Health and Integrity  Intervention: Optimize Skin Protection  Recent Flowsheet Documentation  Taken 10/11/2021 0316 by Karlee Pate RN  Head of Bed (HOB): HOB elevated  Taken 10/11/2021 0110 by Karlee Pate RN  Head of Bed (HOB): HOB elevated  Taken 10/10/2021 2316 by Karlee Pate RN  Head of Bed (HOB): HOB elevated  Taken 10/10/2021 2120 by Karlee Pate RN  Head of Bed (HOB): HOB elevated  Taken 10/10/2021 1930 by Karlee Pate RN  Head of Bed (HOB): HOB elevated     Problem: Fall Injury Risk  Goal: Absence of Fall and Fall-Related Injury  Intervention: Identify and Manage Contributors to Fall Injury Risk  Recent Flowsheet Documentation  Taken 10/11/2021 0316 by Karlee Pate RN  Medication Review/Management: medications reviewed  Taken 10/10/2021 1930 by Karlee Pate RN  Medication Review/Management: medications reviewed  Intervention: Promote Injury-Free Environment  Recent Flowsheet Documentation  Taken 10/11/2021 0316 by Karlee Pate RN  Safety Promotion/Fall Prevention:   assistive device/personal items within reach   clutter free environment maintained   fall prevention program maintained   lighting adjusted   nonskid shoes/slippers when out of bed   room organization consistent   safety round/check completed  Taken 10/11/2021 0110 by Karlee Pate RN  Safety Promotion/Fall Prevention:   assistive device/personal items within reach   clutter free environment maintained   fall prevention program maintained   lighting adjusted   nonskid shoes/slippers when out of bed   safety round/check completed   room organization consistent  Taken 10/10/2021 2316 by Karlee Pate RN  Safety Promotion/Fall Prevention:   assistive device/personal items within reach   clutter free environment maintained   fall prevention program maintained   lighting adjusted   nonskid shoes/slippers when out of bed   room organization  consistent   safety round/check completed  Taken 10/10/2021 2120 by Karlee Pate RN  Safety Promotion/Fall Prevention:   assistive device/personal items within reach   clutter free environment maintained   fall prevention program maintained   lighting adjusted   nonskid shoes/slippers when out of bed   room organization consistent   safety round/check completed  Taken 10/10/2021 1930 by Karlee Pate RN  Safety Promotion/Fall Prevention:   assistive device/personal items within reach   clutter free environment maintained   fall prevention program maintained   lighting adjusted   nonskid shoes/slippers when out of bed   room organization consistent   safety round/check completed     Problem: Adult Inpatient Plan of Care  Goal: Absence of Hospital-Acquired Illness or Injury  Intervention: Identify and Manage Fall Risk  Recent Flowsheet Documentation  Taken 10/11/2021 0316 by Karlee Pate RN  Safety Promotion/Fall Prevention:   assistive device/personal items within reach   clutter free environment maintained   fall prevention program maintained   lighting adjusted   nonskid shoes/slippers when out of bed   room organization consistent   safety round/check completed  Taken 10/11/2021 0110 by Karlee Pate RN  Safety Promotion/Fall Prevention:   assistive device/personal items within reach   clutter free environment maintained   fall prevention program maintained   lighting adjusted   nonskid shoes/slippers when out of bed   safety round/check completed   room organization consistent  Taken 10/10/2021 2316 by Karlee Pate RN  Safety Promotion/Fall Prevention:   assistive device/personal items within reach   clutter free environment maintained   fall prevention program maintained   lighting adjusted   nonskid shoes/slippers when out of bed   room organization consistent   safety round/check completed  Taken 10/10/2021 2120 by Karlee Pate RN  Safety Promotion/Fall Prevention:   assistive  device/personal items within reach   clutter free environment maintained   fall prevention program maintained   lighting adjusted   nonskid shoes/slippers when out of bed   room organization consistent   safety round/check completed  Taken 10/10/2021 1930 by Karlee Pate RN  Safety Promotion/Fall Prevention:   assistive device/personal items within reach   clutter free environment maintained   fall prevention program maintained   lighting adjusted   nonskid shoes/slippers when out of bed   room organization consistent   safety round/check completed  Intervention: Prevent Skin Injury  Recent Flowsheet Documentation  Taken 10/11/2021 0316 by Karlee Pate RN  Body Position: position changed independently  Taken 10/11/2021 0110 by Karlee Pate RN  Body Position: position changed independently  Taken 10/10/2021 2316 by Karlee Pate RN  Body Position: position changed independently  Taken 10/10/2021 2120 by Karlee Ptae RN  Body Position: position changed independently  Taken 10/10/2021 1930 by Karlee Pate RN  Body Position: position changed independently  Intervention: Prevent Infection  Recent Flowsheet Documentation  Taken 10/11/2021 0316 by Karlee Pate RN  Infection Prevention:   hand hygiene promoted   personal protective equipment utilized  Taken 10/11/2021 0110 by Karlee Pate RN  Infection Prevention:   hand hygiene promoted   personal protective equipment utilized  Taken 10/10/2021 2316 by Karlee Pate RN  Infection Prevention:   hand hygiene promoted   personal protective equipment utilized  Taken 10/10/2021 2120 by Karlee Pate RN  Infection Prevention:   hand hygiene promoted   personal protective equipment utilized  Goal: Optimal Comfort and Wellbeing  Intervention: Provide Person-Centered Care  Recent Flowsheet Documentation  Taken 10/10/2021 1930 by Karlee Pate RN  Trust Relationship/Rapport: care explained  Goal: Readiness for Transition  of Care  Intervention: Mutually Develop Transition Plan  Recent Flowsheet Documentation  Taken 10/10/2021 2014 by Karlee Pate RN  Equipment Currently Used at Home: none  Transportation Anticipated: health plan transportation  Patient/Family Anticipated Services at Transition:      home health care  Patient/Family Anticipates Transition to: home with help/services     Problem: Asthma Comorbidity  Goal: Maintenance of Asthma Control  Intervention: Maintain Asthma Symptom Control  Recent Flowsheet Documentation  Taken 10/11/2021 0316 by Karlee Pate RN  Medication Review/Management: medications reviewed  Taken 10/10/2021 1930 by Karlee Pate RN  Medication Review/Management: medications reviewed     Problem: COPD Comorbidity  Goal: Maintenance of COPD Symptom Control  Intervention: Maintain COPD-Symptom Control  Recent Flowsheet Documentation  Taken 10/11/2021 0316 by Karlee Pate RN  Medication Review/Management: medications reviewed  Taken 10/10/2021 1930 by Karlee Pate RN  Medication Review/Management: medications reviewed     Problem: Heart Failure Comorbidity  Goal: Maintenance of Heart Failure Symptom Control  Intervention: Maintain Heart Failure-Management Strategies  Recent Flowsheet Documentation  Taken 10/11/2021 0316 by Karlee Pate RN  Medication Review/Management: medications reviewed  Taken 10/10/2021 1930 by Karlee Pate RN  Medication Review/Management: medications reviewed     Problem: Hypertension Comorbidity  Goal: Blood Pressure in Desired Range  Intervention: Maintain Hypertension-Management Strategies  Recent Flowsheet Documentation  Taken 10/11/2021 0316 by Karlee Pate RN  Medication Review/Management: medications reviewed  Taken 10/10/2021 1930 by Karlee Pate RN  Medication Review/Management: medications reviewed     Problem: Pain Chronic (Persistent) (Comorbidity Management)  Goal: Acceptable Pain Control and Functional  Ability  Intervention: Manage Persistent Pain  Recent Flowsheet Documentation  Taken 10/11/2021 0316 by Karlee Pate RN  Medication Review/Management: medications reviewed  Taken 10/10/2021 1930 by Karlee Pate RN  Medication Review/Management: medications reviewed  Intervention: Optimize Psychosocial Wellbeing  Recent Flowsheet Documentation  Taken 10/10/2021 1930 by Karlee Pate RN  Diversional Activities: television  Family/Support System Care: support provided     Problem: Seizure Disorder Comorbidity  Goal: Maintenance of Seizure Control  Intervention: Maintain Seizure-Symptom Control  Recent Flowsheet Documentation  Taken 10/11/2021 0316 by Karlee Pate RN  Seizure Precautions:   activity supervised   clutter-free environment maintained   Goal Outcome Evaluation:      Pt with confusion. Pt pulled out IV this shift. IV replaced. Pt resting throughout the shift with no other complaints. Will continue to monitor.

## 2021-10-11 NOTE — THERAPY EVALUATION
Patient Name: Maddison Vasquez  : 1940    MRN: [id number]                              Today's Date: 10/11/2021       Admit Date: 10/10/2021    Visit Dx:     ICD-10-CM ICD-9-CM   1. Contusion of other part of head, initial encounter  S00.83XA 920   2. Weakness  R53.1 780.79   3. Accidental carbamazepine poisoning, initial encounter  T42.1X1A 966.3     E855.0   4. Contusion of left knee, initial encounter  S80.02XA 924.11     Patient Active Problem List   Diagnosis   • Hypertension   • Seizures (HCC)   • Dementia (HCC)   • Lung nodule < 6cm on CT   • Thoracic spine fracture (HCC)   • Hyponatremia   • Anemia   • Head contusion     Past Medical History:   Diagnosis Date   • Dementia (HCC)    • Hypertension    • Seizures (HCC)      Past Surgical History:   Procedure Laterality Date   • BACK SURGERY     • BRAIN SURGERY        General Information     Row Name 10/11/21 [facility]9          Physical Therapy Time and Intention    Document Type evaluation  -     Mode of Treatment physical therapy  -     Row Name 10/11/21 1119          General Information    Prior Level of Function independent:; all household mobility; ADL's  Difficult to determine due to pt's confusion/dementia. PLOF from case management  -     Existing Precautions/Restrictions fall  Pt is high falls risk  -     Barriers to Rehab cognitive status; hearing deficit  Difficulty hearing on L  -     Row Name 10/11/21 1119          Living Environment    Lives With alone; other (see comments)   caregiver  -     Row Name 10/11/21 1119          Home Main Entrance    Number of Stairs, Main Entrance --  Difficult to assess due to pt confusion  -     Row Name 10/11/21 1119          Cognition    Orientation Status (Cognition) oriented to; person; disoriented to; place; situation; time  -     Row Name 10/11/21 1119          Safety Issues, Functional Mobility    Safety Issues Affecting Function (Mobility) insight into deficits/self-awareness; judgment;  positioning of assistive device; problem-solving; safety precaution awareness; safety precautions follow-through/compliance; sequencing abilities; awareness of need for assistance; at risk behavior observed; friction/shear risk; ability to follow commands  -MCKAYLA     Impairments Affecting Function (Mobility) balance; endurance/activity tolerance; motor planning; strength  -MCKAYLA     Comment, Safety Issues/Impairments (Mobility) Pt has difficulty with following commands as she becomes confused following commands  -MCKAYLA           User Key  (r) = Recorded By, (t) = Taken By, (c) = Cosigned By    Initials Name Provider Type    Ivonne Stokes PT Physical Therapist               Mobility     Row Name 10/11/21 1125          Bed Mobility    Bed Mobility supine-sit  -MCKAYLA     Supine-Sit [place] (Bed Mobility) modified independence  -     Assistive Device (Bed Mobility) bed rails  -MCKAYLA     Row Name 10/11/21 1125          Sit-Stand Transfer    Sit-Stand [place] (Transfers) contact guard  -     Assistive Device (Sit-Stand Transfers) walker, front-wheeled  -MCKAYLA     Row Name 10/11/21 1125          Gait/Stairs (Locomotion)    [place] Level (Gait) minimum assist (75% patient effort)  -MCKAYLA     Assistive Device (Gait) walker, front-wheeled  -MCKAYLA     Distance in Feet (Gait) 45ft  -MCKAYLA     Deviations/Abnormal Patterns (Gait) gait speed decreased; stride length decreased  -MCKAYLA     Bilateral Gait Deviations heel strike decreased  -MCKAYLA     Comment (Gait/Stairs) During ambulation within room pt was grazing close to walls with use of RW. She required min A at RW during ambulation to prevent her from running into wall.  -MCKAYLA           User Key  (r) = Recorded By, (t) = Taken By, (c) = Cosigned By    Initials Name Provider Type    Ivonne Stokes PT Physical Therapist               Obj/Interventions     Row Name 10/11/21 1127          Range of Motion Comprehensive    General Range of Motion bilateral lower extremity ROM WFL  -MCKAYLA      Row Name 10/11/21 1127          Strength Comprehensive (MMT)    General Manual Muscle Testing (MMT) Assessment lower extremity strength deficits identified  -     Comment, General Manual Muscle Testing (MMT) Assessment Difficult to assess due to pt's ability to follow commands. BLE hip flexion 3+/5, knee flex/ext 4-/5, ankle DF 4-/5.  -     Row Name 10/11/21 1127          Balance    Balance Assessment sitting static balance; sitting dynamic balance; standing static balance; standing dynamic balance  -     Static Sitting Balance WFL  -     Dynamic Sitting Balance WFL  -     Static Standing Balance mild impairment; unsupported; WFL; supported  -     Dynamic Standing Balance mild impairment; supported  -     Row Name 10/11/21 1127          Sensory Assessment (Somatosensory)    Sensory Assessment (Somatosensory) sensation intact  -           User Key  (r) = Recorded By, (t) = Taken By, (c) = Cosigned By    Initials Name Provider Type    Ivonne Stokes, PT Physical Therapist               Goals/Plan     Row Name 10/11/21 1136          Bed Mobility Goal 1 (PT)    Activity/Assistive Device (Bed Mobility Goal 1, PT) bed mobility activities, all  -MCKAYLA     Graham Level/Cues Needed (Bed Mobility Goal 1, PT) independent  -MCKAYLA     Time Frame (Bed Mobility Goal 1, PT) long term goal (LTG); 2 weeks  -[facility] Name 10/11/21 1136          Transfer Goal 1 (PT)    Activity/Assistive Device (Transfer Goal 1, PT) transfers, all  -MCKAYLA     [place]raham Level/Cues Needed (Transfer Goal 1, PT) modified independence  -MCKAYLA     Time Frame (Transfer Goal 1, PT) long term goal (LTG); 2 weeks  -[facility] Name 10/11/21 1136          Gait Training Goal 1 (PT)    Activity/Assistive Device (Gait Training Goal 1, PT) gait (walking locomotion); walker, rolling  -MCKAYLA     Graham Level (Gait Training Goal 1, PT) modified independence  -MCKAYLA     Distance (Gait Training Goal 1, PT) 150ft  -MCKAYLA     Time Frame (Gait Training Goal 1,  PT) long term goal (LTG); 2 weeks  -MCKAYLA           User Key  (r) = Recorded By, (t) = Taken By, (c) = Cosigned By    Initials Name Provider Type    Ivonne Stokes PT Physical Therapist               Clinical Impression     Row Name 10/11/21 1128          Pain    Additional Documentation Pain Scale: FACES Pre/Post-Treatment (Group)  -MCKAYLA     Row Name 10/11/21 1128          Pain Scale: FACES Pre/Post-Treatment    Pain: FACES Scale, Pretreatment 0-->no hurt  -MCKAYLA     Posttreatment Pain Rating 0-->no hurt  -MCKAYLA     Row Name 10/11/21 1128          Plan of Care Review    Outcome Summary Pt is an 81 year old female who is s/p fall and soft tissue hematoma of left eye. Pt has a PMH of dementia and h/o seizures. CT head - for acute findings. Due to pt confusion PLOF difficult to assess. From : pt lives alone w/ 24/7 care. This date pt demonstrated unsafe ambulation with RW x45 ft by grazing walls and requiring min A at RW to prevent pt from running into walls. Pt is a high falls risk. Recommend inpatient rehab as patient is high falls risk and is demonstrating unsafe behavior with RW. PT will continue to follow during hospital stay.  -     Row Name 10/11/21 1128          Therapy Assessment/Plan (PT)    Predicted Duration of Therapy Intervention (PT) Until d/c  -     Row Name 10/11/21 1128          Vital Signs    O2 Delivery Pre Treatment room air  -     O2 Delivery Post Treatment room air  -     Row Name 10/11/21 1128          Positioning and Restraints    Pre-Treatment Position in bed  -     Post Treatment Position bed  -MCKAYLA     In Bed supine; notified nsg; encouraged to call for assist; call light within reach; with family/caregiver  Sitter present  -           User Key  (r) = Recorded By, (t) = Taken By, (c) = Cosigned By    Initials Name Provider Type    Ivonne Stokes, PT Physical Therapist               Outcome Measures     Row Name 10/11/21 1138          How much help from another person do  you currently need...    Turning from your back to your side while in flat bed without using bedrails? 4  -MCKAYLA     Moving from lying on back to sitting on the side of a flat bed without bedrails? 3  -MCKAYLA     Moving to and from a bed to a chair (including a wheelchair)? 3  -MCKAYLA     Standing up from a chair using your arms (e.g., wheelchair, bedside chair)? 3  -MCKAYLA     Climbing 3-5 steps with a railing? 3  -MCKAYLA     To walk in hospital room? 3  -MCKAYLA     AM-PAC 6 Clicks Score (PT) 19  -     Row Name 10/11/21 1138          Functional Assessment    Outcome Measure Options AM-PAC 6 Clicks Basic Mobility (PT)  -           User Key  (r) = Recorded By, (t) = Taken By, (c) = Cosigned By    Initials Name Provider Type    Ivonne Stokes, PT Physical Therapist                             Physical Therapy Education                 Title: PT OT SLP Therapies (In Progress)     Topic: Physical Therapy (In Progress)     Point: Mobility training (In Progress)     Learning Progress Summary           Patient Acceptance, E,TB, NR by  at 10/11/2021 1139     by  at 10/11/2021 1138                   Point: Home exercise program (In Progress)     Learning Progress Summary           Patient Acceptance, E,TB, NR by  at 10/11/2021 1139     by  at 10/11/2021 1138                   Point: Body mechanics (In Progress)     Learning Progress Summary           Patient Acceptance, E,TB, NR by  at 10/11/2021 1139     by  at 10/11/2021 1138                   Point: Precautions (In Progress)     Learning Progress Summary           Patient Acceptance, E,TB, NR by  at 10/11/2021 1139     by  at 10/11/2021 1138                               User Key     Initials Effective Dates Name Provider Type [facility] 08/23/21 -  Ivonne Mackenzie, PT Physical Therapist PT              PT Recommendation and Plan  Planned Therapy Interventions (PT): balance training, bed mobility training, gait training, home exercise program, motor coordination  training, neuromuscular re-education, patient/family education, postural re-education, ROM (range of motion), transfer training, stair training, strengthening  Outcome Summary: Pt is an 81 year old female who is s/p fall and soft tissue hematoma of left eye. Pt has a PMH of dementia and h/o seizures. CT head - for acute findings. Due to pt confusion PLOF difficult to assess. From : pt lives alone w/ 24/7 care. This date pt demonstrated unsafe ambulation with RW x45 ft by grazing walls and requiring min A at RW to prevent pt from running into walls. Pt is a high falls risk. Recommend inpatient rehab as patient is high falls risk and is demonstrating unsafe behavior with RW. PT will continue to follow during hospital stay.     Time Calculation:    PT Charges     Row Name 10/11/21 1140             Time Calculation    Start Time 0945  -      Stop Time 0959  -      Time Calculation (min) 14 min  -MCKAYLA      PT Received On 10/11/21  -MCKAYLA      PT - Next Appointment 10/13/21  -MCKAYLA      PT Goal Re-Cert Due Date 10/25/21  -MCKAYLA            User Key  (r) = Recorded By, (t) = Taken By, (c) = Cosigned By    Initials Name Provider Type    Ivonne Stokes, PT Physical Therapist              Therapy Charges for Today     Code Description Service Date Service Provider Modifiers Qty    30523965234 HC PT EVAL MOD COMPLEXITY 4 10/11/2021 Ivonne Mackenzie PT GP 1          PT G-Codes  Outcome Measure Options: AM-PAC 6 Clicks Basic Mobility (PT)  AM-PAC 6 Clicks Score (PT): 19    Ivonne Mackenzie PT  10/11/2021

## 2021-10-11 NOTE — PLAN OF CARE
Goal Outcome Evaluation:  Plan of Care Reviewed With: patient        Progress: no change  Outcome Summary: confused this AM. Pulled IV out .Remains risk for further falls & skin injury. Appetite good. sitter remains at bedside

## 2021-10-11 NOTE — DISCHARGE PLACEMENT REQUEST
"Kenia Decker (81 y.o. Female)             Date of Birth Social Security Number Address Home Phone MRN    1940  [address] [place] [address] [place] [address] [phone] [id number]    Orthodoxy Marital Status             None Single       Admission Date Admission Type Admitting Provider Attending Provider Department, Room/Bed    10/10/21 Emergency Tanner Ames MD Farley, Timothy Michael, MD [facility] 2C MEDICAL INPATIENT, 248/1    Discharge Date Discharge Disposition Discharge Destination                         Attending Provider: Tanner Ames MD    Allergies: No Known Allergies    Isolation: None   Infection: None   Code Status: CPR   Advance Care Planning Activity    Ht: 154.9 cm (61\")   Wt: 51.5 kg (113 lb 9.6 oz)    Admission Cmt: None   Principal Problem: None                Active Insurance as of 10/10/2021     Primary Coverage     Payor Plan Insurance Group Employer/Plan Group    MEDICARE MEDICARE A & B      Payor Plan Address Payor Plan Phone Number Payor Plan Fax Number Effective Dates    [address] [phone]  8/1/2013 - None Entered    [facility] [id number]       Subscriber Name Subscriber Birth Date Member ID       KENIA DECKER L 1940 [id number]           Secondary Coverage     Payor Plan Insurance Group Employer/Plan Group    [place]      Payor Plan Address Payor Plan Phone Number Payor Plan Fax Number Effective Dates    [address]   11/24/2019 - None Entered    [place] [address]       Subscriber Name Subscriber Birth Date Member ID       KENIA DECKER L 1940 [id number]                 Emergency Contacts      (Rel.) Home Phone Work Phone Mobile Phone    JULIO GHOTRA (Grandchild) [phone] -- --            "

## 2021-10-11 NOTE — CASE MANAGEMENT/SOCIAL WORK
Continued Stay Note  LENKA Eugenio     Patient Name: Maddison Vasquez  MRN: [id number]  Today's Date: 10/11/2021    Admit Date: 10/10/2021     Discharge Plan     Row Name 10/11/21 1446       Plan    Plan Declined IP rehab. Anticipate routine home with 24/7 caregiver. Current with [facility] , JENNIFER order placed.    Plan Comments Receive a return phone call from Ruperto ANDRADE and he informed he works with [facility]. Referral to Kira lucero to confirm if patient is current. She confirmed patient is current and sees Dwight Woodard with [facility]. Updated careteams.                Expected Discharge Date and Time     Expected Discharge Date Expected Discharge Time    Oct 13, 2021         Phone communication or documentation only - no physical contact with patient or family.      Cecily Chen RN

## 2021-10-11 NOTE — CONSULTS
Referring Provider: Tanner Ames MD  Reason for Consultation: Subgaleal hematoma    Patient Care Team:  Dwight Woodard MD as PCP - General (Physical Medicine and Rehabilitation)    Chief complaint falls    Subjective .     History of present illness:  Maddison Vasquez is a 81 y.o. female who presented to ED after sustaining fall at home. Most of HPI gathered from chart review.  Evidently she lost her balance and fell.  She does have 24-hour care due to baseline dementia.  Imaging demonstrated soft tissue swelling as well as a large left frontal subgaleal hematoma.  She has had no change in her baseline mental status.  She does have a seizure disorder and neurology is following.  Neurosurgery has been consulted for further assessment evaluation and recommendations.  No family at bedside.  She reports no headache, seizure activity, dizziness, weakness.  Review of Systems  Review of Systems  Unable to obtain due to baseline dementia  History  Past Medical History:   Diagnosis Date   • Dementia (HCC)    • Hypertension    • Seizures (HCC)      Past Surgical History:   Procedure Laterality Date   • BACK SURGERY     • BRAIN SURGERY       Family History   Problem Relation Age of Onset   • No Known Problems Mother    • No Known Problems Father      Social History     Tobacco Use   • Smoking status: Light Tobacco Smoker     Packs/day: 0.25   • Smokeless tobacco: Never Used   Substance Use Topics   • Alcohol use: Never   • Drug use: Never     Medications Prior to Admission   Medication Sig Dispense Refill Last Dose   • albuterol sulfate  (90 Base) MCG/ACT inhaler Inhale 2 puffs Daily.      • amLODIPine (NORVASC) 10 MG tablet Take 10 mg by mouth Every Evening.   10/10/2021 at 0000   • atenolol (TENORMIN) 50 MG tablet Take 50 mg by mouth Every Morning.   10/9/2021 at Unknown time   • carBAMazepine (TEGretol) 100 MG chewable tablet Chew 150 mg 2 (Two) Times a Day.      • cetirizine (zyrTEC) 10 MG tablet Take 10 mg  by mouth Daily.      • Diclofenac Sodium (VOLTAREN) 1 % gel gel Apply 4 g topically to the appropriate area as directed 4 (Four) Times a Day As Needed. Knee pain as needed      • donepezil (ARICEPT) 5 MG tablet Take 5 mg by mouth Every Night.      • Fluticasone Furoate-Vilanterol (Breo Ellipta) 100-25 MCG/INH inhaler Inhale 1 puff Daily.      • HYDROcodone-acetaminophen (NORCO) 5-325 MG per tablet Take 1 tablet by mouth Every 12 (Twelve) Hours As Needed for Moderate Pain .      • sertraline (ZOLOFT) 50 MG tablet Take 50 mg by mouth Daily.   10/9/2021 at Unknown time   • vitamin D (ERGOCALCIFEROL) 1.25 MG (39861 UT) capsule capsule Take 50,000 Units by mouth 1 (One) Time Per Week.        Patient has no known allergies.    Scheduled Meds:amLODIPine, 10 mg, Oral, Daily  atenolol, 50 mg, Oral, Daily  budesonide-formoterol, 2 puff, Inhalation, BID - RT  carBAMazepine, 150 mg, Oral, BID  cetirizine, 10 mg, Oral, Daily  donepezil, 5 mg, Oral, Nightly  sertraline, 50 mg, Oral, Daily  sodium chloride, 10 mL, Intravenous, Q12H      Continuous Infusions:   PRN Meds:.hydrALAZINE  •  sodium chloride    Objective     Vital Signs   Vitals:    10/11/21 0415 10/11/21 0810 10/11/21 0820 10/11/21 1123   BP: (!) 197/71 156/68  142/78   BP Location: Right arm      Patient Position: Lying      Pulse: 55 59 60 64   Resp: 17  14 16   Temp: 98.9 °F (37.2 °C)   97.8 °F (36.6 °C)   TempSrc: Oral      SpO2: 99%  98% 98%   Weight:       Height:           Imaging:     CT HEAD WO CONTRAST-     Date of Exam: 10/10/2021 2:11 PM     Indication: trauma.     Comparison: 12/16/2020     Technique: CT scan of the head without IV contrast.  Automated exposure  control and iterative reconstruction methods were used.     FINDINGS  There is soft tissue swelling with a large subgaleal hematoma along the  left frontal scalp. The globes and intraorbital contents appear intact.  There is intracranial artifact from several surgical clips and aneurysm  coil packs  from prior procedures. No acute intracranial hemorrhage or  extra-axial collection is identified. The ventricles are stable in  caliber, with no evidence of mass effect or midline shift. The basal  cisterns appear patent. The gray-white differentiation appears  preserved. There are multiple foci of encephalomalacia from remote  insults similar to prior exam. Scattered foci of periventricular and  subcortical white matter hypodensities are nonspecific, but likely the  sequela of chronic small vessel ischemic disease.     No acute calvarial fracture is identified. There are changes from right  frontal craniotomy. The paranasal sinuses and mastoid air cells are  well-aerated.     IMPRESSION:  1.Soft tissue swelling with large subgaleal hematoma along left frontal  scalp.  2.No acute intracranial process or calvarial fracture identified.  3.Multiple foci of encephalomalacia with findings suggesting chronic  small vessel ischemic disease, similar to prior exam.     Electronically Signed By-Dmitry Spangler MD On:10/10/2021 2:37 PM  This report was finalized on [id number] by  Dmitry Spangler MD.    Physical Exam:     Physical Exam  Neurologic Exam    Alert and oriented by 1  Speech is intact and coherent.  Cranial nerves III through XII are grossly intact with pupils symmetric and reactive and no gaze paresis or nystagmus  Motor strength appears normal in both upper and lower extremities   No pronator drift  Gait is deferred  Large hematoma above left eyebrow as well as tissue swelling and bruising around left eye down left side of face into her neck.  Fairly tender around left eye but no tenderness on her left cheek  results Review:  Lab Results (last 24 hours)     Procedure Component Value Units Date/Time    Urine Culture - Urine, Urine, Clean Catch [[id number]]  (Normal) Collected: 10/10/21 1414    Specimen: Urine, Clean Catch Updated: 10/11/21 1144     Urine Culture No growth    Basic Metabolic Panel [[id number]]   (Abnormal) Collected: 10/11/21 0355    Specimen: Blood Updated: 10/11/21 0543     Glucose 98 mg/dL      BUN 25 mg/dL      Creatinine 1.02 mg/dL      Sodium 140 mmol/L      Potassium 4.3 mmol/L      Chloride 109 mmol/L      CO2 19.0 mmol/L      Calcium 8.4 mg/dL      eGFR Non African Amer 52 mL/min/1.73      BUN/Creatinine Ratio 24.5     Anion Gap 12.0 mmol/L     Narrative:      GFR Normal >60  Chronic Kidney Disease <60  Kidney Failure <15      Carbamazepine Level, Total [[id number]]  (Normal) Collected: 10/11/21 0355    Specimen: Blood Updated: 10/11/21 0543     Carbamazepine Level 8.2 mcg/mL     CBC & Differential [[id number]]  (Abnormal) Collected: 10/11/21 0355    Specimen: Blood Updated: 10/11/21 0501    Narrative:      The following orders were created for panel order CBC & Differential.  Procedure                               Abnormality         Status                     ---------                               -----------         ------                     CBC Auto Differential[[id number]]        Abnormal            Final result                 Please view results for these tests on the individual orders.    CBC Auto Differential [[id number]]  (Abnormal) Collected: 10/11/21 0355    Specimen: Blood Updated: 10/11/21 0501     WBC 6.90 10*3/mm3      RBC 2.85 10*6/mm3      Hemoglobin 9.5 g/dL      Hematocrit 27.4 %      MCV 96.1 fL      MCH 33.3 pg      MCHC 34.7 g/dL      RDW 12.9 %      RDW-SD 43.3 fl      MPV 7.8 fL      Platelets 229 10*3/mm3      Neutrophil % 65.4 %      Lymphocyte % 24.3 %      Monocyte % 8.2 %      Eosinophil % 1.4 %      Basophil % 0.7 %      Neutrophils, Absolute 4.50 10*3/mm3      Lymphocytes, Absolute 1.70 10*3/mm3      Monocytes, Absolute 0.60 10*3/mm3      Eosinophils, Absolute 0.10 10*3/mm3      Basophils, Absolute 0.00 10*3/mm3      nRBC 0.0 /100 WBC           Imaging Results (Last 24 Hours)     ** No results found for the last 24 hours. **            Assessment/Plan        Hypertension    Seizures (HCC)    Dementia (HCC)    Anemia    Head contusion          ASSESSMENT: Ms. Rutherford is a pleasantly confused 81-year-old female with underlying dementia who suffered mechanical fall at home obviously striking her head.  She is on Aricept as well as on Tegretol for story of seizure disorder.  Neurology is following.  Patient was able to perform assessment maneuvers.  There was no weakness noted. She has no vision changesShe does also have left-sided periorbital bruising and large amount of swelling above her left eye.  .Her hemoglobin is stable. CT imaging has been reviewed demonstrating fairly large subgaleal hematoma.  There was no intracranial process. This blood should resolve over time.  No neurosurgical intervention.  If noticeable increase in blood collections then follow up CT of the head should be obtained.     PLAN:   No neurosurgical intervention at this time      This patient was seen and examined using appropriate personal protective equipment.    I discussed the patient's findings and recommendations with patient, nursing staff and Dr. Lopez.    Valerie Garrido, APRN  10/11/21  15:42 EDT

## 2021-10-11 NOTE — PLAN OF CARE
Goal Outcome Evaluation:     Outcome Summary  Pt is an 81 year old female who is s/p fall and soft tissue hematoma of left eye. Pt has a PMH of dementia and h/o seizures. CT head - for acute findings. Due to pt confusion PLOF difficult to assess. From : pt lives alone w/ 24/7 care. This date pt demonstrated unsafe ambulation with RW x45 ft by grazing walls and requiring min A at RW to prevent pt from running into walls. Pt is a high falls risk. Pt requires CGA for STS transfers for safety. Recommend inpatient rehab as patient is high falls risk and is demonstrating unsafe behavior with RW. PT will continue to follow during hospital stay.

## 2021-10-11 NOTE — PROGRESS NOTES
[facility] Medicine Services Daily Progress Note    Patient Name: Maddison Vasquez  : 1940  MRN: [id number]  Primary Care Physician:  Provider, No Known  Date of admission: 10/10/2021      Subjective      Chief Complaint: Fall      Patient requiring sitter at bedside she continues to pull out IVs.  Patient able to converse reports some soreness on left forehead.  No chest pain or shortness of breath.    Review of Systems   Constitutional: Negative for chills and fever.   HENT: Negative for hoarse voice and stridor.    Eyes: Negative for double vision and photophobia.   Cardiovascular: Negative for chest pain and palpitations.   Respiratory: Negative for cough and shortness of breath.    Musculoskeletal: Negative for falls and stiffness.   Gastrointestinal: Negative for nausea and vomiting.   Genitourinary: Negative for dysuria and flank pain.   Neurological: Positive for headaches. Negative for dizziness.   Psychiatric/Behavioral: Negative for altered mental status. The patient is not nervous/anxious.         Objective      Vitals:   Temp:  [97.8 °F (36.6 °C)-98.9 °F (37.2 °C)] 97.8 °F (36.6 °C)  Heart Rate:  [55-69] 64  Resp:  [14-17] 16  BP: (142-197)/(66-79) 142/78    Physical Exam     General: Elderly female sitting up in bed breathing company on room air no acute distress  HEENT: Large left frontal hematoma, EOMI, scleral hemorrhage noted  Heart: Regular, rate controlled  Chest: Normal work of breathing, moving air well no wheezing  Abdominal: Soft. NT/ND.   Musculoskeletal: Normal ROM.  No edema. No calf tenderness.  Neurological: AAOx2, confused, no focal deficits  Skin: Skin is warm and dry. No rash  Psychiatric: Patient appearing calm.         Result Review    Result Review:  I have personally reviewed the results from the time of this admission to 10/11/2021 12:33 EDT and agree with these findings:  [x]  Laboratory  [x]  Microbiology  [x]  Radiology  [x]  EKG/Telemetry   []   Cardiology/Vascular   []  Pathology  []  Old records  []  Other:  Most notable findings include: sub galeal hematoma on ct, renal insufficiency          Assessment/Plan      Brief Patient Summary:    Maddison Vasquez is a 81 y.o. female who presented to [facility] on 10/10/2021 after sustaining a fall at home, patient has a 24-hour care at home.  As per ER physician, she last her balance.  Patient alert and oriented x0, as per family member she has been diagnosed with dementia.  Patient underwent CT head revealing no acute intracranial process, it revealed soft tissue hematoma around left eye.  Urine toxicology was negative.  Following this we were asked and the patient for observation and for safe discharge planning.  Patient UA is negative, chest x-ray no acute infiltrates, WBC normal, there is no evidence for any fever.    amLODIPine, 10 mg, Oral, Daily  atenolol, 50 mg, Oral, Daily  budesonide-formoterol, 2 puff, Inhalation, BID - RT  carBAMazepine, 150 mg, Oral, BID  cetirizine, 10 mg, Oral, Daily  donepezil, 5 mg, Oral, Nightly  sertraline, 50 mg, Oral, Daily  sodium chloride, 10 mL, Intravenous, Q12H       sodium chloride, 50 mL/hr, Last Rate: Stopped (10/11/21 0811)         Active Hospital Problems:  Active Hospital Problems    Diagnosis    • Head contusion    • Anemia    • Hypertension    • Seizures (HCC)    • Dementia (HCC)      Plan:     Fall-likely multifactorial, cannot rule out seizure though patient did not have any notable seizure activity and was supratherapeutic on their medication.  Patient's dementia and age likely plays a role  -PT/OT  -Patient's Tegretol level is now normal resume medication and monitor  -Given findings of hematoma, consult neurosurgery for evaluation but no likely intervention unless progression  -Palliative care for discussing CODE STATUS  -Patient not safe for returning to home  -Replace electrolytes  -Patient with some knee pain knee x-ray showing no signs of  fracture  -Monitor for signs of infection    Facial hematoma-on CT head findings of subgaleal hematoma, but no change in mental status  -Blood pressure control  -Patient needing sitter  -Neurosurgery consult    Renal insufficiency-patient likely with some prerenal component which has been slowly improving  -Monitor daily  -Avoid nephrotoxic medication    Seizure disorder-patient on Tegretol with supratherapeutic levels  -Held overnight and normalizing  -Resume home meds may need adjustment, to decrease  -Seizure precautions        DVT prophylaxis:  Mechanical DVT prophylaxis orders are present.    CODE STATUS:         Disposition:  I expect patient to be discharged in 24 to 48 hours if safe discharge plan found.    This patient has been examined wearing appropriate Personal Protective Equipment and discussed with hospital infection control department. 10/11/21      Electronically signed by Tanner Ames MD, 10/11/21, 12:33 EDT.  [facility] Hospitalist Team

## 2021-10-11 NOTE — CASE MANAGEMENT/SOCIAL WORK
Discharge Planning Assessment   Eugenio     Patient Name: Maddison Vasquez  MRN: [id number]  Today's Date: 10/11/2021    Admit Date: 10/10/2021     Discharge Needs Assessment     Row Name 10/11/21 1436       Living Environment    Lives With grandchild(ashanti); other (see comments)  caregiver 24/7    Current Living Arrangements home/apartment/condo    Primary Care Provided by self; homecare agency    Provides Primary Care For no one, unable/limited ability to care for self    Family Caregiver if Needed grandchild(ashanti), adult    Quality of Family Relationships helpful    Able to Return to Prior Arrangements yes       Resource/Environmental Concerns    Resource/Environmental Concerns none    Transportation Concerns car, none       Transition Planning    Patient/Family Anticipates Transition to home with help/services    Patient/Family Anticipated Services at Transition     Transportation Anticipated family or friend will provide       Discharge Needs Assessment    Readmission Within the Last 30 Days no previous admission in last 30 days    Current Outpatient/Agency/Support Group homecare agency    Equipment Currently Used at Home wheelchair; walker, rolling    Concerns to be Addressed discharge planning    Anticipated Changes Related to Illness inability to care for self    Equipment Needed After Discharge none    Provided Post Acute Provider List? N/A    N/A Provider List Comment current with a home health company, unsure of which agency    Current Discharge Risk chronically ill; cognitively impaired; physical impairment               Discharge Plan     Row Name 10/11/21 8655       Plan    Plan Declined IP rehab. Anticipate routine home with 24/7 caregiver. Current with [facility], pending agency name.    Patient/Family in Agreement with Plan yes    Plan Comments Due to patients confusion called and spoke to granddaughter Ila Earl. She reports patient has a 24/7 caregiver named Nadia that lives with  patient through her [employer]. Discussed the need for IP rehab and she declines at this time. hospitals patient has a home health agency that comes but does not know the name. Provided the PT numbers (# [phone]), called and left a  to determine which agency he works for, waiting on return phone call. Patient is current with [facility], called and left  to determine provider name and HH agency name, waiting on return phone call. Pharmacy confirmed. Granddaughter or caregiver to transport home on d/c. Will follow.                Expected Discharge Date and Time     Expected Discharge Date Expected Discharge Time    Oct 13, 2021          Demographic Summary     Row Name 10/11/21 1435       General Information    Admission Type observation    Arrived From emergency department    Required Notices Provided Observation Status Notice    Referral Source admission list    Reason for Consult discharge planning    Preferred Language English               Functional Status     Row Name 10/11/21 1436       Functional Status    Usual Activity Tolerance fair    Current Activity Tolerance poor       Functional Status, IADL    Medications assistive person    Meal Preparation assistive person    Housekeeping assistive person    Laundry assistive person    Shopping assistive person                    Patient Forms     Row Name 10/11/21 1438       Patient Forms    Patient Observation Letter Delivered    Delivered to Support person    Method of delivery Telephone  granddaniko Earl              Phone communication or documentation only - no physical contact with patient or family.      Cecily Chen RN

## 2021-10-12 VITALS
OXYGEN SATURATION: 98 % | SYSTOLIC BLOOD PRESSURE: 177 MMHG | WEIGHT: 114.5 LBS | TEMPERATURE: 98.6 F | RESPIRATION RATE: 16 BRPM | HEIGHT: 61 IN | DIASTOLIC BLOOD PRESSURE: 71 MMHG | HEART RATE: 55 BPM | BODY MASS INDEX: 21.62 KG/M2

## 2021-10-12 LAB
ANION GAP SERPL CALCULATED.3IONS-SCNC: 11 MMOL/L (ref 5–15)
BASOPHILS # BLD AUTO: 0 10*3/MM3 (ref 0–0.2)
BASOPHILS NFR BLD AUTO: 0.7 % (ref 0–1.5)
BUN SERPL-MCNC: 45 MG/DL (ref 8–23)
BUN/CREAT SERPL: 28.1 (ref 7–25)
CALCIUM SPEC-SCNC: 8.2 MG/DL (ref 8.6–10.5)
CHLORIDE SERPL-SCNC: 108 MMOL/L (ref 98–107)
CO2 SERPL-SCNC: 18 MMOL/L (ref 22–29)
CREAT SERPL-MCNC: 1.6 MG/DL (ref 0.57–1)
DEPRECATED RDW RBC AUTO: 43.3 FL (ref 37–54)
EOSINOPHIL # BLD AUTO: 0.1 10*3/MM3 (ref 0–0.4)
EOSINOPHIL NFR BLD AUTO: 1.3 % (ref 0.3–6.2)
ERYTHROCYTE [DISTWIDTH] IN BLOOD BY AUTOMATED COUNT: 12.9 % (ref 12.3–15.4)
GFR SERPL CREATININE-BSD FRML MDRD: 31 ML/MIN/1.73
GLUCOSE SERPL-MCNC: 109 MG/DL (ref 65–99)
HCT VFR BLD AUTO: 24.9 % (ref 34–46.6)
HGB BLD-MCNC: 8.5 G/DL (ref 12–15.9)
LYMPHOCYTES # BLD AUTO: 1.9 10*3/MM3 (ref 0.7–3.1)
LYMPHOCYTES NFR BLD AUTO: 31.8 % (ref 19.6–45.3)
MCH RBC QN AUTO: 32.5 PG (ref 26.6–33)
MCHC RBC AUTO-ENTMCNC: 34 G/DL (ref 31.5–35.7)
MCV RBC AUTO: 95.5 FL (ref 79–97)
MONOCYTES # BLD AUTO: 0.5 10*3/MM3 (ref 0.1–0.9)
MONOCYTES NFR BLD AUTO: 8.5 % (ref 5–12)
NEUTROPHILS NFR BLD AUTO: 3.5 10*3/MM3 (ref 1.7–7)
NEUTROPHILS NFR BLD AUTO: 57.7 % (ref 42.7–76)
NRBC BLD AUTO-RTO: 0.1 /100 WBC (ref 0–0.2)
PLATELET # BLD AUTO: 216 10*3/MM3 (ref 140–450)
PMV BLD AUTO: 7.8 FL (ref 6–12)
POTASSIUM SERPL-SCNC: 4 MMOL/L (ref 3.5–5.2)
QT INTERVAL: 406 MS
RBC # BLD AUTO: 2.61 10*6/MM3 (ref 3.77–5.28)
SODIUM SERPL-SCNC: 137 MMOL/L (ref 136–145)
WBC # BLD AUTO: 6.1 10*3/MM3 (ref 3.4–10.8)

## 2021-10-12 PROCEDURE — 85025 COMPLETE CBC W/AUTO DIFF WBC: CPT | Performed by: HOSPITALIST

## 2021-10-12 PROCEDURE — 94799 UNLISTED PULMONARY SVC/PX: CPT

## 2021-10-12 PROCEDURE — 97166 OT EVAL MOD COMPLEX 45 MIN: CPT

## 2021-10-12 PROCEDURE — 36415 COLL VENOUS BLD VENIPUNCTURE: CPT | Performed by: EMERGENCY MEDICINE

## 2021-10-12 PROCEDURE — 80048 BASIC METABOLIC PNL TOTAL CA: CPT | Performed by: EMERGENCY MEDICINE

## 2021-10-12 PROCEDURE — 99239 HOSP IP/OBS DSCHRG MGMT >30: CPT | Performed by: FAMILY MEDICINE

## 2021-10-12 RX ADMIN — BUDESONIDE AND FORMOTEROL FUMARATE DIHYDRATE 2 PUFF: 80; 4.5 AEROSOL RESPIRATORY (INHALATION) at 08:49

## 2021-10-12 RX ADMIN — CARBAMAZEPINE 150 MG: 100 SUSPENSION ORAL at 08:01

## 2021-10-12 RX ADMIN — AMLODIPINE BESYLATE 10 MG: 5 TABLET ORAL at 08:00

## 2021-10-12 RX ADMIN — CETIRIZINE HYDROCHLORIDE 10 MG: 10 TABLET, FILM COATED ORAL at 08:00

## 2021-10-12 RX ADMIN — ATENOLOL 50 MG: 50 TABLET ORAL at 08:00

## 2021-10-12 RX ADMIN — SERTRALINE HYDROCHLORIDE 50 MG: 50 TABLET ORAL at 08:00

## 2021-10-12 NOTE — NURSING NOTE
Pt. Found wandering from unit. Had gotten out of bed unassisted & alarm had not sounded. Brought back to floor by transporter. Has removed ID band/falls band mult. Times yesterday & today herself. Easily redirected, but short term memory is very poor. Has been climbing out of bed mult. Times. Sat up in chair for no more than few minutes before wanting to go back to bed. Chair alarm in use, but does not recognize safety alarm or why it is used. Has been given snacks & walked on unit with staff assist/walker x4 this afternoon. Family was notified of d/c orders when obtained & sitter is supposed to be on way to take home. CNA sitting with patient in room at this time.

## 2021-10-12 NOTE — CASE MANAGEMENT/SOCIAL WORK
Case Management Discharge Note      Final Note: [facility]    Provided Post Acute Provider List?: N/A  N/A Provider List Comment: current with a home health company, unsure of which agency    Selected Continued Care - Admitted Since 10/10/2021         Home Medical Care Coordination complete.    Service Provider Selected Services Address Phone Fax Patient Preferred    [facility] [address] [place] [address], [place] [address] [phone] -- --                       Final Discharge Disposition Code: 06 - home with home health care

## 2021-10-12 NOTE — PLAN OF CARE
Goal Outcome Evaluation:  Plan of Care Reviewed With: patient           Outcome Summary: 80 y/o female presenting to [facility] s/p fall at home with soft tissue hematoma around left eye.PMH: dementia and seizures. CT (-) for acute. Per CM, pt lives with grand daughter and has a caregiver 24/7. Pt oriented to self only. Pt supervision-CGA for all mobility and ADLs secondary to decreased cognition and poor safety awareness. OT recommending home with 24/7 care at d/c. OT will follow at [facility].

## 2021-10-12 NOTE — CASE MANAGEMENT/SOCIAL WORK
Continued Stay Note  [facility]     Patient Name: Maddison Vasquez  MRN: [id number]  Today's Date: 10/12/2021    Admit Date: 10/10/2021     Discharge Plan     Row Name 10/12/21 1250       Plan    Plan Comments Called and spoke to granddaughter Ila Earl, she continues to decline IP rehab. Informed Kira liaison with [facility] of potential d/c home today, awaiting MD rounds.              Expected Discharge Date and Time     Expected Discharge Date Expected Discharge Time    Oct 12, 2021         Phone communication or documentation only - no physical contact with patient or family.      Cecily Chen RN

## 2021-10-12 NOTE — DISCHARGE SUMMARY
[facility] Medicine Services  DISCHARGE SUMMARY    Patient Name: Maddison Vasquez  : 1940  MRN: [id number]    Date of Admission: 10/10/2021  Date of Discharge: 2021  Primary Care Physician: Dwight Woodard MD      Presenting Problem:   Weakness [R53.1]  Accidental carbamazepine poisoning, initial encounter [T42.1X1A]  Contusion of left knee, initial encounter [S80.02XA]  Contusion of other part of head, initial encounter [S00.83XA]    Active and Resolved Hospital Problems:  Active Hospital Problems    Diagnosis POA   • Head contusion [S00.93XA] Yes   • Anemia [D64.9] Yes   • Hypertension [I10] Yes   • Seizures (HCC) [R56.9] Yes   • Dementia (HCC) [F03.90] Yes      Resolved Hospital Problems   No resolved problems to display.     Fall-likely multifactorial, cannot rule out seizure though patient did not have any notable seizure activity and was supratherapeutic on their medication.  Patient's dementia and age likely plays a role  -PT/OT evaluated patient, patient lives at home with family in a 24-hour caregiver family wishes patient to go home  -Patient's Tegretol level is now normal resume medication and monitor  -Given findings of hematoma, consult neurosurgery for evaluation but no intervention required continue monitoring  -Palliative care for discussing CODE STATUS  -Replace electrolytes  -Patient with some knee pain knee x-ray showing no signs of fracture  -No signs of infection     Facial hematoma-on CT head findings of subgaleal hematoma, but no change in mental status  -Blood pressure control  -Avoid NSAIDs or aspirin  -Neurosurgery consult, no intervention required     Renal insufficiency-patient likely with some prerenal component which has been slowly improving     Seizure disorder-patient on Tegretol with supratherapeutic levels  -Held overnight and normalizing  -Resume home meds may need adjustment, to decrease  -Seizure precautions    Hospital  Course     Hospital Course:    Maddison Vasquez is a 81 y.o. female who presented to [facility] on 10/10/2021 after sustaining a fall at home, patient has a 24-hour care at home.  As per ER physician, she last her balance.  Patient alert and oriented x0, as per family member she has been diagnosed with dementia.  Patient underwent CT head revealing no acute intracranial process, it revealed soft tissue hematoma around left eye.  Urine toxicology was negative.  Following this we were asked and the patient for observation and for safe discharge planning.  Patient UA is negative, chest x-ray no acute infiltrates, WBC normal, there is no evidence for any fever.    October 11, 2021: Patient with intermittent confusion but overall answering questions appropriately.  Denies significant headache no visual deficits.  Neurosurgery consulted given sub galeal hematoma but appearing stable and no intervention surgically required.    October 12, 2021: Patient overall feeling well.  Palliative care consulted to discuss patient's CODE STATUS continue full code at this time.  Patient appears to have safe discharge plan with full-time caretaker in addition to family living at home.  Patient able to resume antiepileptics and all home medications.  Follow-up organized primary care.  Patient able to be discharged home in good condition with strict return precautions given.      DISCHARGE Follow Up Recommendations for labs and diagnostics:       Reasons For Change In Medications and Indications for New Medications:      Day of Discharge     Vital Signs:  Temp:  [97.7 °F (36.5 °C)-98.6 °F (37 °C)] 98.6 °F (37 °C)  Heart Rate:  [55-65] 55  Resp:  [14-16] 16  BP: (137-180)/(62-71) 177/71    Physical Exam:  Physical Exam     General: Elderly female sitting up in bed breathing company on room air no acute distress  HEENT: Large left frontal hematoma, EOMI, scleral hemorrhage noted  Heart: Regular, rate controlled  Chest: Normal  work of breathing, moving air well no wheezing  Abdominal: Soft. NT/ND.   Musculoskeletal: Normal ROM.  No edema. No calf tenderness.  Neurological: AAOx2, confused, no focal deficits  Skin: Skin is warm and dry. No rash  Psychiatric: Patient appearing calm.      Pertinent  and/or Most Recent Results     LAB RESULTS:      Lab 10/12/21  0515 10/11/21  0355 10/10/21  1312   WBC 6.10 6.90 5.60   HEMOGLOBIN 8.5* 9.5* 9.7*   HEMATOCRIT 24.9* 27.4* 28.5*   PLATELETS 216 229 227   NEUTROS ABS 3.50 4.50 4.00   LYMPHS ABS 1.90 1.70 1.20   MONOS ABS 0.50 0.60 0.30   EOS ABS 0.10 0.10 0.00   MCV 95.5 96.1 96.7   PROTIME  --   --  10.3   APTT  --   --  22.4*         Lab 10/12/21  0515 10/11/21  0355 10/10/21  1312   SODIUM 137 140 138   POTASSIUM 4.0 4.3 4.5   CHLORIDE 108* 109* 108*   CO2 18.0* 19.0* 18.0*   ANION GAP 11.0 12.0 12.0   BUN 45* 25* 30*   CREATININE 1.60* 1.02* 1.09*   GLUCOSE 109* 98 132*   CALCIUM 8.2* 8.4* 8.3*   MAGNESIUM  --   --  2.0   TSH  --   --  2.080         Lab 10/10/21  1312   TOTAL PROTEIN 6.5   ALBUMIN 3.70   GLOBULIN 2.8   ALT (SGPT) 12   AST (SGOT) 17   BILIRUBIN 0.2   ALK PHOS 75         Lab 10/10/21  1312   TROPONIN T <0.010   PROTIME 10.3   INR <0.93*                 Brief Urine Lab Results  (Last result in the past 365 days)      Color   Clarity   Blood   Leuk Est   Nitrite   Protein   CREAT   Urine HCG        10/10/21 1312 Yellow   Clear   Trace   Negative   Negative   100 mg/dL (2+)               Microbiology Results (last 10 days)     Procedure Component Value - Date/Time    Urine Culture - Urine, Urine, Clean Catch [[id number]]  (Normal) Collected: 10/10/21 1414    Lab Status: Final result Specimen: Urine, Clean Catch Updated: 10/11/21 2034     Urine Culture No growth    COVID-19,CEPHEID/JOSEPHINE/BDMAX,COR/DAMIAN/PAD/ANDREINA IN-HOUSE(OR EMERGENT/ADD-ON),NP SWAB IN TRANSPORT MEDIA 3-4 HR TAT, RT-PCR - Swab, Nasopharynx [[id number]]  (Normal) Collected: 10/10/21 1346    Lab Status: Final result  Specimen: Swab from Nasopharynx Updated: 10/10/21 1447     COVID19 Not Detected    Narrative:      Fact sheet for providers: https://www.fda.gov/media/268978/download     Fact sheet for patients: https://www.fda.gov/media/475923/download  Fact sheet for providers: https://www.fda.gov/media/298210/download     Fact sheet for patients: https://www.fda.gov/media/761534/download          XR Knee 1 or 2 View Left    Result Date: 10/10/2021  Impression: No acute fracture or traumatic malalignment identified.  Electronically Signed By-Dmitry Spangler MD On:10/10/2021 2:22 PM This report was finalized on [id number] by  Dmitry Spangler MD.    CT Head Without Contrast    Result Date: 10/10/2021  Impression: 1.Soft tissue swelling with large subgaleal hematoma along left frontal scalp. 2.No acute intracranial process or calvarial fracture identified. 3.Multiple foci of encephalomalacia with findings suggesting chronic small vessel ischemic disease, similar to prior exam.  Electronically Signed By-Dmitry Spangler MD On:10/10/2021 2:37 PM This report was finalized on [id number] by  Dmitry Spangler MD.    CT Cervical Spine Without Contrast    Result Date: 10/10/2021  Impression: No acute fracture or traumatic malalignment identified.  Electronically Signed BySigifredo Spangler MD On:10/10/2021 2:43 PM This report was finalized on [id number] by  Dmitry Spangler MD.    XR Chest 1 View    Result Date: 10/10/2021  Impression: Cardiomegaly with pulmonary vascular congestion.  Electronically Signed BySigifredo Spangler MD On:10/10/2021 2:23 PM This report was finalized on [id number] by  Dmitry Spangler MD.                  Labs Pending at Discharge:      Procedures Performed           Consults:   Consults     Date and Time Order Name Status Description    10/11/2021  3:15 PM Inpatient Neurosurgery Consult Completed     10/10/2021  3:30 PM Hospitalist (on-call MD unless specified) Completed             Discharge  Details        Discharge Medications      Continue These Medications      Instructions Start Date   albuterol sulfate  (90 Base) MCG/ACT inhaler  Commonly known as: PROVENTIL HFA;VENTOLIN HFA;PROAIR HFA   2 puffs, Inhalation, Daily      amLODIPine 10 MG tablet  Commonly known as: NORVASC   10 mg, Oral, Every Evening      atenolol 50 MG tablet  Commonly known as: TENORMIN   50 mg, Oral, Every Morning      Breo Ellipta 100-25 MCG/INH inhaler  Generic drug: Fluticasone Furoate-Vilanterol   1 puff, Inhalation, Daily - RT      carBAMazepine 100 MG chewable tablet  Commonly known as: TEGretol   150 mg, Oral, 2 Times Daily      cetirizine 10 MG tablet  Commonly known as: zyrTEC   10 mg, Oral, Daily      Diclofenac Sodium 1 % gel gel  Commonly known as: VOLTAREN   4 g, Topical, 4 Times Daily PRN, Knee pain as needed       donepezil 5 MG tablet  Commonly known as: ARICEPT   5 mg, Oral, Nightly      HYDROcodone-acetaminophen 5-325 MG per tablet  Commonly known as: NORCO   1 tablet, Oral, Every 12 Hours PRN      sertraline 50 MG tablet  Commonly known as: ZOLOFT   50 mg, Oral, Daily      vitamin D 1.25 MG (82868 UT) capsule capsule  Commonly known as: ERGOCALCIFEROL   50,000 Units, Oral, Weekly             No Known Allergies      Discharge Disposition: Good  Home or Self Care    Diet:  Hospital:  Diet Order   Procedures   • Diet Regular         Discharge Activity:   Activity Instructions     Activity as Tolerated              CODE STATUS:  Code Status and Medical Interventions:   Ordered at: 10/11/21 1236     Code Status:    CPR     Medical Interventions (Level of Support Prior to Arrest):    Full         No future appointments.    Additional Instructions for the Follow-ups that You Need to Schedule     Ambulatory Referral to Home Health   As directed      Face to Face Visit Date: 10/11/2021    Follow-up provider for Plan of Care?: I treated the patient in an acute care facility and will not continue treatment after  discharge.    Follow-up provider: DWIGHT SPARKS [[id number]]    Reason/Clinical Findings: post hospital evaluation    Describe mobility limitations that make leaving home difficult: weakness    Nursing/Therapeutic Services Requested: Other ([facility] to evaluate)    Frequency: 1 Week 1         Discharge Follow-up with PCP   As directed       Currently Documented PCP:    Dwight Sparks MD    PCP Phone Number:    [phone]     Follow Up Details: Please follow-up with your primary care doctor in 1 week               Time spent on Discharge including face to face service:  35 minutes    This patient has been examined wearing appropriate Personal Protective Equipment and discussed with hospital infection control department. 10/12/21      Signature: Electronically signed by Tanner Ames MD, 10/12/21, 1:48 PM EDT.

## 2021-10-12 NOTE — THERAPY EVALUATION
Patient Name: Maddison Vasquez  : 1940    MRN: [id number]                              Today's Date: 10/12/2021       Admit Date: 10/10/2021    Visit Dx:     ICD-10-CM ICD-9-CM   1. Contusion of other part of head, initial encounter  S00.83XA 920   2. Weakness  R53.1 780.79   3. Accidental carbamazepine poisoning, initial encounter  T42.1X1A 966.3     E855.0   4. Contusion of left knee, initial encounter  S80.02XA 924.11     Patient Active Problem List   Diagnosis   • Hypertension   • Seizures (HCC)   • Dementia (HCC)   • Lung nodule < 6cm on CT   • Thoracic spine fracture (HCC)   • Hyponatremia   • Anemia   • Head contusion     Past Medical History:   Diagnosis Date   • Dementia (HCC)    • Hypertension    • Seizures (HCC)      Past Surgical History:   Procedure Laterality Date   • BACK SURGERY     • BRAIN SURGERY        General Information     Row Name 10/12/21 [facility]7          OT Time and Intention    Document Type evaluation  -BL     Mode of Treatment occupational therapy  -BL     Row Name 10/12/21 1237          General Information    Patient Profile Reviewed yes  -BL     Prior Level of Function independent:; all household mobility; ADL's  -BL     Existing Precautions/Restrictions fall  -BL     Barriers to Rehab cognitive status  -BL     Row Name 10/12/21 1237          Living Environment    Lives With grandchild(ashanti); other (see comments)   caregiver  -BL     Row Name 10/12/21 1237          Home Main Entrance    Stair Railings, Main Entrance none  -BL     Row Name 10/12/21 1237          Cognition    Orientation Status (Cognition) oriented to; person; disoriented to; place; situation; time  -BL     Row Name 10/12/21 1237          Safety Issues, Functional Mobility    Safety Issues Affecting Function (Mobility) awareness of need for assistance; impulsivity; insight into deficits/self-awareness; positioning of assistive device; problem-solving; safety precautions follow-through/compliance; sequencing  abilities  -     Impairments Affecting Function (Mobility) balance; endurance/activity tolerance; motor planning; strength  -     Comment, Safety Issues/Impairments (Mobility) poor command following  -           User Key  (r) = Recorded By, (t) = Taken By, (c) = Cosigned By    Initials Name Provider Type    BL Keturah Proctor OT Occupational Therapist                 Mobility/ADL's     Row Name 10/12/21 1238          Bed Mobility    Bed Mobility supine-sit  -BL     Supine-Sit [place] (Bed Mobility) modified independence  -     Assistive Device (Bed Mobility) bed rails  -     Row Name 10/12/21 1238          Transfers    Transfers toilet transfer  -     Sit-Stand [place] (Transfers) contact guard  -     [place] Level (Toilet Transfer) supervision  -     Row Name 10/12/21 1238          Sit-Stand Transfer    Assistive Device (Sit-Stand Transfers) walker, front-wheeled  -     Row Name 10/12/21 1238          Toilet Transfer    Type (Toilet Transfer) sit-stand; stand-sit  -     Row Name 10/12/21 1238          Activities of Daily Living    BADL Assessment/Intervention grooming  -     Row Name 10/12/21 1238          Grooming Assessment/Training    [place] Level (Grooming) oral care regimen; wash face, hands; supervision  -     Position (Grooming) sink side  -           User Key  (r) = Recorded By, (t) = Taken By, (c) = Cosigned By    Initials Name Provider Type    BL Keturah Proctor OT Occupational Therapist               Obj/Interventions     Row Name 10/12/21 1239          Sensory Assessment (Somatosensory)    Sensory Assessment (Somatosensory) sensation intact  -     Row Name 10/12/21 1239          Vision Assessment/Intervention    Visual Impairment/Limitations unable/difficult to assess  -     Row Name 10/12/21 1239          Range of Motion Comprehensive    General Range of Motion bilateral upper extremity ROM WFL  -     Row Name 10/12/21 1239          Strength  Comprehensive (MMT)    Comment, General Manual Muscle Testing (MMT) Assessment MMT not formally tested secondary to AMS; BUE appear to be grossly 4/5  -BL     Row Name 10/12/21 1239          Balance    Balance Assessment sitting static balance; sitting dynamic balance; standing static balance; standing dynamic balance  -BL     Static Sitting Balance WFL  -BL     Dynamic Sitting Balance WFL  -BL     Static Standing Balance WFL; supported  -BL     Dynamic Standing Balance mild impairment; supported  -BL           User Key  (r) = Recorded By, (t) = Taken By, (c) = Cosigned By    Initials Name Provider Type     Keturah Proctor, OT Occupational Therapist               Goals/Plan     Row Name 10/12/21 1245          Transfer Goal 1 (OT)    Activity/Assistive Device (Transfer Goal 1, OT) sit-to-stand/stand-to-sit; toilet  -BL     [place] Level/Cues Needed (Transfer Goal 1, OT) standby assist  -BL     Time Frame (Transfer Goal 1, OT) long term goal (LTG); 2 weeks  -     Row Name 10/12/21 1245          Dressing Goal 1 (OT)    Activity/Device (Dressing Goal 1, OT) dressing skills, all  -BL     [place]/Cues Needed (Dressing Goal 1, OT) standby assist  -BL     Time Frame (Dressing Goal 1, OT) long term goal (LTG); 2 weeks  -     Row Name 10/12/21 1245          Grooming Goal 1 (OT)    Activity/Device (Grooming Goal 1, OT) grooming skills, all  -BL     [place] (Grooming Goal 1, OT) standby assist  -BL     Time Frame (Grooming Goal 1, OT) long term goal (LTG); 2 weeks  -     Row Name 10/12/21 1241          Therapy Assessment/Plan (OT)    Planned Therapy Interventions (OT) BADL retraining; IADL retraining; cognitive/visual perception retraining; activity tolerance training; ROM/therapeutic exercise; strengthening exercise; passive ROM/stretching; transfer/mobility retraining; patient/caregiver education/training  -           User Key  (r) = Recorded By, (t) = Taken By, (c) = Cosigned By    Initials Name  Provider Type     Keturah Proctor OT Occupational Therapist               Clinical Impression     Row Name 10/12/21 124          Pain Scale: FACES Pre/Post-Treatment    Pain: FACES Scale, Pretreatment 0-->no hurt  -BL     Posttreatment Pain Rating 0-->no hurt  -BL     Row Name 10/12/21 [facility]          Plan of Care Review    Plan of Care Reviewed With patient  -     Outcome Summary 82 y/o female presenting to [facility] s/p fall at homw with soft tissue hematoma around left eye.PMH: dementia and seizures. CT (-) for acute. Per CM, pt lives with grand daughter and has a caregiver 24/7. Pt oriented to self only. Pt supervision-CGA for all mobility and ADLs secondary to decreased cognition and poor safety awareness. OT recommending home with 24/7 care at d/c. OT will follow at [facility].  -     Row Name 10/12/21 1240          Therapy Assessment/Plan (OT)    Therapy Frequency (OT) 3 times/wk  -     Row Name 10/12/21 [facility]0          Therapy Plan Review/Discharge Plan (OT)    Anticipated Discharge Disposition (OT) home with 24/7 care  -     Row Name 10/12/21 1240          Vital Signs    O2 Delivery Intra Treatment room air  -BL     O2 Delivery Post Treatment room air  -BL     Row Name 10/12/21 1240          Positioning and Restraints    Pre-Treatment Position in bed  -BL     Post Treatment Position bed  -BL     In Bed notified nsg; encouraged to call for assist; exit alarm on; patient within staff view; call light within reach  -BL           User Key  (r) = Recorded By, (t) = Taken By, (c) = Cosigned By    Initials Name Provider Type     Keturah Proctor OT Occupational Therapist               Outcome Measures    No documentation.                 Occupational Therapy Education                 Title: PT OT SLP Therapies (In Progress)     Topic: Occupational Therapy (In Progress)     Point: ADL training (In Progress)     Description:   Instruct learner(s) on proper safety adaptation and remediation techniques during self care  or transfers.   Instruct in proper use of assistive devices.              Learning Progress Summary           Patient SHATNAL Dias, NR by BL at 10/12/2021 1247                   Point: Precautions (In Progress)     Description:   Instruct learner(s) on prescribed precautions during self-care and functional transfers.              Learning Progress Summary           Patient SHANTAL Dias, NR by  at 10/12/2021 1247                               User Key     Initials Effective Dates Name Provider Type Discipline     04/13/21 -  Keturah Proctor OT Occupational Therapist OT              OT Recommendation and Plan  Planned Therapy Interventions (OT): BADL retraining, IADL retraining, cognitive/visual perception retraining, activity tolerance training, ROM/therapeutic exercise, strengthening exercise, passive ROM/stretching, transfer/mobility retraining, patient/caregiver education/training  Therapy Frequency (OT): 3 times/wk  Plan of Care Review  Plan of Care Reviewed With: patient  Outcome Summary: 82 y/o female presenting to [facility] s/p fall at homw with soft tissue hematoma around left eye.PMH: dementia and seizures. CT (-) for acute. Per CM, pt lives with grand daughter and has a caregiver 24/7. Pt oriented to self only. Pt supervision-CGA for all mobility and ADLs secondary to decreased cognition and poor safety awareness. OT recommending home with 24/7 care at d/c. OT will follow at [facility].     Time Calculation:    Time Calculation- OT     Row Name 10/12/21 1248             Time Calculation- OT    OT Start Time 0940  -BL      OT Stop Time 0956  -BL      OT Time Calculation (min) 16 min  -BL      OT Received On 10/12/21  -      OT - Next Appointment 10/14/21  -      OT Goal Re-Cert Due Date 10/26/21  -            User Key  (r) = Recorded By, (t) = Taken By, (c) = Cosigned By    Initials Name Provider Type     Keturah Proctor OT Occupational Therapist              Therapy Charges for Today     Code Description Service  Date Service Provider Modifiers Qty    [id number] HC OT EVAL MOD COMPLEXITY 3 10/12/2021 Marguerite Mccarthy OT GO 1               MARGUERITE MCCARTHY OT  10/12/2021

## 2021-10-12 NOTE — PLAN OF CARE
Problem: Skin Injury Risk Increased  Goal: Skin Health and Integrity  Intervention: Optimize Skin Protection  Recent Flowsheet Documentation  Taken 10/12/2021 0134 by Karlee Pate RN  Head of Bed (HOB): HOB elevated  Taken 10/11/2021 2336 by Karlee Pate RN  Head of Bed (HOB): HOB elevated  Taken 10/11/2021 2137 by Karlee Pate RN  Head of Bed (HOB): HOB elevated  Taken 10/11/2021 1901 by Karlee Pate RN  Pressure Reduction Techniques: frequent weight shift encouraged  Head of Bed (HOB): HOB elevated     Problem: Fall Injury Risk  Goal: Absence of Fall and Fall-Related Injury  Intervention: Identify and Manage Contributors to Fall Injury Risk  Recent Flowsheet Documentation  Taken 10/11/2021 2336 by Karlee Pate RN  Medication Review/Management: medications reviewed  Taken 10/11/2021 2137 by Karlee Pate RN  Medication Review/Management: medications reviewed  Taken 10/11/2021 1901 by Karlee Pate RN  Medication Review/Management: medications reviewed  Intervention: Promote Injury-Free Environment  Recent Flowsheet Documentation  Taken 10/12/2021 0134 by Karlee Pate RN  Safety Promotion/Fall Prevention:   assistive device/personal items within reach   clutter free environment maintained   fall prevention program maintained   lighting adjusted   nonskid shoes/slippers when out of bed   room organization consistent   safety round/check completed  Taken 10/11/2021 2336 by Karlee Pate RN  Safety Promotion/Fall Prevention:   assistive device/personal items within reach   clutter free environment maintained   fall prevention program maintained   lighting adjusted   nonskid shoes/slippers when out of bed   room organization consistent   safety round/check completed  Taken 10/11/2021 2137 by Karlee Pate RN  Safety Promotion/Fall Prevention:   assistive device/personal items within reach   clutter free environment maintained   fall prevention program  maintained   lighting adjusted   nonskid shoes/slippers when out of bed   room organization consistent   safety round/check completed  Taken 10/11/2021 1901 by Karlee Pate RN  Safety Promotion/Fall Prevention:   assistive device/personal items within reach   clutter free environment maintained   fall prevention program maintained   lighting adjusted   nonskid shoes/slippers when out of bed   safety round/check completed     Problem: Adult Inpatient Plan of Care  Goal: Absence of Hospital-Acquired Illness or Injury  Intervention: Identify and Manage Fall Risk  Recent Flowsheet Documentation  Taken 10/12/2021 0134 by Karlee Pate, RN  Safety Promotion/Fall Prevention:   assistive device/personal items within reach   clutter free environment maintained   fall prevention program maintained   lighting adjusted   nonskid shoes/slippers when out of bed   room organization consistent   safety round/check completed  Taken 10/11/2021 2336 by Karlee Pate RN  Safety Promotion/Fall Prevention:   assistive device/personal items within reach   clutter free environment maintained   fall prevention program maintained   lighting adjusted   nonskid shoes/slippers when out of bed   room organization consistent   safety round/check completed  Taken 10/11/2021 2137 by Karlee Pate, RN  Safety Promotion/Fall Prevention:   assistive device/personal items within reach   clutter free environment maintained   fall prevention program maintained   lighting adjusted   nonskid shoes/slippers when out of bed   room organization consistent   safety round/check completed  Taken 10/11/2021 1901 by Karlee Pate, RN  Safety Promotion/Fall Prevention:   assistive device/personal items within reach   clutter free environment maintained   fall prevention program maintained   lighting adjusted   nonskid shoes/slippers when out of bed   safety round/check completed  Intervention: Prevent Skin Injury  Recent Flowsheet  Documentation  Taken 10/12/2021 0134 by Karlee Pate RN  Body Position: position changed independently  Taken 10/11/2021 2336 by Karlee Pate RN  Body Position: position changed independently  Taken 10/11/2021 2137 by Karlee Pate RN  Body Position: position changed independently  Taken 10/11/2021 1901 by Karlee Pate RN  Body Position: position changed independently  Intervention: Prevent Infection  Recent Flowsheet Documentation  Taken 10/12/2021 0134 by Karlee Pate RN  Infection Prevention:   hand hygiene promoted   personal protective equipment utilized  Taken 10/11/2021 2336 by Karlee Pate RN  Infection Prevention:   hand hygiene promoted   personal protective equipment utilized  Taken 10/11/2021 2137 by Karlee Pate RN  Infection Prevention:   hand hygiene promoted   personal protective equipment utilized  Taken 10/11/2021 1901 by Karlee Pate RN  Infection Prevention:   personal protective equipment utilized   rest/sleep promoted  Goal: Optimal Comfort and Wellbeing  Intervention: Provide Person-Centered Care  Recent Flowsheet Documentation  Taken 10/11/2021 1901 by Karlee Pate RN  Trust Relationship/Rapport: care explained     Problem: Asthma Comorbidity  Goal: Maintenance of Asthma Control  Intervention: Maintain Asthma Symptom Control  Recent Flowsheet Documentation  Taken 10/11/2021 2336 by Karlee Pate RN  Medication Review/Management: medications reviewed  Taken 10/11/2021 2137 by Karlee Pate RN  Medication Review/Management: medications reviewed  Taken 10/11/2021 1901 by Karlee Pate RN  Medication Review/Management: medications reviewed     Problem: COPD Comorbidity  Goal: Maintenance of COPD Symptom Control  Intervention: Maintain COPD-Symptom Control  Recent Flowsheet Documentation  Taken 10/11/2021 2336 by Karlee Pate RN  Medication Review/Management: medications reviewed  Taken 10/11/2021 2137 by Karlee Pate  RN  Medication Review/Management: medications reviewed  Taken 10/11/2021 1901 by Karlee Pate RN  Medication Review/Management: medications reviewed     Problem: Heart Failure Comorbidity  Goal: Maintenance of Heart Failure Symptom Control  Intervention: Maintain Heart Failure-Management Strategies  Recent Flowsheet Documentation  Taken 10/11/2021 2336 by Karlee Pate RN  Medication Review/Management: medications reviewed  Taken 10/11/2021 2137 by Karlee Pate RN  Medication Review/Management: medications reviewed  Taken 10/11/2021 1901 by Karlee Pate RN  Medication Review/Management: medications reviewed     Problem: Hypertension Comorbidity  Goal: Blood Pressure in Desired Range  Intervention: Maintain Hypertension-Management Strategies  Recent Flowsheet Documentation  Taken 10/11/2021 2336 by Karlee Pate RN  Medication Review/Management: medications reviewed  Taken 10/11/2021 2137 by Karlee Pate RN  Medication Review/Management: medications reviewed  Taken 10/11/2021 1901 by Karlee Pate RN  Medication Review/Management: medications reviewed     Problem: Pain Chronic (Persistent) (Comorbidity Management)  Goal: Acceptable Pain Control and Functional Ability  Intervention: Manage Persistent Pain  Recent Flowsheet Documentation  Taken 10/11/2021 2336 by Karlee Pate RN  Medication Review/Management: medications reviewed  Taken 10/11/2021 2137 by Karlee Pate RN  Medication Review/Management: medications reviewed  Taken 10/11/2021 1901 by Karlee Pate RN  Medication Review/Management: medications reviewed  Intervention: Optimize Psychosocial Wellbeing  Recent Flowsheet Documentation  Taken 10/11/2021 1901 by Karlee Pate RN  Diversional Activities: television  Family/Support System Care: support provided     Problem: Seizure Disorder Comorbidity  Goal: Maintenance of Seizure Control  Intervention: Maintain Seizure-Symptom Control  Recent Flowsheet  Documentation  Taken 10/12/2021 0134 by Karlee Pate, RN  Seizure Precautions:   activity supervised   clutter-free environment maintained   side rails padded  Taken 10/11/2021 1901 by Karlee Pate, RN  Seizure Precautions:   activity supervised   clutter-free environment maintained   side rails padded   soft boundaries provided   Goal Outcome Evaluation:         Pt with continued confusion. Pt continues to get out of the bed, setting off the bed alarm. Bed is unsteady and remains a falls risk. Pt has been pleasant this shift and is resting in bed with eyes closed at this time. Will continue to monitor.

## 2021-10-13 ENCOUNTER — READMISSION MANAGEMENT (OUTPATIENT)
Dept: CALL CENTER | Facility: HOSPITAL | Age: 81
End: 2021-10-13

## 2021-10-13 NOTE — OUTREACH NOTE
Prep Survey      Responses   [facility] facility patient discharged from? [facility]   Is LACE score < 7 ? No   Emergency Room discharge w/ pulse ox? No   Eligibility Readm Mgmt   Discharge diagnosis Fall, Seizure Disorder   Does the patient have one of the following disease processes/diagnoses(primary or secondary)? Other   Does the patient have Home health ordered? Yes   What is the Home health agency?  [facility]    Is there a DME ordered? No   Prep survey completed? Yes          Marina Son RN

## 2021-10-19 ENCOUNTER — READMISSION MANAGEMENT (OUTPATIENT)
Dept: CALL CENTER | Facility: HOSPITAL | Age: 81
End: 2021-10-19

## 2021-10-19 NOTE — OUTREACH NOTE
Medical Week 1 Survey      Responses   [facility] patient discharged from? [facility]   Does the patient have one of the following disease processes/diagnoses(primary or secondary)? Other   Week 1 attempt successful? Yes   Call start time 1017   Call end time 1020   Discharge diagnosis Fall, Seizure Disorder   Person spoke with today (if not patient) and relationship Granddaughter/Angelica   Does the patient have all medications ordered at discharge? N/A   Is the patient taking all medications as directed (includes completed medication regime)? Yes   Does the patient have a primary care provider?  Yes   Does the patient have an appointment with their PCP within 7 days of discharge? Yes   Has the patient kept scheduled appointments due by today? Yes   Comments States she has [facility] that come to the home to see her.   What is the Home health agency?  [facility]    Has home health visited the patient within 72 hours of discharge? Yes   Home health comments States she has a nurse from [facility] 24/7 and has therapy coming now.   Psychosocial issues? No   Did the patient receive a copy of their discharge instructions? Yes   Nursing interventions Reviewed instructions with patient   What is the patient's perception of their health status since discharge? Improving   Is the patient/caregiver able to teach back signs and symptoms related to disease process for when to call PCP? Yes   Is the patient/caregiver able to teach back signs and symptoms related to disease process for when to call 911? Yes   Is the patient/caregiver able to teach back the hierarchy of who to call/visit for symptoms/problems? PCP, Specialist, Home health nurse, Urgent Care, ED, 911 Yes   If the patient is a current smoker, are they able to teach back resources for cessation? Not a smoker   Additional teach back comments States the swelling has decrease and she is doing well.   Week 1 call completed? Yes   Wrap up additional  comments Denies questions or needs at this time          Violeta Abarca, CHANTELLN

## 2021-10-26 ENCOUNTER — READMISSION MANAGEMENT (OUTPATIENT)
Dept: CALL CENTER | Facility: HOSPITAL | Age: 81
End: 2021-10-26

## 2021-10-26 NOTE — OUTREACH NOTE
Medical Week 2 Survey      Responses   [facility] patient discharged from? [facility]   Does the patient have one of the following disease processes/diagnoses(primary or secondary)? Other   Week 2 attempt successful? Yes   Call start time 1021   Discharge diagnosis Fall, Seizure Disorder   Call end time 1023   Person spoke with today (if not patient) and relationship Granddaughter/Angelica   Is the patient taking all medications as directed (includes completed medication regime)? Yes   Does the patient have a primary care provider?  Yes   Has the patient kept scheduled appointments due by today? Yes   Comments States she has [facility] that come to the home to see her.   What is the Home health agency?  [facility]     Has home health visited the patient within 72 hours of discharge? Yes   Psychosocial issues? No   What is the patient's perception of their health status since discharge? Improving   Is the patient/caregiver able to teach back signs and symptoms related to disease process for when to call PCP? Yes   Is the patient/caregiver able to teach back signs and symptoms related to disease process for when to call 911? Yes   Is the patient/caregiver able to teach back the hierarchy of who to call/visit for symptoms/problems? PCP, Specialist, Home health nurse, Urgent Care, ED, 911 Yes   Additional teach back comments States she is doing well and very appreciative of follow up calls.   Week 2 Call Completed? Yes   Wrap up additional comments Denies questions or needs at this time          Violeta Abarca LPN

## 2021-11-02 ENCOUNTER — READMISSION MANAGEMENT (OUTPATIENT)
Dept: CALL CENTER | Facility: HOSPITAL | Age: 81
End: 2021-11-02

## 2021-11-02 NOTE — OUTREACH NOTE
Medical Week 3 Survey      Responses   [facility] patient discharged from? [facility]   Does the patient have one of the following disease processes/diagnoses(primary or secondary)? Other   Week 3 attempt successful? No   Unsuccessful attempts Attempt 1          Violeta Abarca LPN

## 2021-11-04 ENCOUNTER — READMISSION MANAGEMENT (OUTPATIENT)
Dept: CALL CENTER | Facility: HOSPITAL | Age: 81
End: 2021-11-04

## 2021-11-04 NOTE — OUTREACH NOTE
Medical Week 3 Survey      Responses   [facility] patient discharged from? [facility]   Does the patient have one of the following disease processes/diagnoses(primary or secondary)? Other   Week 3 attempt successful? No   Unsuccessful attempts Attempt 2          Radha Rivas RN

## 2022-01-27 ENCOUNTER — HOSPITAL ENCOUNTER (INPATIENT)
Facility: HOSPITAL | Age: 82
LOS: 4 days | Discharge: HOME HEALTH | End: 2022-01-31
Attending: HOSPITALIST | Admitting: ORTHOPAEDIC SURGERY

## 2022-01-27 ENCOUNTER — APPOINTMENT (OUTPATIENT)
Dept: GENERAL RADIOLOGY | Facility: HOSPITAL | Age: 82
End: 2022-01-27

## 2022-01-27 ENCOUNTER — APPOINTMENT (OUTPATIENT)
Dept: CT IMAGING | Facility: HOSPITAL | Age: 82
End: 2022-01-27

## 2022-01-27 DIAGNOSIS — S52.532A CLOSED COLLES' FRACTURE OF LEFT RADIUS, INITIAL ENCOUNTER: Primary | ICD-10-CM

## 2022-01-27 DIAGNOSIS — S42.202A CLOSED FRACTURE OF PROXIMAL END OF LEFT HUMERUS, UNSPECIFIED FRACTURE MORPHOLOGY, INITIAL ENCOUNTER: ICD-10-CM
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
## 2022-01-27 LAB
ANION GAP SERPL CALCULATED.3IONS-SCNC: 11 MMOL/L (ref 5–15)
B PARAPERT DNA SPEC QL NAA+PROBE: NOT DETECTED
B PERT DNA SPEC QL NAA+PROBE: NOT DETECTED
BACTERIA UR QL AUTO: ABNORMAL /HPF
BASOPHILS # BLD AUTO: 0 10*3/MM3 (ref 0–0.2)
BASOPHILS NFR BLD AUTO: 0.2 % (ref 0–1.5)
BILIRUB UR QL STRIP: NEGATIVE
BUN SERPL-MCNC: 31 MG/DL (ref 8–23)
BUN/CREAT SERPL: 21.4 (ref 7–25)
C PNEUM DNA NPH QL NAA+NON-PROBE: NOT DETECTED
CALCIUM SPEC-SCNC: 8.3 MG/DL (ref 8.6–10.5)
CARBAMAZEPINE SERPL-MCNC: 7.6 MCG/ML (ref 4–12)
CHLORIDE SERPL-SCNC: 108 MMOL/L (ref 98–107)
CLARITY UR: CLEAR
CO2 SERPL-SCNC: 19 MMOL/L (ref 22–29)
COLOR UR: YELLOW
CREAT SERPL-MCNC: 1.45 MG/DL (ref 0.57–1)
DEPRECATED RDW RBC AUTO: 45.1 FL (ref 37–54)
EOSINOPHIL # BLD AUTO: 0 10*3/MM3 (ref 0–0.4)
EOSINOPHIL NFR BLD AUTO: 0.1 % (ref 0.3–6.2)
ERYTHROCYTE [DISTWIDTH] IN BLOOD BY AUTOMATED COUNT: 13.3 % (ref 12.3–15.4)
FLUAV H1 2009 PAND RNA NPH QL NAA+PROBE: NOT DETECTED
FLUAV H1 HA GENE NPH QL NAA+PROBE: NOT DETECTED
FLUAV H3 RNA NPH QL NAA+PROBE: NOT DETECTED
FLUAV SUBTYP SPEC NAA+PROBE: NOT DETECTED
FLUBV RNA ISLT QL NAA+PROBE: NOT DETECTED
GFR SERPL CREATININE-BSD FRML MDRD: 35 ML/MIN/1.73
GLUCOSE SERPL-MCNC: 136 MG/DL (ref 65–99)
GLUCOSE UR STRIP-MCNC: ABNORMAL MG/DL
HADV DNA SPEC NAA+PROBE: NOT DETECTED
HCOV 229E RNA SPEC QL NAA+PROBE: NOT DETECTED
HCOV HKU1 RNA SPEC QL NAA+PROBE: NOT DETECTED
HCOV NL63 RNA SPEC QL NAA+PROBE: NOT DETECTED
HCOV OC43 RNA SPEC QL NAA+PROBE: NOT DETECTED
HCT VFR BLD AUTO: 25.5 % (ref 34–46.6)
HGB BLD-MCNC: 8.8 G/DL (ref 12–15.9)
HGB UR QL STRIP.AUTO: NEGATIVE
HMPV RNA NPH QL NAA+NON-PROBE: NOT DETECTED
HPIV1 RNA ISLT QL NAA+PROBE: NOT DETECTED
HPIV2 RNA SPEC QL NAA+PROBE: NOT DETECTED
HPIV3 RNA NPH QL NAA+PROBE: NOT DETECTED
HPIV4 P GENE NPH QL NAA+PROBE: NOT DETECTED
HYALINE CASTS UR QL AUTO: ABNORMAL /LPF
IRON 24H UR-MRATE: 36 MCG/DL (ref 37–145)
IRON SATN MFR SERPL: 15 % (ref 20–50)
KETONES UR QL STRIP: NEGATIVE
LEUKOCYTE ESTERASE UR QL STRIP.AUTO: ABNORMAL
LYMPHOCYTES # BLD AUTO: 0.4 10*3/MM3 (ref 0.7–3.1)
LYMPHOCYTES NFR BLD AUTO: 6.9 % (ref 19.6–45.3)
M PNEUMO IGG SER IA-ACNC: NOT DETECTED
MCH RBC QN AUTO: 32.8 PG (ref 26.6–33)
MCHC RBC AUTO-ENTMCNC: 34.4 G/DL (ref 31.5–35.7)
MCV RBC AUTO: 95.3 FL (ref 79–97)
MONOCYTES # BLD AUTO: 0.4 10*3/MM3 (ref 0.1–0.9)
MONOCYTES NFR BLD AUTO: 6.5 % (ref 5–12)
NEUTROPHILS NFR BLD AUTO: 4.9 10*3/MM3 (ref 1.7–7)
NEUTROPHILS NFR BLD AUTO: 86.3 % (ref 42.7–76)
NITRITE UR QL STRIP: NEGATIVE
NRBC BLD AUTO-RTO: 0 /100 WBC (ref 0–0.2)
PH UR STRIP.AUTO: 6 [PH] (ref 5–8)
PLATELET # BLD AUTO: 238 10*3/MM3 (ref 140–450)
PMV BLD AUTO: 7.6 FL (ref 6–12)
POTASSIUM SERPL-SCNC: 4.5 MMOL/L (ref 3.5–5.2)
PROT UR QL STRIP: ABNORMAL
RBC # BLD AUTO: 2.67 10*6/MM3 (ref 3.77–5.28)
RBC # UR STRIP: ABNORMAL /HPF
REF LAB TEST METHOD: ABNORMAL
RHINOVIRUS RNA SPEC NAA+PROBE: NOT DETECTED
RSV RNA NPH QL NAA+NON-PROBE: NOT DETECTED
SARS-COV-2 RNA NPH QL NAA+NON-PROBE: DETECTED
SODIUM SERPL-SCNC: 138 MMOL/L (ref 136–145)
SP GR UR STRIP: 1.02 (ref 1–1.03)
SQUAMOUS #/AREA URNS HPF: ABNORMAL /HPF
TIBC SERPL-MCNC: 240 MCG/DL (ref 298–536)
TRANSFERRIN SERPL-MCNC: 161 MG/DL (ref 200–360)
UROBILINOGEN UR QL STRIP: ABNORMAL
WBC # UR STRIP: ABNORMAL /HPF
WBC NRBC COR # BLD: 5.7 10*3/MM3 (ref 3.4–10.8)

## 2022-01-27 PROCEDURE — 81001 URINALYSIS AUTO W/SCOPE: CPT | Performed by: NURSE PRACTITIONER

## 2022-01-27 PROCEDURE — 85025 COMPLETE CBC W/AUTO DIFF WBC: CPT | Performed by: NURSE PRACTITIONER

## 2022-01-27 PROCEDURE — 70450 CT HEAD/BRAIN W/O DYE: CPT

## 2022-01-27 PROCEDURE — P9612 CATHETERIZE FOR URINE SPEC: HCPCS

## 2022-01-27 PROCEDURE — 84466 ASSAY OF TRANSFERRIN: CPT | Performed by: INTERNAL MEDICINE

## 2022-01-27 PROCEDURE — 73090 X-RAY EXAM OF FOREARM: CPT

## 2022-01-27 PROCEDURE — 73060 X-RAY EXAM OF HUMERUS: CPT

## 2022-01-27 PROCEDURE — 83540 ASSAY OF IRON: CPT | Performed by: INTERNAL MEDICINE

## 2022-01-27 PROCEDURE — 80048 BASIC METABOLIC PNL TOTAL CA: CPT | Performed by: NURSE PRACTITIONER

## 2022-01-27 PROCEDURE — 73110 X-RAY EXAM OF WRIST: CPT

## 2022-01-27 PROCEDURE — 99221 1ST HOSP IP/OBS SF/LOW 40: CPT | Performed by: INTERNAL MEDICINE

## 2022-01-27 PROCEDURE — 80156 ASSAY CARBAMAZEPINE TOTAL: CPT | Performed by: NURSE PRACTITIONER

## 2022-01-27 PROCEDURE — 71045 X-RAY EXAM CHEST 1 VIEW: CPT

## 2022-01-27 PROCEDURE — 0202U NFCT DS 22 TRGT SARS-COV-2: CPT | Performed by: NURSE PRACTITIONER

## 2022-01-27 PROCEDURE — 99284 EMERGENCY DEPT VISIT MOD MDM: CPT

## 2022-01-27 PROCEDURE — 25010000002 ZOLEDRONIC ACID 4 MG/100ML SOLUTION: Performed by: INTERNAL MEDICINE

## 2022-01-27 RX ORDER — ZOLEDRONIC ACID 0.04 MG/ML
4 INJECTION, SOLUTION INTRAVENOUS ONCE
Status: COMPLETED | OUTPATIENT
Start: 2022-01-27 | End: 2022-01-27

## 2022-01-27 RX ORDER — HYDROCODONE BITARTRATE AND ACETAMINOPHEN 5; 325 MG/1; MG/1
1 TABLET ORAL EVERY 4 HOURS PRN
Status: DISCONTINUED | OUTPATIENT
Start: 2022-01-27 | End: 2022-01-28

## 2022-01-27 RX ORDER — SODIUM CHLORIDE 0.9 % (FLUSH) 0.9 %
10 SYRINGE (ML) INJECTION AS NEEDED
Status: DISCONTINUED | OUTPATIENT
Start: 2022-01-27 | End: 2022-01-28

## 2022-01-27 RX ORDER — ONDANSETRON 2 MG/ML
4 INJECTION INTRAMUSCULAR; INTRAVENOUS EVERY 6 HOURS PRN
Status: DISCONTINUED | OUTPATIENT
Start: 2022-01-27 | End: 2022-01-28

## 2022-01-27 RX ORDER — ACETAMINOPHEN 325 MG/1
650 TABLET ORAL EVERY 4 HOURS PRN
Status: DISCONTINUED | OUTPATIENT
Start: 2022-01-27 | End: 2022-01-28

## 2022-01-27 RX ORDER — SODIUM CHLORIDE 0.9 % (FLUSH) 0.9 %
10 SYRINGE (ML) INJECTION EVERY 12 HOURS SCHEDULED
Status: DISCONTINUED | OUTPATIENT
Start: 2022-01-27 | End: 2022-01-28

## 2022-01-27 RX ORDER — HYDROCODONE BITARTRATE AND ACETAMINOPHEN 5; 325 MG/1; MG/1
1 TABLET ORAL ONCE AS NEEDED
Status: COMPLETED | OUTPATIENT
Start: 2022-01-27 | End: 2022-01-27

## 2022-01-27 RX ADMIN — ZOLEDRONIC ACID 4 MG: 0.04 INJECTION, SOLUTION INTRAVENOUS at 20:52

## 2022-01-27 RX ADMIN — HYDROCODONE BITARTRATE AND ACETAMINOPHEN 1 TABLET: 5; 325 TABLET ORAL at 21:08

## 2022-01-28 ENCOUNTER — APPOINTMENT (OUTPATIENT)
Dept: GENERAL RADIOLOGY | Facility: HOSPITAL | Age: 82
End: 2022-01-28

## 2022-01-28 ENCOUNTER — ANESTHESIA (OUTPATIENT)
Dept: PERIOP | Facility: HOSPITAL | Age: 82
End: 2022-01-28

## 2022-01-28 ENCOUNTER — ANESTHESIA EVENT (OUTPATIENT)
Dept: PERIOP | Facility: HOSPITAL | Age: 82
End: 2022-01-28

## 2022-01-28 LAB
ABO GROUP BLD: NORMAL
ANION GAP SERPL CALCULATED.3IONS-SCNC: 13 MMOL/L (ref 5–15)
BLD GP AB SCN SERPL QL: NEGATIVE
BUN SERPL-MCNC: 35 MG/DL (ref 8–23)
BUN/CREAT SERPL: 21.2 (ref 7–25)
CALCIUM SPEC-SCNC: 7.9 MG/DL (ref 8.6–10.5)
CHLORIDE SERPL-SCNC: 107 MMOL/L (ref 98–107)
CO2 SERPL-SCNC: 16 MMOL/L (ref 22–29)
CREAT SERPL-MCNC: 1.65 MG/DL (ref 0.57–1)
DEPRECATED RDW RBC AUTO: 42.4 FL (ref 37–54)
ERYTHROCYTE [DISTWIDTH] IN BLOOD BY AUTOMATED COUNT: 12.7 % (ref 12.3–15.4)
GFR SERPL CREATININE-BSD FRML MDRD: 30 ML/MIN/1.73
GLUCOSE SERPL-MCNC: 91 MG/DL (ref 65–99)
HCT VFR BLD AUTO: 22.1 % (ref 34–46.6)
HCT VFR BLD AUTO: 22.6 % (ref 34–46.6)
HCT VFR BLD AUTO: 28.1 % (ref 34–46.6)
HGB BLD-MCNC: 7.5 G/DL (ref 12–15.9)
HGB BLD-MCNC: 7.7 G/DL (ref 12–15.9)
HGB BLD-MCNC: 9.5 G/DL (ref 12–15.9)
HOLD SPECIMEN: NORMAL
INR PPP: 0.95 (ref 0.93–1.1)
MCH RBC QN AUTO: 32.3 PG (ref 26.6–33)
MCHC RBC AUTO-ENTMCNC: 34 G/DL (ref 31.5–35.7)
MCV RBC AUTO: 95.1 FL (ref 79–97)
PLATELET # BLD AUTO: 207 10*3/MM3 (ref 140–450)
PMV BLD AUTO: 8 FL (ref 6–12)
POTASSIUM SERPL-SCNC: 4.7 MMOL/L (ref 3.5–5.2)
PROTHROMBIN TIME: 10.6 SECONDS (ref 9.6–11.7)
RBC # BLD AUTO: 2.32 10*6/MM3 (ref 3.77–5.28)
RH BLD: NEGATIVE
SODIUM SERPL-SCNC: 136 MMOL/L (ref 136–145)
T&S EXPIRATION DATE: NORMAL
WBC NRBC COR # BLD: 4.6 10*3/MM3 (ref 3.4–10.8)

## 2022-01-28 PROCEDURE — 85014 HEMATOCRIT: CPT | Performed by: INTERNAL MEDICINE

## 2022-01-28 PROCEDURE — C1713 ANCHOR/SCREW BN/BN,TIS/BN: HCPCS | Performed by: ORTHOPAEDIC SURGERY

## 2022-01-28 PROCEDURE — 85018 HEMOGLOBIN: CPT | Performed by: INTERNAL MEDICINE

## 2022-01-28 PROCEDURE — 86901 BLOOD TYPING SEROLOGIC RH(D): CPT

## 2022-01-28 PROCEDURE — 93005 ELECTROCARDIOGRAM TRACING: CPT | Performed by: ORTHOPAEDIC SURGERY

## 2022-01-28 PROCEDURE — 99232 SBSQ HOSP IP/OBS MODERATE 35: CPT | Performed by: INTERNAL MEDICINE

## 2022-01-28 PROCEDURE — 25010000002 FENTANYL CITRATE (PF) 100 MCG/2ML SOLUTION: Performed by: ANESTHESIOLOGY

## 2022-01-28 PROCEDURE — 73100 X-RAY EXAM OF WRIST: CPT

## 2022-01-28 PROCEDURE — 25010000002 ONDANSETRON PER 1 MG: Performed by: ANESTHESIOLOGY

## 2022-01-28 PROCEDURE — 73060 X-RAY EXAM OF HUMERUS: CPT

## 2022-01-28 PROCEDURE — 94640 AIRWAY INHALATION TREATMENT: CPT

## 2022-01-28 PROCEDURE — 0PSJ04Z REPOSITION LEFT RADIUS WITH INTERNAL FIXATION DEVICE, OPEN APPROACH: ICD-10-PCS | Performed by: ORTHOPAEDIC SURGERY

## 2022-01-28 PROCEDURE — 94799 UNLISTED PULMONARY SVC/PX: CPT

## 2022-01-28 PROCEDURE — 25010000002 PROPOFOL 10 MG/ML EMULSION: Performed by: ANESTHESIOLOGY

## 2022-01-28 PROCEDURE — P9016 RBC LEUKOCYTES REDUCED: HCPCS

## 2022-01-28 PROCEDURE — 0PSD04Z REPOSITION LEFT HUMERAL HEAD WITH INTERNAL FIXATION DEVICE, OPEN APPROACH: ICD-10-PCS | Performed by: ORTHOPAEDIC SURGERY

## 2022-01-28 PROCEDURE — 36430 TRANSFUSION BLD/BLD COMPNT: CPT

## 2022-01-28 PROCEDURE — 86900 BLOOD TYPING SEROLOGIC ABO: CPT

## 2022-01-28 PROCEDURE — 86901 BLOOD TYPING SEROLOGIC RH(D): CPT | Performed by: INTERNAL MEDICINE

## 2022-01-28 PROCEDURE — 0 CEFAZOLIN PER 500 MG: Performed by: ANESTHESIOLOGY

## 2022-01-28 PROCEDURE — 80048 BASIC METABOLIC PNL TOTAL CA: CPT | Performed by: INTERNAL MEDICINE

## 2022-01-28 PROCEDURE — 86923 COMPATIBILITY TEST ELECTRIC: CPT

## 2022-01-28 PROCEDURE — 85027 COMPLETE CBC AUTOMATED: CPT | Performed by: INTERNAL MEDICINE

## 2022-01-28 PROCEDURE — 85610 PROTHROMBIN TIME: CPT | Performed by: ORTHOPAEDIC SURGERY

## 2022-01-28 PROCEDURE — 86850 RBC ANTIBODY SCREEN: CPT | Performed by: INTERNAL MEDICINE

## 2022-01-28 PROCEDURE — 25010000002 DEXAMETHASONE PER 1 MG: Performed by: ANESTHESIOLOGY

## 2022-01-28 PROCEDURE — 76000 FLUOROSCOPY <1 HR PHYS/QHP: CPT

## 2022-01-28 PROCEDURE — 93010 ELECTROCARDIOGRAM REPORT: CPT | Performed by: INTERNAL MEDICINE

## 2022-01-28 PROCEDURE — 86900 BLOOD TYPING SEROLOGIC ABO: CPT | Performed by: INTERNAL MEDICINE

## 2022-01-28 DEVICE — LOCKING SCREW, T7
Type: IMPLANTABLE DEVICE | Site: RADIUS | Status: FUNCTIONAL
Brand: VARIAX

## 2022-01-28 DEVICE — CORTEX SCREW
Type: IMPLANTABLE DEVICE | Site: HUMERUS | Status: FUNCTIONAL
Brand: AXSOS

## 2022-01-28 DEVICE — BONE SCREW, T7
Type: IMPLANTABLE DEVICE | Site: RADIUS | Status: FUNCTIONAL
Brand: VARIAX

## 2022-01-28 DEVICE — LOCKING SCREW
Type: IMPLANTABLE DEVICE | Site: HUMERUS | Status: FUNCTIONAL
Brand: AXSOS

## 2022-01-28 DEVICE — PROXIMAL LATERAL HUMERUS PLATE, LEFT
Type: IMPLANTABLE DEVICE | Site: HUMERUS | Status: FUNCTIONAL
Brand: AXSOS

## 2022-01-28 DEVICE — VOLAR SMARTLOCK DR PLATE,NARR.LEFT,SHORT
Type: IMPLANTABLE DEVICE | Site: HUMERUS | Status: FUNCTIONAL
Brand: VARIAX

## 2022-01-28 DEVICE — CANCELLOUS SCREW
Type: IMPLANTABLE DEVICE | Site: HUMERUS | Status: FUNCTIONAL
Brand: AXSOS

## 2022-01-28 RX ORDER — SODIUM CHLORIDE 9 MG/ML
INJECTION, SOLUTION INTRAVENOUS CONTINUOUS PRN
Status: DISCONTINUED | OUTPATIENT
Start: 2022-01-28 | End: 2022-01-28 | Stop reason: SURG

## 2022-01-28 RX ORDER — PROMETHAZINE HYDROCHLORIDE 25 MG/1
25 TABLET ORAL ONCE AS NEEDED
Status: DISCONTINUED | OUTPATIENT
Start: 2022-01-28 | End: 2022-01-28 | Stop reason: HOSPADM

## 2022-01-28 RX ORDER — ONDANSETRON 2 MG/ML
4 INJECTION INTRAMUSCULAR; INTRAVENOUS EVERY 6 HOURS PRN
Status: DISCONTINUED | OUTPATIENT
Start: 2022-01-28 | End: 2022-01-31 | Stop reason: HOSPADM

## 2022-01-28 RX ORDER — BUDESONIDE AND FORMOTEROL FUMARATE DIHYDRATE 80; 4.5 UG/1; UG/1
2 AEROSOL RESPIRATORY (INHALATION)
Status: DISCONTINUED | OUTPATIENT
Start: 2022-01-28 | End: 2022-01-31 | Stop reason: HOSPADM

## 2022-01-28 RX ORDER — DONEPEZIL HYDROCHLORIDE 5 MG/1
5 TABLET, FILM COATED ORAL NIGHTLY
Status: DISCONTINUED | OUTPATIENT
Start: 2022-01-28 | End: 2022-01-31 | Stop reason: HOSPADM

## 2022-01-28 RX ORDER — DIPHENHYDRAMINE HCL 25 MG
25 CAPSULE ORAL EVERY 6 HOURS PRN
Status: DISCONTINUED | OUTPATIENT
Start: 2022-01-28 | End: 2022-01-31 | Stop reason: HOSPADM

## 2022-01-28 RX ORDER — HYDROCODONE BITARTRATE AND ACETAMINOPHEN 5; 325 MG/1; MG/1
1 TABLET ORAL EVERY 12 HOURS PRN
Status: DISCONTINUED | OUTPATIENT
Start: 2022-01-28 | End: 2022-01-28 | Stop reason: SDUPTHER

## 2022-01-28 RX ORDER — DEXAMETHASONE SODIUM PHOSPHATE 4 MG/ML
INJECTION, SOLUTION INTRA-ARTICULAR; INTRALESIONAL; INTRAMUSCULAR; INTRAVENOUS; SOFT TISSUE AS NEEDED
Status: DISCONTINUED | OUTPATIENT
Start: 2022-01-28 | End: 2022-01-28 | Stop reason: SURG

## 2022-01-28 RX ORDER — ATENOLOL 50 MG/1
50 TABLET ORAL EVERY MORNING
Status: DISCONTINUED | OUTPATIENT
Start: 2022-01-28 | End: 2022-01-31 | Stop reason: HOSPADM

## 2022-01-28 RX ORDER — SODIUM CHLORIDE, SODIUM LACTATE, POTASSIUM CHLORIDE, CALCIUM CHLORIDE 600; 310; 30; 20 MG/100ML; MG/100ML; MG/100ML; MG/100ML
INJECTION, SOLUTION INTRAVENOUS CONTINUOUS PRN
Status: DISCONTINUED | OUTPATIENT
Start: 2022-01-28 | End: 2022-01-28 | Stop reason: SURG

## 2022-01-28 RX ORDER — SODIUM CHLORIDE 9 MG/ML
INJECTION, SOLUTION INTRAVENOUS CONTINUOUS PRN
Status: DISCONTINUED | OUTPATIENT
Start: 2022-01-28 | End: 2022-01-28

## 2022-01-28 RX ORDER — MEPERIDINE HYDROCHLORIDE 25 MG/ML
12.5 INJECTION INTRAMUSCULAR; INTRAVENOUS; SUBCUTANEOUS
Status: DISCONTINUED | OUTPATIENT
Start: 2022-01-28 | End: 2022-01-28 | Stop reason: HOSPADM

## 2022-01-28 RX ORDER — HYDROMORPHONE HCL 110MG/55ML
0.2 PATIENT CONTROLLED ANALGESIA SYRINGE INTRAVENOUS
Status: DISCONTINUED | OUTPATIENT
Start: 2022-01-28 | End: 2022-01-28 | Stop reason: HOSPADM

## 2022-01-28 RX ORDER — HYDROMORPHONE HCL 110MG/55ML
0.25 PATIENT CONTROLLED ANALGESIA SYRINGE INTRAVENOUS
Status: DISCONTINUED | OUTPATIENT
Start: 2022-01-28 | End: 2022-01-28 | Stop reason: HOSPADM

## 2022-01-28 RX ORDER — NALOXONE HCL 0.4 MG/ML
0.4 VIAL (ML) INJECTION
Status: DISCONTINUED | OUTPATIENT
Start: 2022-01-28 | End: 2022-01-31 | Stop reason: HOSPADM

## 2022-01-28 RX ORDER — CEFAZOLIN SODIUM 1 G/3ML
INJECTION, POWDER, FOR SOLUTION INTRAMUSCULAR; INTRAVENOUS AS NEEDED
Status: DISCONTINUED | OUTPATIENT
Start: 2022-01-28 | End: 2022-01-28 | Stop reason: SURG

## 2022-01-28 RX ORDER — ONDANSETRON 4 MG/1
4 TABLET, FILM COATED ORAL EVERY 6 HOURS PRN
Status: DISCONTINUED | OUTPATIENT
Start: 2022-01-28 | End: 2022-01-31 | Stop reason: HOSPADM

## 2022-01-28 RX ORDER — ALBUTEROL SULFATE 2.5 MG/3ML
2.5 SOLUTION RESPIRATORY (INHALATION) EVERY 6 HOURS PRN
Status: DISCONTINUED | OUTPATIENT
Start: 2022-01-28 | End: 2022-01-28

## 2022-01-28 RX ORDER — ALBUTEROL SULFATE 2.5 MG/3ML
2.5 SOLUTION RESPIRATORY (INHALATION) EVERY 6 HOURS PRN
Status: DISCONTINUED | OUTPATIENT
Start: 2022-01-28 | End: 2022-01-31 | Stop reason: HOSPADM

## 2022-01-28 RX ORDER — DIAZEPAM 5 MG/1
5 TABLET ORAL EVERY 6 HOURS PRN
Status: DISCONTINUED | OUTPATIENT
Start: 2022-01-28 | End: 2022-01-31 | Stop reason: HOSPADM

## 2022-01-28 RX ORDER — ROCURONIUM BROMIDE 10 MG/ML
INJECTION, SOLUTION INTRAVENOUS AS NEEDED
Status: DISCONTINUED | OUTPATIENT
Start: 2022-01-28 | End: 2022-01-28 | Stop reason: SURG

## 2022-01-28 RX ORDER — AMLODIPINE BESYLATE 5 MG/1
10 TABLET ORAL EVERY EVENING
Status: DISCONTINUED | OUTPATIENT
Start: 2022-01-28 | End: 2022-01-31 | Stop reason: HOSPADM

## 2022-01-28 RX ORDER — CARBAMAZEPINE 100 MG/5ML
150 SUSPENSION ORAL 2 TIMES DAILY
Status: DISCONTINUED | OUTPATIENT
Start: 2022-01-28 | End: 2022-01-31 | Stop reason: HOSPADM

## 2022-01-28 RX ORDER — PROPOFOL 10 MG/ML
VIAL (ML) INTRAVENOUS AS NEEDED
Status: DISCONTINUED | OUTPATIENT
Start: 2022-01-28 | End: 2022-01-28 | Stop reason: SURG

## 2022-01-28 RX ORDER — ONDANSETRON 2 MG/ML
4 INJECTION INTRAMUSCULAR; INTRAVENOUS ONCE AS NEEDED
Status: COMPLETED | OUTPATIENT
Start: 2022-01-28 | End: 2022-01-28

## 2022-01-28 RX ORDER — NICARDIPINE HYDROCHLORIDE 2.5 MG/ML
INJECTION INTRAVENOUS AS NEEDED
Status: DISCONTINUED | OUTPATIENT
Start: 2022-01-28 | End: 2022-01-28 | Stop reason: SURG

## 2022-01-28 RX ORDER — ACETAMINOPHEN 650 MG/1
650 SUPPOSITORY RECTAL EVERY 4 HOURS PRN
Status: DISCONTINUED | OUTPATIENT
Start: 2022-01-28 | End: 2022-01-31 | Stop reason: HOSPADM

## 2022-01-28 RX ORDER — FENTANYL CITRATE 50 UG/ML
INJECTION, SOLUTION INTRAMUSCULAR; INTRAVENOUS AS NEEDED
Status: DISCONTINUED | OUTPATIENT
Start: 2022-01-28 | End: 2022-01-28 | Stop reason: SURG

## 2022-01-28 RX ORDER — METHYLPREDNISOLONE 4 MG/1
TABLET ORAL SEE ADMIN INSTRUCTIONS
COMMUNITY

## 2022-01-28 RX ORDER — ERGOCALCIFEROL 1.25 MG/1
50000 CAPSULE ORAL WEEKLY
Status: DISCONTINUED | OUTPATIENT
Start: 2022-01-28 | End: 2022-01-31 | Stop reason: HOSPADM

## 2022-01-28 RX ORDER — ACETAMINOPHEN 325 MG/1
650 TABLET ORAL EVERY 4 HOURS PRN
Status: DISCONTINUED | OUTPATIENT
Start: 2022-01-28 | End: 2022-01-31 | Stop reason: HOSPADM

## 2022-01-28 RX ORDER — LIDOCAINE HYDROCHLORIDE 10 MG/ML
INJECTION, SOLUTION EPIDURAL; INFILTRATION; INTRACAUDAL; PERINEURAL AS NEEDED
Status: DISCONTINUED | OUTPATIENT
Start: 2022-01-28 | End: 2022-01-28 | Stop reason: SURG

## 2022-01-28 RX ORDER — OXYCODONE HYDROCHLORIDE 5 MG/1
10 TABLET ORAL EVERY 4 HOURS PRN
Status: DISCONTINUED | OUTPATIENT
Start: 2022-01-28 | End: 2022-01-31 | Stop reason: HOSPADM

## 2022-01-28 RX ORDER — PROMETHAZINE HYDROCHLORIDE 25 MG/1
25 SUPPOSITORY RECTAL ONCE AS NEEDED
Status: DISCONTINUED | OUTPATIENT
Start: 2022-01-28 | End: 2022-01-28 | Stop reason: HOSPADM

## 2022-01-28 RX ORDER — HYDROCODONE BITARTRATE AND ACETAMINOPHEN 5; 325 MG/1; MG/1
1 TABLET ORAL EVERY 4 HOURS PRN
Status: DISCONTINUED | OUTPATIENT
Start: 2022-01-28 | End: 2022-01-31 | Stop reason: HOSPADM

## 2022-01-28 RX ADMIN — FENTANYL CITRATE 25 MCG: 50 INJECTION, SOLUTION INTRAMUSCULAR; INTRAVENOUS at 15:30

## 2022-01-28 RX ADMIN — PROPOFOL 50 MCG/KG/MIN: 10 INJECTION, EMULSION INTRAVENOUS at 15:11

## 2022-01-28 RX ADMIN — NICARDIPINE HYDROCHLORIDE 0.5 MG: 2.5 INJECTION INTRAVENOUS at 16:09

## 2022-01-28 RX ADMIN — NICARDIPINE HYDROCHLORIDE 0.5 MG: 2.5 INJECTION INTRAVENOUS at 16:18

## 2022-01-28 RX ADMIN — DONEPEZIL HYDROCHLORIDE 5 MG: 5 TABLET, FILM COATED ORAL at 22:37

## 2022-01-28 RX ADMIN — LIDOCAINE HYDROCHLORIDE 50 MG: 10 INJECTION, SOLUTION EPIDURAL; INFILTRATION; INTRACAUDAL; PERINEURAL at 14:58

## 2022-01-28 RX ADMIN — ACETAMINOPHEN 650 MG: 325 TABLET, FILM COATED ORAL at 12:32

## 2022-01-28 RX ADMIN — ONDANSETRON 4 MG: 2 INJECTION INTRAMUSCULAR; INTRAVENOUS at 16:58

## 2022-01-28 RX ADMIN — SODIUM CHLORIDE: 0.9 INJECTION, SOLUTION INTRAVENOUS at 15:11

## 2022-01-28 RX ADMIN — FENTANYL CITRATE 25 MCG: 50 INJECTION, SOLUTION INTRAMUSCULAR; INTRAVENOUS at 15:42

## 2022-01-28 RX ADMIN — FENTANYL CITRATE 50 MCG: 50 INJECTION, SOLUTION INTRAMUSCULAR; INTRAVENOUS at 16:00

## 2022-01-28 RX ADMIN — FENTANYL CITRATE 50 MCG: 50 INJECTION, SOLUTION INTRAMUSCULAR; INTRAVENOUS at 16:37

## 2022-01-28 RX ADMIN — FENTANYL CITRATE 25 MCG: 50 INJECTION, SOLUTION INTRAMUSCULAR; INTRAVENOUS at 15:05

## 2022-01-28 RX ADMIN — PROPOFOL 100 MG: 10 INJECTION, EMULSION INTRAVENOUS at 15:05

## 2022-01-28 RX ADMIN — CEFAZOLIN SODIUM 2 G: 1 INJECTION, POWDER, FOR SOLUTION INTRAMUSCULAR; INTRAVENOUS at 15:11

## 2022-01-28 RX ADMIN — NICARDIPINE HYDROCHLORIDE 0.25 MG: 2.5 INJECTION INTRAVENOUS at 16:01

## 2022-01-28 RX ADMIN — ROCURONIUM BROMIDE 30 MG: 50 INJECTION, SOLUTION INTRAVENOUS at 15:05

## 2022-01-28 RX ADMIN — CARBAMAZEPINE 150 MG: 100 SUSPENSION ORAL at 22:39

## 2022-01-28 RX ADMIN — FENTANYL CITRATE 50 MCG: 50 INJECTION, SOLUTION INTRAMUSCULAR; INTRAVENOUS at 16:48

## 2022-01-28 RX ADMIN — Medication 10 ML: at 09:00

## 2022-01-28 RX ADMIN — SUGAMMADEX 200 MG: 100 INJECTION, SOLUTION INTRAVENOUS at 16:54

## 2022-01-28 RX ADMIN — HYDROCODONE BITARTRATE AND ACETAMINOPHEN 1 TABLET: 5; 325 TABLET ORAL at 08:54

## 2022-01-28 RX ADMIN — FENTANYL CITRATE 25 MCG: 50 INJECTION, SOLUTION INTRAMUSCULAR; INTRAVENOUS at 15:15

## 2022-01-28 RX ADMIN — ROCURONIUM BROMIDE 20 MG: 50 INJECTION, SOLUTION INTRAVENOUS at 15:40

## 2022-01-28 RX ADMIN — FENTANYL CITRATE 50 MCG: 50 INJECTION, SOLUTION INTRAMUSCULAR; INTRAVENOUS at 16:19

## 2022-01-28 RX ADMIN — BUDESONIDE AND FORMOTEROL FUMARATE DIHYDRATE 2 PUFF: 80; 4.5 AEROSOL RESPIRATORY (INHALATION) at 20:05

## 2022-01-28 RX ADMIN — SODIUM CHLORIDE, SODIUM LACTATE, POTASSIUM CHLORIDE, AND CALCIUM CHLORIDE: .6; .31; .03; .02 INJECTION, SOLUTION INTRAVENOUS at 14:58

## 2022-01-28 RX ADMIN — DEXAMETHASONE SODIUM PHOSPHATE 4 MG: 4 INJECTION, SOLUTION INTRA-ARTICULAR; INTRALESIONAL; INTRAMUSCULAR; INTRAVENOUS; SOFT TISSUE at 15:11

## 2022-01-28 NOTE — ANESTHESIA PREPROCEDURE EVALUATION
Anesthesia Evaluation     Patient summary reviewed and Nursing notes reviewed   NPO Solid Status: > 8 hours  NPO Liquid Status: > 8 hours           Airway   Mallampati: II  TM distance: >3 FB  Neck ROM: limited  Dental    (+) edentulous    Pulmonary - normal exam     ROS comment: covid infection  Cardiovascular - normal exam  Exercise tolerance: poor (<4 METS)    ECG reviewed  Patient on routine beta blocker and Beta blocker not taken-may be given intraoperatively    (+) hypertension,       Neuro/Psych  (+) seizures well controlled, psychiatric history, dementia,     GI/Hepatic/Renal/Endo    (+)   renal disease CRI,     Musculoskeletal     (+) arthralgias,       ROS comment: Lt humerus fx  Abdominal    Substance History      OB/GYN          Other   arthritis, blood dyscrasia anemia,     ROS/Med Hx Other: Admitted after fall with LUE fracture                Anesthesia Plan    ASA 4     general     intravenous induction     Anesthetic plan, all risks, benefits, and alternatives have been provided, discussed and informed consent has been obtained with: child.  Use of blood products discussed with child .   Plan discussed with CAA.        CODE STATUS:    Medical Intervention Limits: NO intubation (DNI); NO antiarrhythmic drugs; NO cardioversion; NO vasopressors  Level Of Support Discussed With: Patient  Code Status (Patient has no pulse and is not breathing): No CPR (Do Not Attempt to Resuscitate)  Medical Interventions (Patient has pulse or is breathing): Limited Support

## 2022-01-28 NOTE — ANESTHESIA PROCEDURE NOTES
Airway  Date/Time: 1/28/2022 3:10 PM  Airway not difficult    General Information and Staff    Anesthesiologist: Giuliana Zelaya MD  CRNA: Clarence Cruz CAA    Indications and Patient Condition  Indications for airway management: airway protection    Preoxygenated: yes  MILS maintained throughout  Mask difficulty assessment: 2 - vent by mask + OA or adjuvant +/- NMBA    Final Airway Details  Final airway type: endotracheal airway      Successful airway: ETT  Cuffed: yes   Successful intubation technique: direct laryngoscopy  Endotracheal tube insertion site: oral  Blade: Adebayo  Blade size: 3  ETT size (mm): 7.0  Cormack-Lehane Classification: grade I - full view of glottis  Placement verified by: chest auscultation and capnometry   Measured from: teeth  ETT/EBT  to teeth (cm): 18  Number of attempts at approach: 1  Assessment: lips, teeth, and gum same as pre-op and atraumatic intubation

## 2022-01-29 LAB
ALBUMIN SERPL-MCNC: 2.7 G/DL (ref 3.5–5.2)
ALBUMIN/GLOB SERPL: 0.9 G/DL
ALP SERPL-CCNC: 74 U/L (ref 39–117)
ALT SERPL W P-5'-P-CCNC: 14 U/L (ref 1–33)
ANION GAP SERPL CALCULATED.3IONS-SCNC: 10 MMOL/L (ref 5–15)
ANION GAP SERPL CALCULATED.3IONS-SCNC: 12 MMOL/L (ref 5–15)
AST SERPL-CCNC: 38 U/L (ref 1–32)
BILIRUB SERPL-MCNC: 0.2 MG/DL (ref 0–1.2)
BUN SERPL-MCNC: 27 MG/DL (ref 8–23)
BUN SERPL-MCNC: 31 MG/DL (ref 8–23)
BUN/CREAT SERPL: 18.8 (ref 7–25)
BUN/CREAT SERPL: 19.4 (ref 7–25)
CALCIUM SPEC-SCNC: 7.4 MG/DL (ref 8.6–10.5)
CALCIUM SPEC-SCNC: 7.5 MG/DL (ref 8.6–10.5)
CHLORIDE SERPL-SCNC: 106 MMOL/L (ref 98–107)
CHLORIDE SERPL-SCNC: 109 MMOL/L (ref 98–107)
CO2 SERPL-SCNC: 15 MMOL/L (ref 22–29)
CO2 SERPL-SCNC: 17 MMOL/L (ref 22–29)
CREAT SERPL-MCNC: 1.44 MG/DL (ref 0.57–1)
CREAT SERPL-MCNC: 1.6 MG/DL (ref 0.57–1)
GFR SERPL CREATININE-BSD FRML MDRD: 31 ML/MIN/1.73
GFR SERPL CREATININE-BSD FRML MDRD: 35 ML/MIN/1.73
GLOBULIN UR ELPH-MCNC: 2.9 GM/DL
GLUCOSE SERPL-MCNC: 111 MG/DL (ref 65–99)
GLUCOSE SERPL-MCNC: 88 MG/DL (ref 65–99)
HCT VFR BLD AUTO: 24.8 % (ref 34–46.6)
HGB BLD-MCNC: 8.6 G/DL (ref 12–15.9)
POTASSIUM SERPL-SCNC: 4.1 MMOL/L (ref 3.5–5.2)
POTASSIUM SERPL-SCNC: 4.8 MMOL/L (ref 3.5–5.2)
PROCALCITONIN SERPL-MCNC: 0.22 NG/ML (ref 0–0.25)
PROT SERPL-MCNC: 5.6 G/DL (ref 6–8.5)
SODIUM SERPL-SCNC: 133 MMOL/L (ref 136–145)
SODIUM SERPL-SCNC: 136 MMOL/L (ref 136–145)

## 2022-01-29 PROCEDURE — 85018 HEMOGLOBIN: CPT | Performed by: ORTHOPAEDIC SURGERY

## 2022-01-29 PROCEDURE — 0 CEFAZOLIN PER 500 MG: Performed by: ORTHOPAEDIC SURGERY

## 2022-01-29 PROCEDURE — 80053 COMPREHEN METABOLIC PANEL: CPT | Performed by: ORTHOPAEDIC SURGERY

## 2022-01-29 PROCEDURE — 85014 HEMATOCRIT: CPT | Performed by: ORTHOPAEDIC SURGERY

## 2022-01-29 PROCEDURE — 25010000002 CALCIUM GLUCONATE 2-0.675 GM/100ML-% SOLUTION: Performed by: INTERNAL MEDICINE

## 2022-01-29 PROCEDURE — 25010000002 ENOXAPARIN PER 10 MG: Performed by: ORTHOPAEDIC SURGERY

## 2022-01-29 PROCEDURE — 94799 UNLISTED PULMONARY SVC/PX: CPT

## 2022-01-29 PROCEDURE — 84145 PROCALCITONIN (PCT): CPT | Performed by: INTERNAL MEDICINE

## 2022-01-29 PROCEDURE — 99233 SBSQ HOSP IP/OBS HIGH 50: CPT | Performed by: INTERNAL MEDICINE

## 2022-01-29 RX ORDER — HYDRALAZINE HYDROCHLORIDE 10 MG/1
10 TABLET, FILM COATED ORAL EVERY 8 HOURS SCHEDULED
Status: DISCONTINUED | OUTPATIENT
Start: 2022-01-29 | End: 2022-01-31 | Stop reason: HOSPADM

## 2022-01-29 RX ORDER — CALCIUM GLUCONATE 20 MG/ML
2 INJECTION, SOLUTION INTRAVENOUS ONCE
Status: COMPLETED | OUTPATIENT
Start: 2022-01-29 | End: 2022-01-29

## 2022-01-29 RX ORDER — HYDRALAZINE HYDROCHLORIDE 20 MG/ML
10 INJECTION INTRAMUSCULAR; INTRAVENOUS EVERY 6 HOURS PRN
Status: DISCONTINUED | OUTPATIENT
Start: 2022-01-29 | End: 2022-01-31 | Stop reason: HOSPADM

## 2022-01-29 RX ORDER — SODIUM BICARBONATE 650 MG/1
1300 TABLET ORAL 3 TIMES DAILY
Status: DISCONTINUED | OUTPATIENT
Start: 2022-01-29 | End: 2022-01-31 | Stop reason: HOSPADM

## 2022-01-29 RX ORDER — LABETALOL HYDROCHLORIDE 5 MG/ML
10 INJECTION, SOLUTION INTRAVENOUS EVERY 4 HOURS PRN
Status: DISCONTINUED | OUTPATIENT
Start: 2022-01-29 | End: 2022-01-31 | Stop reason: HOSPADM

## 2022-01-29 RX ADMIN — BUDESONIDE AND FORMOTEROL FUMARATE DIHYDRATE 2 PUFF: 80; 4.5 AEROSOL RESPIRATORY (INHALATION) at 20:35

## 2022-01-29 RX ADMIN — ATENOLOL 50 MG: 50 TABLET ORAL at 07:45

## 2022-01-29 RX ADMIN — CEFAZOLIN 1 G: 1 INJECTION, POWDER, FOR SOLUTION INTRAMUSCULAR; INTRAVENOUS at 16:24

## 2022-01-29 RX ADMIN — OXYCODONE HYDROCHLORIDE 10 MG: 5 TABLET ORAL at 21:57

## 2022-01-29 RX ADMIN — CEFAZOLIN 1 G: 1 INJECTION, POWDER, FOR SOLUTION INTRAMUSCULAR; INTRAVENOUS at 00:01

## 2022-01-29 RX ADMIN — HYDROCODONE BITARTRATE AND ACETAMINOPHEN 1 TABLET: 5; 325 TABLET ORAL at 00:00

## 2022-01-29 RX ADMIN — HYDRALAZINE HYDROCHLORIDE 10 MG: 10 TABLET, FILM COATED ORAL at 21:57

## 2022-01-29 RX ADMIN — DIAZEPAM 5 MG: 5 TABLET ORAL at 00:49

## 2022-01-29 RX ADMIN — ENOXAPARIN SODIUM 30 MG: 30 INJECTION SUBCUTANEOUS at 16:24

## 2022-01-29 RX ADMIN — AMLODIPINE BESYLATE 10 MG: 5 TABLET ORAL at 16:24

## 2022-01-29 RX ADMIN — CARBAMAZEPINE 150 MG: 100 SUSPENSION ORAL at 07:44

## 2022-01-29 RX ADMIN — CEFAZOLIN 1 G: 1 INJECTION, POWDER, FOR SOLUTION INTRAMUSCULAR; INTRAVENOUS at 07:44

## 2022-01-29 RX ADMIN — SERTRALINE HYDROCHLORIDE 50 MG: 50 TABLET, FILM COATED ORAL at 07:45

## 2022-01-29 RX ADMIN — HYDRALAZINE HYDROCHLORIDE 10 MG: 10 TABLET, FILM COATED ORAL at 16:24

## 2022-01-29 RX ADMIN — BUDESONIDE AND FORMOTEROL FUMARATE DIHYDRATE 2 PUFF: 80; 4.5 AEROSOL RESPIRATORY (INHALATION) at 07:25

## 2022-01-29 RX ADMIN — CALCIUM GLUCONATE 2 G: 20 INJECTION, SOLUTION INTRAVENOUS at 11:49

## 2022-01-29 RX ADMIN — SODIUM BICARBONATE 650 MG TABLET 1300 MG: at 16:24

## 2022-01-29 RX ADMIN — DONEPEZIL HYDROCHLORIDE 5 MG: 5 TABLET, FILM COATED ORAL at 21:56

## 2022-01-29 RX ADMIN — SODIUM BICARBONATE 650 MG TABLET 1300 MG: at 21:56

## 2022-01-29 RX ADMIN — CARBAMAZEPINE 150 MG: 100 SUSPENSION ORAL at 21:56

## 2022-01-29 NOTE — ANESTHESIA POSTPROCEDURE EVALUATION
Patient: Maddison Vasquez    Procedure Summary     Date: 01/28/22 Room / Location: [facility] OR 11 / [facility] MAIN OR    Anesthesia Start: 1458 Anesthesia Stop: 1720    Procedures:       HUMERUS PROXIMAL OPEN REDUCTION INTERNAL FIXATION (Left Arm Upper)      WRIST OPEN REDUCTION INTERNAL FIXATION (Left Wrist) Diagnosis:       Closed fracture of proximal end of left humerus, unspecified fracture morphology, initial encounter      Closed Colles' fracture of left radius, initial encounter      (Closed fracture of proximal end of left humerus, unspecified fracture morphology, initial encounter [S42.202A])      (Closed Colles' fracture of left radius, initial encounter [S52.532A])    Surgeons: Bulmaro Wallace MD Provider: Bharat Smith MD    Anesthesia Type: general ASA Status: 4          Anesthesia Type: general    Vitals  Vitals Value Taken Time   /82 01/28/22 1821   Temp 98.2 °F (36.8 °C) 01/28/22 1821   Pulse 65 01/28/22 1821   Resp 16 01/28/22 1821   SpO2 95 % 01/28/22 1821           Post Anesthesia Care and Evaluation    Patient location during evaluation: PACU  Patient participation: complete - patient participated  Level of consciousness: awake  Pain scale: See nurse's notes for pain score.  Pain management: adequate  Airway patency: patent  Anesthetic complications: No anesthetic complications  PONV Status: none  Cardiovascular status: acceptable  Respiratory status: acceptable  Hydration status: acceptable    Comments: Patient seen and examined postoperatively; vital signs stable; SpO2 greater than or equal to 90%; cardiopulmonary status stable; nausea/vomiting adequately controlled; pain adequately controlled; no apparent anesthesia complications; patient discharged from anesthesia care when discharge criteria were met

## 2022-01-29 NOTE — ANESTHESIA POSTPROCEDURE EVALUATION
Patient: Maddison Vasquez    Procedure Summary     Date: 01/28/22 Room / Location: [facility] OR 11 / [facility] MAIN OR    Anesthesia Start: 1458 Anesthesia Stop: 1720    Procedures:       HUMERUS PROXIMAL OPEN REDUCTION INTERNAL FIXATION (Left Arm Upper)      WRIST OPEN REDUCTION INTERNAL FIXATION (Left Wrist) Diagnosis:       Closed fracture of proximal end of left humerus, unspecified fracture morphology, initial encounter      Closed Colles' fracture of left radius, initial encounter      (Closed fracture of proximal end of left humerus, unspecified fracture morphology, initial encounter [S42.202A])      (Closed Colles' fracture of left radius, initial encounter [S52.532A])    Surgeons: Bulmaro Wallace MD Provider: Bharat Smith MD    Anesthesia Type: general ASA Status: 4          Anesthesia Type: general    Vitals  Vitals Value Taken Time   /82 01/28/22 1821   Temp 98.2 °F (36.8 °C) 01/28/22 1821   Pulse 65 01/28/22 1821   Resp 16 01/28/22 1821   SpO2 95 % 01/28/22 1821           Post Anesthesia Care and Evaluation    Patient location during evaluation: PACU  Patient participation: complete - patient participated  Level of consciousness: awake  Pain scale: See nurse's notes for pain score.  Pain management: adequate  Airway patency: patent  Anesthetic complications: No anesthetic complications  PONV Status: none  Cardiovascular status: acceptable  Respiratory status: acceptable  Hydration status: acceptable    Comments: Patient seen and examined postoperatively; vital signs stable; SpO2 greater than or equal to 90%; cardiopulmonary status stable; nausea/vomiting adequately controlled; pain adequately controlled; no apparent anesthesia complications; patient discharged from anesthesia care when discharge criteria were met

## 2022-01-30 LAB
ALBUMIN SERPL-MCNC: 2.6 G/DL (ref 3.5–5.2)
ALBUMIN/GLOB SERPL: 0.9 G/DL
ALP SERPL-CCNC: 82 U/L (ref 39–117)
ALT SERPL W P-5'-P-CCNC: 7 U/L (ref 1–33)
ANION GAP SERPL CALCULATED.3IONS-SCNC: 11 MMOL/L (ref 5–15)
AST SERPL-CCNC: 36 U/L (ref 1–32)
BH BB BLOOD EXPIRATION DATE: NORMAL
BH BB BLOOD EXPIRATION DATE: NORMAL
BH BB BLOOD TYPE BARCODE: 9500
BH BB BLOOD TYPE BARCODE: 9500
BH BB DISPENSE STATUS: NORMAL
BH BB DISPENSE STATUS: NORMAL
BH BB PRODUCT CODE: NORMAL
BH BB PRODUCT CODE: NORMAL
BH BB UNIT NUMBER: NORMAL
BH BB UNIT NUMBER: NORMAL
BILIRUB SERPL-MCNC: 0.3 MG/DL (ref 0–1.2)
BUN SERPL-MCNC: 24 MG/DL (ref 8–23)
BUN/CREAT SERPL: 19 (ref 7–25)
CA-I SERPL ISE-MCNC: 1.14 MMOL/L (ref 1.2–1.3)
CALCIUM SPEC-SCNC: 7.7 MG/DL (ref 8.6–10.5)
CHLORIDE SERPL-SCNC: 108 MMOL/L (ref 98–107)
CO2 SERPL-SCNC: 17 MMOL/L (ref 22–29)
CREAT SERPL-MCNC: 1.26 MG/DL (ref 0.57–1)
CROSSMATCH INTERPRETATION: NORMAL
CROSSMATCH INTERPRETATION: NORMAL
GFR SERPL CREATININE-BSD FRML MDRD: 41 ML/MIN/1.73
GLOBULIN UR ELPH-MCNC: 2.9 GM/DL
GLUCOSE SERPL-MCNC: 93 MG/DL (ref 65–99)
HCT VFR BLD AUTO: 24.8 % (ref 34–46.6)
HGB BLD-MCNC: 8.5 G/DL (ref 12–15.9)
MAGNESIUM SERPL-MCNC: 1.6 MG/DL (ref 1.6–2.4)
NT-PROBNP SERPL-MCNC: 7694 PG/ML (ref 0–1800)
POTASSIUM SERPL-SCNC: 4.1 MMOL/L (ref 3.5–5.2)
PROT SERPL-MCNC: 5.5 G/DL (ref 6–8.5)
QT INTERVAL: 374 MS
SODIUM SERPL-SCNC: 136 MMOL/L (ref 136–145)
UNIT  ABO: NORMAL
UNIT  ABO: NORMAL
UNIT  RH: NORMAL
UNIT  RH: NORMAL

## 2022-01-30 PROCEDURE — 83880 ASSAY OF NATRIURETIC PEPTIDE: CPT | Performed by: INTERNAL MEDICINE

## 2022-01-30 PROCEDURE — 80053 COMPREHEN METABOLIC PANEL: CPT | Performed by: INTERNAL MEDICINE

## 2022-01-30 PROCEDURE — 82330 ASSAY OF CALCIUM: CPT | Performed by: INTERNAL MEDICINE

## 2022-01-30 PROCEDURE — 25010000002 MAGNESIUM SULFATE IN D5W 1G/100ML (PREMIX) 1-5 GM/100ML-% SOLUTION: Performed by: INTERNAL MEDICINE

## 2022-01-30 PROCEDURE — 97530 THERAPEUTIC ACTIVITIES: CPT

## 2022-01-30 PROCEDURE — 85014 HEMATOCRIT: CPT | Performed by: ORTHOPAEDIC SURGERY

## 2022-01-30 PROCEDURE — 99232 SBSQ HOSP IP/OBS MODERATE 35: CPT | Performed by: INTERNAL MEDICINE

## 2022-01-30 PROCEDURE — 97162 PT EVAL MOD COMPLEX 30 MIN: CPT

## 2022-01-30 PROCEDURE — 25010000002 ENOXAPARIN PER 10 MG: Performed by: ORTHOPAEDIC SURGERY

## 2022-01-30 PROCEDURE — 85018 HEMOGLOBIN: CPT | Performed by: ORTHOPAEDIC SURGERY

## 2022-01-30 PROCEDURE — 94799 UNLISTED PULMONARY SVC/PX: CPT

## 2022-01-30 PROCEDURE — 83735 ASSAY OF MAGNESIUM: CPT | Performed by: INTERNAL MEDICINE

## 2022-01-30 RX ORDER — MAGNESIUM SULFATE 1 G/100ML
1 INJECTION INTRAVENOUS ONCE
Status: COMPLETED | OUTPATIENT
Start: 2022-01-30 | End: 2022-01-30

## 2022-01-30 RX ADMIN — SERTRALINE HYDROCHLORIDE 50 MG: 50 TABLET, FILM COATED ORAL at 09:46

## 2022-01-30 RX ADMIN — ATENOLOL 50 MG: 50 TABLET ORAL at 09:46

## 2022-01-30 RX ADMIN — OXYCODONE HYDROCHLORIDE 10 MG: 5 TABLET ORAL at 06:12

## 2022-01-30 RX ADMIN — BUDESONIDE AND FORMOTEROL FUMARATE DIHYDRATE 2 PUFF: 80; 4.5 AEROSOL RESPIRATORY (INHALATION) at 07:58

## 2022-01-30 RX ADMIN — SODIUM BICARBONATE 650 MG TABLET 1300 MG: at 16:28

## 2022-01-30 RX ADMIN — HYDROCODONE BITARTRATE AND ACETAMINOPHEN 1 TABLET: 5; 325 TABLET ORAL at 11:48

## 2022-01-30 RX ADMIN — MAGNESIUM SULFATE HEPTAHYDRATE 1 G: 10 INJECTION, SOLUTION INTRAVENOUS at 11:40

## 2022-01-30 RX ADMIN — CARBAMAZEPINE 150 MG: 100 SUSPENSION ORAL at 09:46

## 2022-01-30 RX ADMIN — BUDESONIDE AND FORMOTEROL FUMARATE DIHYDRATE 2 PUFF: 80; 4.5 AEROSOL RESPIRATORY (INHALATION) at 19:27

## 2022-01-30 RX ADMIN — SODIUM BICARBONATE 650 MG TABLET 1300 MG: at 09:46

## 2022-01-30 RX ADMIN — HYDRALAZINE HYDROCHLORIDE 10 MG: 10 TABLET, FILM COATED ORAL at 20:31

## 2022-01-30 RX ADMIN — SODIUM BICARBONATE 650 MG TABLET 1300 MG: at 20:30

## 2022-01-30 RX ADMIN — HYDRALAZINE HYDROCHLORIDE 10 MG: 10 TABLET, FILM COATED ORAL at 06:07

## 2022-01-30 RX ADMIN — AMLODIPINE BESYLATE 10 MG: 5 TABLET ORAL at 16:28

## 2022-01-30 RX ADMIN — CARBAMAZEPINE 150 MG: 100 SUSPENSION ORAL at 20:30

## 2022-01-30 RX ADMIN — ENOXAPARIN SODIUM 30 MG: 30 INJECTION SUBCUTANEOUS at 16:28

## 2022-01-30 RX ADMIN — ACETAMINOPHEN 650 MG: 325 TABLET, FILM COATED ORAL at 20:31

## 2022-01-30 RX ADMIN — DONEPEZIL HYDROCHLORIDE 5 MG: 5 TABLET, FILM COATED ORAL at 20:31

## 2022-01-31 ENCOUNTER — APPOINTMENT (OUTPATIENT)
Dept: ULTRASOUND IMAGING | Facility: HOSPITAL | Age: 82
End: 2022-01-31

## 2022-01-31 ENCOUNTER — READMISSION MANAGEMENT (OUTPATIENT)
Dept: CALL CENTER | Facility: HOSPITAL | Age: 82
End: 2022-01-31

## 2022-01-31 VITALS
BODY MASS INDEX: 24.94 KG/M2 | OXYGEN SATURATION: 96 % | DIASTOLIC BLOOD PRESSURE: 71 MMHG | TEMPERATURE: 98.8 F | SYSTOLIC BLOOD PRESSURE: 138 MMHG | HEART RATE: 68 BPM | RESPIRATION RATE: 16 BRPM | HEIGHT: 60 IN | WEIGHT: 127 LBS

## 2022-01-31 LAB
ALBUMIN SERPL-MCNC: 2.2 G/DL (ref 3.5–5.2)
ALBUMIN/GLOB SERPL: 0.8 G/DL
ALP SERPL-CCNC: 121 U/L (ref 39–117)
ALT SERPL W P-5'-P-CCNC: 19 U/L (ref 1–33)
ANION GAP SERPL CALCULATED.3IONS-SCNC: 11 MMOL/L (ref 5–15)
AST SERPL-CCNC: 71 U/L (ref 1–32)
BASOPHILS # BLD AUTO: 0.1 10*3/MM3 (ref 0–0.2)
BASOPHILS NFR BLD AUTO: 0.9 % (ref 0–1.5)
BILIRUB SERPL-MCNC: 0.4 MG/DL (ref 0–1.2)
BUN SERPL-MCNC: 31 MG/DL (ref 8–23)
BUN/CREAT SERPL: 20.3 (ref 7–25)
CALCIUM SPEC-SCNC: 7 MG/DL (ref 8.6–10.5)
CHLORIDE SERPL-SCNC: 108 MMOL/L (ref 98–107)
CHLORIDE UR-SCNC: 56 MMOL/L
CO2 SERPL-SCNC: 18 MMOL/L (ref 22–29)
CREAT SERPL-MCNC: 1.53 MG/DL (ref 0.57–1)
CREAT UR-MCNC: 53.6 MG/DL
DEPRECATED RDW RBC AUTO: 46.4 FL (ref 37–54)
EOSINOPHIL # BLD AUTO: 0.1 10*3/MM3 (ref 0–0.4)
EOSINOPHIL NFR BLD AUTO: 1.2 % (ref 0.3–6.2)
ERYTHROCYTE [DISTWIDTH] IN BLOOD BY AUTOMATED COUNT: 14.2 % (ref 12.3–15.4)
GFR SERPL CREATININE-BSD FRML MDRD: 33 ML/MIN/1.73
GLOBULIN UR ELPH-MCNC: 2.9 GM/DL
GLUCOSE SERPL-MCNC: 87 MG/DL (ref 65–99)
HCT VFR BLD AUTO: 22.5 % (ref 34–46.6)
HGB BLD-MCNC: 7.8 G/DL (ref 12–15.9)
LYMPHOCYTES # BLD AUTO: 1.2 10*3/MM3 (ref 0.7–3.1)
LYMPHOCYTES NFR BLD AUTO: 19.9 % (ref 19.6–45.3)
MCH RBC QN AUTO: 32.1 PG (ref 26.6–33)
MCHC RBC AUTO-ENTMCNC: 34.8 G/DL (ref 31.5–35.7)
MCV RBC AUTO: 92.2 FL (ref 79–97)
MONOCYTES # BLD AUTO: 0.5 10*3/MM3 (ref 0.1–0.9)
MONOCYTES NFR BLD AUTO: 7.7 % (ref 5–12)
NEUTROPHILS NFR BLD AUTO: 4.2 10*3/MM3 (ref 1.7–7)
NEUTROPHILS NFR BLD AUTO: 70.3 % (ref 42.7–76)
NRBC BLD AUTO-RTO: 1 /100 WBC (ref 0–0.2)
PLATELET # BLD AUTO: 233 10*3/MM3 (ref 140–450)
PMV BLD AUTO: 8.3 FL (ref 6–12)
POTASSIUM SERPL-SCNC: 4.1 MMOL/L (ref 3.5–5.2)
PROT ?TM UR-MCNC: 203.7 MG/DL
PROT SERPL-MCNC: 5.1 G/DL (ref 6–8.5)
RBC # BLD AUTO: 2.44 10*6/MM3 (ref 3.77–5.28)
SODIUM SERPL-SCNC: 137 MMOL/L (ref 136–145)
SODIUM UR-SCNC: 82 MMOL/L
WBC NRBC COR # BLD: 5.9 10*3/MM3 (ref 3.4–10.8)

## 2022-01-31 PROCEDURE — 80053 COMPREHEN METABOLIC PANEL: CPT | Performed by: INTERNAL MEDICINE

## 2022-01-31 PROCEDURE — 76775 US EXAM ABDO BACK WALL LIM: CPT

## 2022-01-31 PROCEDURE — 82436 ASSAY OF URINE CHLORIDE: CPT | Performed by: INTERNAL MEDICINE

## 2022-01-31 PROCEDURE — 94799 UNLISTED PULMONARY SVC/PX: CPT

## 2022-01-31 PROCEDURE — 84300 ASSAY OF URINE SODIUM: CPT | Performed by: INTERNAL MEDICINE

## 2022-01-31 PROCEDURE — 99239 HOSP IP/OBS DSCHRG MGMT >30: CPT | Performed by: FAMILY MEDICINE

## 2022-01-31 PROCEDURE — 25010000002 ENOXAPARIN PER 10 MG: Performed by: ORTHOPAEDIC SURGERY

## 2022-01-31 PROCEDURE — 85025 COMPLETE CBC W/AUTO DIFF WBC: CPT | Performed by: INTERNAL MEDICINE

## 2022-01-31 PROCEDURE — 84156 ASSAY OF PROTEIN URINE: CPT | Performed by: INTERNAL MEDICINE

## 2022-01-31 PROCEDURE — 82570 ASSAY OF URINE CREATININE: CPT | Performed by: INTERNAL MEDICINE

## 2022-01-31 RX ORDER — SODIUM BICARBONATE 650 MG/1
1300 TABLET ORAL 3 TIMES DAILY
Qty: 84 TABLET | Refills: 0 | Status: SHIPPED | OUTPATIENT
Start: 2022-01-31 | End: 2022-02-14

## 2022-01-31 RX ORDER — SODIUM CHLORIDE 9 MG/ML
75 INJECTION, SOLUTION INTRAVENOUS CONTINUOUS
Status: DISCONTINUED | OUTPATIENT
Start: 2022-01-31 | End: 2022-01-31 | Stop reason: HOSPADM

## 2022-01-31 RX ADMIN — SODIUM BICARBONATE 650 MG TABLET 1300 MG: at 15:32

## 2022-01-31 RX ADMIN — HYDROCODONE BITARTRATE AND ACETAMINOPHEN 1 TABLET: 5; 325 TABLET ORAL at 15:32

## 2022-01-31 RX ADMIN — MAGNESIUM HYDROXIDE 10 ML: 2400 SUSPENSION ORAL at 08:10

## 2022-01-31 RX ADMIN — CARBAMAZEPINE 150 MG: 100 SUSPENSION ORAL at 08:10

## 2022-01-31 RX ADMIN — SERTRALINE HYDROCHLORIDE 50 MG: 50 TABLET, FILM COATED ORAL at 08:10

## 2022-01-31 RX ADMIN — HYDRALAZINE HYDROCHLORIDE 10 MG: 10 TABLET, FILM COATED ORAL at 15:32

## 2022-01-31 RX ADMIN — DIAZEPAM 5 MG: 5 TABLET ORAL at 08:10

## 2022-01-31 RX ADMIN — BUDESONIDE AND FORMOTEROL FUMARATE DIHYDRATE 2 PUFF: 80; 4.5 AEROSOL RESPIRATORY (INHALATION) at 09:26

## 2022-01-31 RX ADMIN — HYDROCODONE BITARTRATE AND ACETAMINOPHEN 1 TABLET: 5; 325 TABLET ORAL at 05:07

## 2022-01-31 RX ADMIN — HYDRALAZINE HYDROCHLORIDE 10 MG: 10 TABLET, FILM COATED ORAL at 05:07

## 2022-01-31 RX ADMIN — ATENOLOL 50 MG: 50 TABLET ORAL at 08:10

## 2022-01-31 RX ADMIN — SODIUM CHLORIDE 75 ML/HR: 9 INJECTION, SOLUTION INTRAVENOUS at 11:19

## 2022-01-31 RX ADMIN — ENOXAPARIN SODIUM 30 MG: 30 INJECTION SUBCUTANEOUS at 15:32

## 2022-01-31 RX ADMIN — SODIUM BICARBONATE 650 MG TABLET 1300 MG: at 08:10

## 2022-02-01 ENCOUNTER — READMISSION MANAGEMENT (OUTPATIENT)
Dept: CALL CENTER | Facility: HOSPITAL | Age: 82
End: 2022-02-01

## 2022-02-01 NOTE — OUTREACH NOTE
COVID-19 Week 1 Survey      Responses   [facility] patient discharged from? [facility]   Does the patient have one of the following disease processes/diagnoses(primary or secondary)? COVID-19   COVID-19 underlying condition? None   Call Number Call 1   Week 1 Call successful? No   Discharge diagnosis closed fracture of humerus open reduction/fixation/[facility]           Donavan Roach RN

## 2022-02-02 ENCOUNTER — READMISSION MANAGEMENT (OUTPATIENT)
Dept: CALL CENTER | Facility: HOSPITAL | Age: 82
End: 2022-02-02

## 2022-02-02 NOTE — OUTREACH NOTE
COVID-19 Week 1 Survey      Responses   [facility] patient discharged from? [facility]   Does the patient have one of the following disease processes/diagnoses(primary or secondary)? COVID-19   COVID-19 underlying condition? None   Call Number Call 2   Week 1 Call successful? No   Discharge diagnosis closed fracture of humerus open reduction/fixation/Covid           She Acuña RN

## 2022-02-02 NOTE — CASE MANAGEMENT/SOCIAL WORK
Case Management Discharge Note      Final Note: HOME WITH [facility]    Provided Post Acute Provider List?: N/A  N/A Provider List Comment: already has home health    Selected Continued Care - Discharged on 1/31/2022 Admission date: 1/27/2022 - Discharge disposition: Home-Health Care [place]        Home Medical Care Coordination complete.    Service Provider Selected Services Address Phone Fax Patient Preferred    [facility] [address] [place] [phone] -- --                       Final Discharge Disposition Code: 06 - home with home health care

## 2022-02-03 ENCOUNTER — READMISSION MANAGEMENT (OUTPATIENT)
Dept: CALL CENTER | Facility: HOSPITAL | Age: 82
End: 2022-02-03

## 2022-02-03 NOTE — OUTREACH NOTE
COVID-19 Week 1 Survey      Responses   [facility] patient discharged from? [facility]   Does the patient have one of the following disease processes/diagnoses(primary or secondary)? COVID-19   COVID-19 underlying condition? None   Call Number Call 3   Week 1 Call successful? No   Discharge diagnosis closed fracture of humerus open reduction/fixation/Covid           Carmen Thornton RN

## 2022-02-11 ENCOUNTER — APPOINTMENT (OUTPATIENT)
Dept: GENERAL RADIOLOGY | Facility: HOSPITAL | Age: 82
End: 2022-02-11

## 2022-02-11 ENCOUNTER — HOSPITAL ENCOUNTER (EMERGENCY)
Facility: HOSPITAL | Age: 82
Discharge: HOME | End: 2022-02-11
Attending: EMERGENCY MEDICINE | Admitting: EMERGENCY MEDICINE

## 2022-02-11 VITALS
HEART RATE: 68 BPM | TEMPERATURE: 98.6 F | WEIGHT: 117.5 LBS | RESPIRATION RATE: 18 BRPM | DIASTOLIC BLOOD PRESSURE: 75 MMHG | HEIGHT: 60 IN | SYSTOLIC BLOOD PRESSURE: 166 MMHG | OXYGEN SATURATION: 96 % | BODY MASS INDEX: 23.07 KG/M2

## 2022-02-11 DIAGNOSIS — T84.9XXA: ICD-10-CM

## 2022-02-11 DIAGNOSIS — M25.512 LEFT SHOULDER PAIN, UNSPECIFIED CHRONICITY: Primary | ICD-10-CM

## 2022-02-11 LAB
BASOPHILS # BLD AUTO: 0.1 10*3/MM3 (ref 0–0.2)
BASOPHILS NFR BLD AUTO: 0.7 % (ref 0–1.5)
DEPRECATED RDW RBC AUTO: 47.7 FL (ref 37–54)
EOSINOPHIL # BLD AUTO: 0.1 10*3/MM3 (ref 0–0.4)
EOSINOPHIL NFR BLD AUTO: 1.2 % (ref 0.3–6.2)
ERYTHROCYTE [DISTWIDTH] IN BLOOD BY AUTOMATED COUNT: 14.3 % (ref 12.3–15.4)
HCT VFR BLD AUTO: 24.9 % (ref 34–46.6)
HGB BLD-MCNC: 8.4 G/DL (ref 12–15.9)
LYMPHOCYTES # BLD AUTO: 1.3 10*3/MM3 (ref 0.7–3.1)
LYMPHOCYTES NFR BLD AUTO: 15.4 % (ref 19.6–45.3)
MCH RBC QN AUTO: 31.8 PG (ref 26.6–33)
MCHC RBC AUTO-ENTMCNC: 33.7 G/DL (ref 31.5–35.7)
MCV RBC AUTO: 94.5 FL (ref 79–97)
MONOCYTES # BLD AUTO: 0.7 10*3/MM3 (ref 0.1–0.9)
MONOCYTES NFR BLD AUTO: 8.2 % (ref 5–12)
NEUTROPHILS NFR BLD AUTO: 6.1 10*3/MM3 (ref 1.7–7)
NEUTROPHILS NFR BLD AUTO: 74.5 % (ref 42.7–76)
NRBC BLD AUTO-RTO: 0 /100 WBC (ref 0–0.2)
PLATELET # BLD AUTO: 396 10*3/MM3 (ref 140–450)
PMV BLD AUTO: 7 FL (ref 6–12)
RBC # BLD AUTO: 2.63 10*6/MM3 (ref 3.77–5.28)
WBC NRBC COR # BLD: 8.2 10*3/MM3 (ref 3.4–10.8)

## 2022-02-11 PROCEDURE — 73030 X-RAY EXAM OF SHOULDER: CPT

## 2022-02-11 PROCEDURE — 85025 COMPLETE CBC W/AUTO DIFF WBC: CPT | Performed by: PHYSICIAN ASSISTANT

## 2022-02-11 PROCEDURE — 99283 EMERGENCY DEPT VISIT LOW MDM: CPT

## 2022-02-11 RX ORDER — SODIUM CHLORIDE 0.9 % (FLUSH) 0.9 %
10 SYRINGE (ML) INJECTION AS NEEDED
Status: DISCONTINUED | OUTPATIENT
Start: 2022-02-11 | End: 2022-02-12 | Stop reason: HOSPADM

## 2022-02-12 NOTE — ED PROVIDER NOTES
Subjective   Chief Complaint: Left shoulder pain, swelling    Patient is a 81-year-old  female with history of dementia, hypertension, seizures presents the ER with complaints of left shoulder pain and swelling after she had shoulder surgery about 2 weeks ago.  Patient is a poor historian secondary to dementia, patient's granddaughter at bedside provides history.  She reports patient had left shoulder replacement after she fractured her shoulder 2 weeks ago per Dr. Wallace.  Granddaughter reports her grandmother was complaining of some pain and swelling with some redness on her left arm that started 2 days ago.  She denies any bleeding or drainage.  She denies any headache, fever or chills.  She denies any chest pain, shortness of breath, Noe pain, nausea vomiting diarrhea.  She denies any left lower arm swelling or pain.  Patient has a follow-up appointment with orthopedic specialist in 3 days.    PCP: None  Ortho: Dr. Wallace      History provided by:  Patient and relative  History limited by:  Dementia      Review of Systems   Constitutional: Negative for chills and fever.   HENT: Negative for sore throat and trouble swallowing.    Respiratory: Negative for shortness of breath and wheezing.    Cardiovascular: Negative for chest pain.   Gastrointestinal: Negative for abdominal pain, diarrhea, nausea and vomiting.   Genitourinary: Negative for dysuria.   Musculoskeletal: Positive for arthralgias. Negative for myalgias.        Left shoulder pain, swelling   Skin: Positive for color change. Negative for rash.   Neurological: Negative for headaches.   Psychiatric/Behavioral: Negative for behavioral problems.   All other systems reviewed and are negative.      Past Medical History:   Diagnosis Date   • Dementia (HCC)    • Hearing loss    • Hypertension    • Normochromic normocytic anemia    • Seizures (HCC)    • Stage III chronic kidney disease (HCC)        No Known Allergies    Past Surgical History:    Procedure Laterality Date   • BACK SURGERY     • BRAIN SURGERY     • ORIF HUMERUS FRACTURE Left 1/28/2022    Procedure: HUMERUS PROXIMAL OPEN REDUCTION INTERNAL FIXATION;  Surgeon: Bulmaro Wallace MD;  Location: [facility] MAIN OR;  Service: Orthopedics;  Laterality: Left;   • ORIF WRIST FRACTURE Left 1/28/2022    Procedure: WRIST OPEN REDUCTION INTERNAL FIXATION;  Surgeon: Bulmaro Wallace MD;  Location: [facility] MAIN OR;  Service: Orthopedics;  Laterality: Left;       Family History   Problem Relation Age of Onset   • No Known Problems Mother    • No Known Problems Father        Social History     Socioeconomic History   • Marital status: Single   Tobacco Use   • Smoking status: Light Tobacco Smoker     Packs/day: 0.25   • Smokeless tobacco: Never Used   Substance and Sexual Activity   • Alcohol use: Never   • Drug use: Never   • Sexual activity: Defer           Objective   Physical Exam  Vitals and nursing note reviewed.   Constitutional:       Appearance: Normal appearance. She is well-developed and normal weight. She is not ill-appearing or toxic-appearing.   HENT:      Head: Normocephalic and atraumatic.   Eyes:      Pupils: Pupils are equal, round, and reactive to light.   Cardiovascular:      Rate and Rhythm: Normal rate and regular rhythm.      Pulses: Normal pulses.      Heart sounds: Normal heart sounds. No murmur heard.      Pulmonary:      Effort: Pulmonary effort is normal. No respiratory distress.      Breath sounds: Normal breath sounds. No wheezing.   Abdominal:      General: Bowel sounds are normal.      Palpations: Abdomen is soft.   Musculoskeletal:         General: Swelling and tenderness present. No deformity.      Cervical back: Normal range of motion.      Comments: Staples intact anterior left shoulder from previous shoulder surgery.  Staples are intact, skin is clean, pink with no drainage, no dehiscence  Soft tissue swelling underneath incision site with some erythema, warmth, no drainage or  "bleeding   Skin:     General: Skin is warm and dry.      Capillary Refill: Capillary refill takes less than 2 seconds.      Findings: Erythema present. No bruising or rash.   Neurological:      General: No focal deficit present.      Mental Status: She is alert and oriented to person, place, and time.      Cranial Nerves: No cranial nerve deficit.   Psychiatric:         Mood and Affect: Mood normal.         Behavior: Behavior normal.         Procedures           ED Course  ED Course as of 02/13/22 1633   Fri Feb 11, 2022 2020 XR Shoulder 2+ View Left  Reviewed by myself and Dr. Decker [MM]   2025 Spoke with Dr. Wallace, recommended patient be put in sling, she has an appointment with his office on Monday. [MM]      ED Course User Index  [MM] Kalli Murrieta PA    /75 (BP Location: Right arm, Patient Position: Lying)   Pulse 68   Temp 98.6 °F (37 °C) (Oral)   Resp 18   Ht 152.4 cm (60\")   Wt 53.3 kg (117 lb 8.1 oz)   SpO2 96%   BMI 22.95 kg/m²   Labs Reviewed   CBC WITH AUTO DIFFERENTIAL - Abnormal; Notable for the following components:       Result Value    RBC 2.63 (*)     Hemoglobin 8.4 (*)     Hematocrit 24.9 (*)     Lymphocyte % 15.4 (*)     All other components within normal limits   CBC AND DIFFERENTIAL    Narrative:     The following orders were created for panel order CBC & Differential.  Procedure                               Abnormality         Status                     ---------                               -----------         ------                     CBC Auto Differential[[id number]]        Abnormal            Final result                 Please view results for these tests on the individual orders.     Medications - No data to display  XR Shoulder 2+ View Left    Result Date: 2/11/2022  The proximal humeral fracture has displaced since the fluoroscopic images. There is no healing. There may be hardware loosening. No acute fractures.  Electronically Signed By-Keerthi Tim MD On:2/11/2022 " 8:14 PM This report was finalized on [id number] by  Keerthi Tim MD.                                                 MDM  Number of Diagnoses or Management Options  Complication of internal fixation device of left humerus, unspecified complication, initial encounter (HCC)  Left shoulder pain, unspecified chronicity  Diagnosis management comments: MEDICAL DECISION  Epic Chart Review: Patient admitted 2 weeks ago for fall, left humeral fracture, had this repaired per Dr. Wallace.  Comorbidities: Dementia, hypertension, seizures  Differentials: Septic joint, cellulitis, dislocation, malunion; this list is not all inclusive and does not constitute the entirety of considered causes  Radiology interpretation:  Images reviewed by me and interpreted by radiologist, as above  Lab interpretation:  Labs viewed by me significant for, as above    While in the ED IV was placed and labs were obtained appropriate PPE was worn during exam and throughout all encounters with the patient.  Patient had the above evaluation.  IV established, lab work obtained.  Patient's incision site for left shoulder surgery is clean, pink, moist and intact, there is no evidence of erythema, drainage or wound dehiscence.  CBC unremarkable, no leukocytosis, hemoglobin 8.4, hematocrit 24.9, patient has history of anemia.  X-ray left shoulder shows proximal humeral fracture that is displaced since fluoroscopic imaging, no healing, might be hardware loosening.  This image was reviewed with Dr. Decker.  Consult placed to Ortho, spoke with Dr. Wallace who recommended patient be placed in sling and swath and follow-up in the office.  Patient has appointment on Monday, 2/14/2022.  Findings discussed with patient and granddaughter at bedside, all questions answered.  Patient instructed to wear his shoulder sling at all times until she is evaluated by Dr. Wallace.    Discharge plan and instructions were discussed with the patient who verbalized understanding and is in  agreement with the plan, all questions were answered at this time.  Patient is aware of signs symptoms that would require immediate return to the emergency room.  Patient understands importance of following up with primary care provider for further evaluation and worsening concerns as well as blood pressure recheck in the next 4 weeks.    Patient was discharged in improved stable condition.         Amount and/or Complexity of Data Reviewed  Clinical lab tests: reviewed and ordered  Tests in the radiology section of CPT®: reviewed and ordered    Patient Progress  Patient progress: stable      Final diagnoses:   Left shoulder pain, unspecified chronicity   Complication of internal fixation device of left humerus, unspecified complication, initial encounter (HCC)       ED Disposition  ED Disposition     ED Disposition Condition Comment    Discharge Stable           PATIENT [facility] [id number] [phone]  Schedule an appointment as soon as possible for a visit in 2 days  As needed, If symptoms worsen    Bulmaro Wallace MD  [address] [place] [id number] [phone]    Schedule an appointment as soon as possible for a visit in 2 days  If symptoms worsen, As needed         Medication List      No changes were made to your prescriptions during this visit.          Kalli Murrieta PA  02/13/22 2350

## 2022-02-12 NOTE — DISCHARGE INSTRUCTIONS
Take Tylenol or ibuprofen as needed for pain  Wear your shoulder sling every day at all times until you are seen by Dr. Wallace    Follow-up with your primary care provider next week for recheck    Return to the ER for new or worsening symptoms

## 2022-07-11 ENCOUNTER — HOSPITAL ENCOUNTER (EMERGENCY)
Facility: HOSPITAL | Age: 82
Discharge: NURSING, REHAB OR LONG-TERM CARE | End: 2022-07-11
Attending: EMERGENCY MEDICINE | Admitting: EMERGENCY MEDICINE

## 2022-07-11 ENCOUNTER — APPOINTMENT (OUTPATIENT)
Dept: GENERAL RADIOLOGY | Facility: HOSPITAL | Age: 82
End: 2022-07-11

## 2022-07-11 ENCOUNTER — APPOINTMENT (OUTPATIENT)
Dept: CT IMAGING | Facility: HOSPITAL | Age: 82
End: 2022-07-11

## 2022-07-11 VITALS
OXYGEN SATURATION: 99 % | HEART RATE: 87 BPM | TEMPERATURE: 97.8 F | DIASTOLIC BLOOD PRESSURE: 77 MMHG | RESPIRATION RATE: 17 BRPM | WEIGHT: 130 LBS | SYSTOLIC BLOOD PRESSURE: 165 MMHG | HEIGHT: 61 IN | BODY MASS INDEX: 24.55 KG/M2

## 2022-07-11 DIAGNOSIS — S52.691A OTHER CLOSED FRACTURE OF DISTAL END OF RIGHT ULNA, INITIAL ENCOUNTER: ICD-10-CM

## 2022-07-11 DIAGNOSIS — S01.01XA LACERATION OF SCALP, INITIAL ENCOUNTER: ICD-10-CM
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
## 2022-07-11 DIAGNOSIS — S00.03XA CONTUSION OF SCALP, INITIAL ENCOUNTER: ICD-10-CM

## 2022-07-11 PROCEDURE — 70450 CT HEAD/BRAIN W/O DYE: CPT

## 2022-07-11 PROCEDURE — 99283 EMERGENCY DEPT VISIT LOW MDM: CPT

## 2022-07-11 PROCEDURE — 73110 X-RAY EXAM OF WRIST: CPT

## 2022-07-11 PROCEDURE — 72125 CT NECK SPINE W/O DYE: CPT
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
## 2022-07-12 NOTE — DISCHARGE INSTRUCTIONS
Keep wound clean peroxide and water twice daily apply antibiotic ointment.  Stitches out in about 10 days.  Return for fever redness drainage foul odor vomiting altered mental status unequal pupils confusion or any other new or worsening problems or concerns.  Follow-up with orthopedist listed above for her ulnar fracture next week.  Return for any other new or worse problems.

## 2022-12-28 NOTE — PROGRESS NOTES
12/28/22 1236   Discharge Assessment   Assessment Type Discharge Planning Assessment   Confirmed Demographics Correct on Facesheet   Source of Information patient   Does patient/caregiver understand observation status Yes   Communicated JULES with patient/caregiver Yes   Reason For Admission cerv. spondylosis with radiculopathy   People in Home child(merary), adult   Facility Arrived From: home   Do you expect to return to your current living situation? Yes   Do you have help at home or someone to help you manage your care at home? Yes   Who are your caregiver(s) and their phone number(s)? giuseppe Alanis son [phone]   Prior to hospitilization cognitive status: Alert/Oriented;No Deficits   Equipment Currently Used at Home none   Readmission within 30 days? No   Patient currently being followed by outpatient case management? No   Do you currently have service(s) that help you manage your care at home? No   Do you take prescription medications? Yes   Do you have prescription coverage? No   Do you have any problems affording any of your prescribed medications? No   Who is going to help you get home at discharge? maurice chin   How do you get to doctors appointments? car, drives self   Are you on dialysis? No   Discharge Plan A Home with family   Discharge Plan B Home with family   DME Needed Upon Discharge  none   Discharge Plan discussed with: Patient   Discharge Barriers Identified None   Financial Resource Strain   How hard is it for you to pay for the very basics like food, housing, medical care, and heating? Not hard   Housing Stability   In the last 12 months, was there a time when you were not able to pay the mortgage or rent on time? N   In the last 12 months, was there a time when you did not have a steady place to sleep or slept in a shelter (including now)? N   Transportation Needs   In the past 12 months, has lack of transportation kept you from medical appointments or from getting medications? no   In the past  Discharge Planning Assessment   Eugenio     Patient Name: Maddison Vasquez  MRN: [id number]  Today's Date: 12/18/2020    Admit Date: 12/16/2020    Discharge Needs Assessment     Row Name 12/18/20 1149       Living Environment    Lives With  other (see comments) 24/7 caregiver named Anaya    Current Living Arrangements  home/apartment/condo    Primary Care Provided by  other (see comments) Caregiver, Anaya    Provides Primary Care For  no one    Family Caregiver if Needed  child(ashanti), adult    Able to Return to Prior Arrangements  yes       Transition Planning    Patient/Family Anticipates Transition to  home with help/services    Transportation Anticipated  family or friend will provide       Discharge Needs Assessment    Equipment Currently Used at Home  walker, rolling    Concerns to be Addressed  no discharge needs identified    Equipment Needed After Discharge  none        Discharge Plan     Row Name 12/18/20 1152       Plan    Plan  Plan to return home with caregiver Anaya, current with [facility] is pharmacy.    Plan Comments  Spoke with patient and granddaughter. Plan to return home wiht 24/7 caregiver. Currently has walker w/wheels. Current with [facility]. Anticipate d/c today.     Demographic Summary     Row Name 12/18/20 1147       General Information    Admission Type  observation    Arrived From  emergency department    Referral Source  admission list    Reason for Consult  discharge planning    Preferred Language  English        Functional Status     Row Name 12/18/20 1148       Functional Status    Usual Activity Tolerance  good    Current Activity Tolerance  good       Functional Status, IADL    Medications  assistive person    Meal Preparation  assistive person    Housekeeping  assistive person    Laundry  assistive person    Shopping  assistive person       Mental Status    General Appearance WDL  WDL   .Met with patient in room wearing PPE: mask, face shield/goggles, gloves, gown.       Maintained distance greater than six feet and spent less than 15 minutes in the room.      Gi Ribeiro RN     12 months, has lack of transportation kept you from meetings, work, or from getting things needed for daily living? No   Food Insecurity   Within the past 12 months, the food you bought just didn't last and you didn't have money to get more. Never true   OTHER   Name(s) of People in Home son, Mateo and another adlut son reside with pt.     Pt is indep with ambulation and ADL. She is retired and 2 adult son's reside with her. She cont. To drive uses [email or web address] in [place], Pcp is Sole Trevino. Started on new pain regimen today.  Plan is to discharge later today.  Pt refused HH therapy and is amb. Without and aids.

## 2023-12-02 ENCOUNTER — HOSPITAL ENCOUNTER (EMERGENCY)
Facility: HOSPITAL | Age: 83
Discharge: HOME | End: 2023-12-03
Attending: EMERGENCY MEDICINE | Admitting: EMERGENCY MEDICINE
Payer: MEDICARE

## 2023-12-02 ENCOUNTER — APPOINTMENT (OUTPATIENT)
Dept: CT IMAGING | Facility: HOSPITAL | Age: 83
End: 2023-12-02
Payer: MEDICARE
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
## 2023-12-02 DIAGNOSIS — S01.81XA LACERATION OF FOREHEAD, INITIAL ENCOUNTER: Primary | ICD-10-CM

## 2023-12-02 PROCEDURE — 90715 TDAP VACCINE 7 YRS/> IM: CPT | Performed by: EMERGENCY MEDICINE

## 2023-12-02 PROCEDURE — 25010000002 TETANUS-DIPHTH-ACELL PERTUSSIS 5-2.5-18.5 LF-MCG/0.5 SUSPENSION PREFILLED SYRINGE: Performed by: EMERGENCY MEDICINE

## 2023-12-02 PROCEDURE — 99284 EMERGENCY DEPT VISIT MOD MDM: CPT

## 2023-12-02 PROCEDURE — 70450 CT HEAD/BRAIN W/O DYE: CPT

## 2023-12-02 PROCEDURE — 90471 IMMUNIZATION ADMIN: CPT | Performed by: EMERGENCY MEDICINE

## 2023-12-02 RX ORDER — DIAPER,BRIEF,INFANT-TODD,DISP
1 EACH MISCELLANEOUS EVERY 12 HOURS SCHEDULED
Status: DISCONTINUED | OUTPATIENT
Start: 2023-12-02 | End: 2023-12-03 | Stop reason: HOSPADM

## 2023-12-02 RX ADMIN — BACITRACIN 0.9 G: 500 OINTMENT TOPICAL at 22:55

## 2023-12-02 RX ADMIN — TETANUS TOXOID, REDUCED DIPHTHERIA TOXOID AND ACELLULAR PERTUSSIS VACCINE, ADSORBED 0.5 ML: 5; 2.5; 8; 8; 2.5 SUSPENSION INTRAMUSCULAR at 22:54

## 2023-12-03 VITALS
BODY MASS INDEX: 27.38 KG/M2 | WEIGHT: 145 LBS | HEIGHT: 61 IN | TEMPERATURE: 98.7 F | DIASTOLIC BLOOD PRESSURE: 95 MMHG | SYSTOLIC BLOOD PRESSURE: 179 MMHG | HEART RATE: 99 BPM | RESPIRATION RATE: 18 BRPM | OXYGEN SATURATION: 100 %

## 2023-12-03 NOTE — ED PROVIDER NOTES
"Subjective   History of Present Illness  Chief complaint: Fall    83-year-old female presents from a facility after a fall.  Patient sustained a laceration to her forehead.  She did not have any loss of consciousness.  She has a history of dementia and is oriented to self only at baseline.  Patient denies any complaints of pain.    History provided by:  EMS personnel      Review of Systems   Constitutional:  Negative for fever.   HENT:  Negative for congestion.    Respiratory:  Negative for cough and shortness of breath.    Cardiovascular:  Negative for chest pain.   Gastrointestinal:  Negative for abdominal pain and vomiting.   Musculoskeletal:  Negative for back pain and neck pain.   Neurological:  Negative for headaches.       Past Medical History:   Diagnosis Date    Dementia     Hearing loss     Hypertension     Normochromic normocytic anemia     Seizures     Stage III chronic kidney disease        No Known Allergies    Past Surgical History:   Procedure Laterality Date    BACK SURGERY      BRAIN SURGERY      ORIF HUMERUS FRACTURE Left 1/28/2022    Procedure: HUMERUS PROXIMAL OPEN REDUCTION INTERNAL FIXATION;  Surgeon: Bulmaro Wallace MD;  Location: [facility];  Service: Orthopedics;  Laterality: Left;    ORIF WRIST FRACTURE Left 1/28/2022    Procedure: WRIST OPEN REDUCTION INTERNAL FIXATION;  Surgeon: Bulmaro Wallace MD;  Location: [facility];  Service: Orthopedics;  Laterality: Left;       Family History   Problem Relation Age of Onset    No Known Problems Mother     No Known Problems Father        Social History     Socioeconomic History    Marital status: Single   Tobacco Use    Smoking status: Light Smoker     Packs/day: .25     Types: Cigarettes    Smokeless tobacco: Never   Substance and Sexual Activity    Alcohol use: Never    Drug use: Never    Sexual activity: Defer       /79   Pulse 107   Temp 98.7 °F (37.1 °C)   Resp 20   Ht 154.9 cm (61\")   Wt 65.8 kg (145 lb)   SpO2 97%   BMI " 27.40 kg/m²       Objective   Physical Exam  Vitals and nursing note reviewed.   Constitutional:       Appearance: Normal appearance.   HENT:      Head: Normocephalic.      Comments: There is an irregular superficial laceration to the middle upper part of the forehead that is more of a skin tear.     Mouth/Throat:      Mouth: Mucous membranes are moist.   Neck:      Comments: No C-spine tenderness  Cardiovascular:      Rate and Rhythm: Normal rate and regular rhythm.      Heart sounds: Normal heart sounds.   Pulmonary:      Effort: Pulmonary effort is normal. No respiratory distress.      Breath sounds: Normal breath sounds.   Abdominal:      Palpations: Abdomen is soft.      Tenderness: There is no abdominal tenderness.   Musculoskeletal:         General: No deformity or signs of injury.   Skin:     General: Skin is warm and dry.   Neurological:      General: No focal deficit present.      Mental Status: She is alert.         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 No acute intracranial abnormality. Electronically Signed: Ankur Hurtado MD  12/2/2023 9:32 PM EST  Workstation ID: [id number]                                          Medical Decision Making  Amount and/or Complexity of Data Reviewed  Radiology: ordered.    Risk  Prescription drug management.      CT head showed no acute intracranial abnormality.  The skin tear to the forehead did not require any suture repair.  It was cleaned and dressed.  Patient was given a tetanus shot.  She is stable for discharge.      Final diagnoses:   Laceration of forehead, initial encounter   Fall, initial encounter       ED Disposition  ED Disposition       ED Disposition   Discharge    Condition   Stable    Comment   --               No follow-up provider specified.       Medication List      No changes  were made to your prescriptions during this visit.            Brandon Barton MD  12/02/23 0564

## 2023-12-03 NOTE — ED NOTES
Nurse rounded on patient. Patient is comfortable, denies pain, has been repositioned, and has been offered toileting. Patient has been updated and all questions answered.

## 2023-12-07 ENCOUNTER — HOSPITAL ENCOUNTER (INPATIENT)
Facility: HOSPITAL | Age: 83
LOS: 7 days | Discharge: NURSING, REHAB OR LONG-TERM CARE | End: 2023-12-14
Attending: INTERNAL MEDICINE | Admitting: INTERNAL MEDICINE
Payer: MEDICARE

## 2023-12-07 ENCOUNTER — APPOINTMENT (OUTPATIENT)
Dept: GENERAL RADIOLOGY | Facility: HOSPITAL | Age: 83
End: 2023-12-07
Payer: MEDICARE

## 2023-12-07 ENCOUNTER — APPOINTMENT (OUTPATIENT)
Dept: CT IMAGING | Facility: HOSPITAL | Age: 83
End: 2023-12-07
Payer: MEDICARE

## 2023-12-07 ENCOUNTER — APPOINTMENT (OUTPATIENT)
Dept: CARDIOLOGY | Facility: HOSPITAL | Age: 83
End: 2023-12-07
Payer: MEDICARE

## 2023-12-07 DIAGNOSIS — R41.82 ALTERED MENTAL STATUS, UNSPECIFIED ALTERED MENTAL STATUS TYPE: ICD-10-CM

## 2023-12-07 DIAGNOSIS — E87.5 HYPERKALEMIA: ICD-10-CM

## 2023-12-07 DIAGNOSIS — N39.0 UTI (URINARY TRACT INFECTION), UNCOMPLICATED: ICD-10-CM

## 2023-12-07 DIAGNOSIS — E87.0 HYPERNATREMIA: ICD-10-CM

## 2023-12-07 DIAGNOSIS — N17.9 ACUTE RENAL FAILURE, UNSPECIFIED ACUTE RENAL FAILURE TYPE: Primary | ICD-10-CM

## 2023-12-07 LAB
ALBUMIN SERPL-MCNC: 3.5 G/DL (ref 3.5–5.2)
ALBUMIN/GLOB SERPL: 0.8 G/DL
ALP SERPL-CCNC: 123 U/L (ref 39–117)
ALT SERPL W P-5'-P-CCNC: 35 U/L (ref 1–33)
ANION GAP SERPL CALCULATED.3IONS-SCNC: 14 MMOL/L (ref 5–15)
AST SERPL-CCNC: 27 U/L (ref 1–32)
BACTERIA UR QL AUTO: ABNORMAL /HPF
BASOPHILS # BLD AUTO: 0.1 10*3/MM3 (ref 0–0.2)
BASOPHILS NFR BLD AUTO: 0.6 % (ref 0–1.5)
BILIRUB SERPL-MCNC: 0.2 MG/DL (ref 0–1.2)
BILIRUB UR QL STRIP: NEGATIVE
BUN SERPL-MCNC: 85 MG/DL (ref 8–23)
BUN/CREAT SERPL: 42.5 (ref 7–25)
CALCIUM SPEC-SCNC: 9.6 MG/DL (ref 8.6–10.5)
CHLORIDE SERPL-SCNC: 119 MMOL/L (ref 98–107)
CLARITY UR: ABNORMAL
CO2 SERPL-SCNC: 19 MMOL/L (ref 22–29)
COLOR UR: YELLOW
CREAT SERPL-MCNC: 2 MG/DL (ref 0.57–1)
D-LACTATE SERPL-SCNC: 1.7 MMOL/L (ref 0.3–2)
DEPRECATED RDW RBC AUTO: 48.1 FL (ref 37–54)
EGFRCR SERPLBLD CKD-EPI 2021: 24.4 ML/MIN/1.73
EOSINOPHIL # BLD AUTO: 0.1 10*3/MM3 (ref 0–0.4)
EOSINOPHIL NFR BLD AUTO: 0.5 % (ref 0.3–6.2)
ERYTHROCYTE [DISTWIDTH] IN BLOOD BY AUTOMATED COUNT: 12.9 % (ref 12.3–15.4)
GLOBULIN UR ELPH-MCNC: 4.6 GM/DL
GLUCOSE BLDC GLUCOMTR-MCNC: 79 MG/DL (ref 70–105)
GLUCOSE SERPL-MCNC: 175 MG/DL (ref 65–99)
GLUCOSE UR STRIP-MCNC: NEGATIVE MG/DL
HCT VFR BLD AUTO: 28.1 % (ref 34–46.6)
HGB BLD-MCNC: 9 G/DL (ref 12–15.9)
HGB UR QL STRIP.AUTO: ABNORMAL
HOLD SPECIMEN: NORMAL
HYALINE CASTS UR QL AUTO: ABNORMAL /LPF
KETONES UR QL STRIP: NEGATIVE
LEUKOCYTE ESTERASE UR QL STRIP.AUTO: ABNORMAL
LYMPHOCYTES # BLD AUTO: 1.4 10*3/MM3 (ref 0.7–3.1)
LYMPHOCYTES NFR BLD AUTO: 12.5 % (ref 19.6–45.3)
MCH RBC QN AUTO: 31.9 PG (ref 26.6–33)
MCHC RBC AUTO-ENTMCNC: 32 G/DL (ref 31.5–35.7)
MCV RBC AUTO: 99.7 FL (ref 79–97)
MONOCYTES # BLD AUTO: 1.2 10*3/MM3 (ref 0.1–0.9)
MONOCYTES NFR BLD AUTO: 10.7 % (ref 5–12)
NEUTROPHILS NFR BLD AUTO: 75.7 % (ref 42.7–76)
NEUTROPHILS NFR BLD AUTO: 8.3 10*3/MM3 (ref 1.7–7)
NITRITE UR QL STRIP: NEGATIVE
NRBC BLD AUTO-RTO: 0.1 /100 WBC (ref 0–0.2)
PH UR STRIP.AUTO: 5.5 [PH] (ref 5–8)
PLATELET # BLD AUTO: 358 10*3/MM3 (ref 140–450)
PMV BLD AUTO: 8.9 FL (ref 6–12)
POTASSIUM SERPL-SCNC: 5.8 MMOL/L (ref 3.5–5.2)
PROT SERPL-MCNC: 8.1 G/DL (ref 6–8.5)
PROT UR QL STRIP: ABNORMAL
RBC # BLD AUTO: 2.82 10*6/MM3 (ref 3.77–5.28)
RBC # UR STRIP: ABNORMAL /HPF
REF LAB TEST METHOD: ABNORMAL
SODIUM SERPL-SCNC: 152 MMOL/L (ref 136–145)
SP GR UR STRIP: 1.01 (ref 1–1.03)
SQUAMOUS #/AREA URNS HPF: ABNORMAL /HPF
UROBILINOGEN UR QL STRIP: ABNORMAL
WBC # UR STRIP: ABNORMAL /HPF
WBC NRBC COR # BLD AUTO: 11 10*3/MM3 (ref 3.4–10.8)
WHOLE BLOOD HOLD COAG: NORMAL

## 2023-12-07 PROCEDURE — 93005 ELECTROCARDIOGRAM TRACING: CPT | Performed by: INTERNAL MEDICINE

## 2023-12-07 PROCEDURE — 25810000003 SODIUM CHLORIDE 0.9 % SOLUTION: Performed by: NURSE PRACTITIONER

## 2023-12-07 PROCEDURE — 25010000002 CEFTRIAXONE PER 250 MG: Performed by: NURSE PRACTITIONER

## 2023-12-07 PROCEDURE — 87088 URINE BACTERIA CULTURE: CPT | Performed by: NURSE PRACTITIONER

## 2023-12-07 PROCEDURE — 99285 EMERGENCY DEPT VISIT HI MDM: CPT

## 2023-12-07 PROCEDURE — 72100 X-RAY EXAM L-S SPINE 2/3 VWS: CPT

## 2023-12-07 PROCEDURE — 87186 SC STD MICRODIL/AGAR DIL: CPT | Performed by: NURSE PRACTITIONER

## 2023-12-07 PROCEDURE — 63710000001 INSULIN REGULAR HUMAN PER 5 UNITS: Performed by: NURSE PRACTITIONER

## 2023-12-07 PROCEDURE — 36415 COLL VENOUS BLD VENIPUNCTURE: CPT

## 2023-12-07 PROCEDURE — 80053 COMPREHEN METABOLIC PANEL: CPT | Performed by: NURSE PRACTITIONER

## 2023-12-07 PROCEDURE — 71045 X-RAY EXAM CHEST 1 VIEW: CPT

## 2023-12-07 PROCEDURE — 87040 BLOOD CULTURE FOR BACTERIA: CPT | Performed by: NURSE PRACTITIONER

## 2023-12-07 PROCEDURE — 82948 REAGENT STRIP/BLOOD GLUCOSE: CPT

## 2023-12-07 PROCEDURE — 70450 CT HEAD/BRAIN W/O DYE: CPT

## 2023-12-07 PROCEDURE — 25010000002 CALCIUM GLUCONATE-NACL 1-0.675 GM/50ML-% SOLUTION: Performed by: NURSE PRACTITIONER

## 2023-12-07 PROCEDURE — 25010000002 ENOXAPARIN PER 10 MG: Performed by: INTERNAL MEDICINE

## 2023-12-07 PROCEDURE — 87086 URINE CULTURE/COLONY COUNT: CPT | Performed by: NURSE PRACTITIONER

## 2023-12-07 PROCEDURE — 83605 ASSAY OF LACTIC ACID: CPT

## 2023-12-07 PROCEDURE — 81001 URINALYSIS AUTO W/SCOPE: CPT | Performed by: NURSE PRACTITIONER

## 2023-12-07 PROCEDURE — 73502 X-RAY EXAM HIP UNI 2-3 VIEWS: CPT

## 2023-12-07 PROCEDURE — 85025 COMPLETE CBC W/AUTO DIFF WBC: CPT | Performed by: NURSE PRACTITIONER

## 2023-12-07 PROCEDURE — P9612 CATHETERIZE FOR URINE SPEC: HCPCS

## 2023-12-07 PROCEDURE — 73030 X-RAY EXAM OF SHOULDER: CPT

## 2023-12-07 RX ORDER — SODIUM CHLORIDE 0.9 % (FLUSH) 0.9 %
10 SYRINGE (ML) INJECTION EVERY 12 HOURS SCHEDULED
Status: DISCONTINUED | OUTPATIENT
Start: 2023-12-07 | End: 2023-12-14 | Stop reason: HOSPADM

## 2023-12-07 RX ORDER — DIVALPROEX SODIUM 125 MG/1
250 CAPSULE, COATED PELLETS ORAL 3 TIMES DAILY
COMMUNITY

## 2023-12-07 RX ORDER — DEXTROSE MONOHYDRATE 25 G/50ML
25 INJECTION, SOLUTION INTRAVENOUS ONCE
Status: COMPLETED | OUTPATIENT
Start: 2023-12-07 | End: 2023-12-07

## 2023-12-07 RX ORDER — ASPIRIN 81 MG/1
81 TABLET, CHEWABLE ORAL DAILY
Status: DISCONTINUED | OUTPATIENT
Start: 2023-12-07 | End: 2023-12-14 | Stop reason: HOSPADM

## 2023-12-07 RX ORDER — ENEMA 19; 7 G/133ML; G/133ML
1 ENEMA RECTAL ONCE AS NEEDED
COMMUNITY

## 2023-12-07 RX ORDER — SODIUM CHLORIDE 9 MG/ML
40 INJECTION, SOLUTION INTRAVENOUS AS NEEDED
Status: DISCONTINUED | OUTPATIENT
Start: 2023-12-07 | End: 2023-12-08

## 2023-12-07 RX ORDER — CALCIUM GLUCONATE 20 MG/ML
1000 INJECTION, SOLUTION INTRAVENOUS ONCE
Status: COMPLETED | OUTPATIENT
Start: 2023-12-07 | End: 2023-12-07

## 2023-12-07 RX ORDER — BISACODYL 10 MG
10 SUPPOSITORY, RECTAL RECTAL DAILY PRN
COMMUNITY

## 2023-12-07 RX ORDER — SODIUM CHLORIDE 0.9 % (FLUSH) 0.9 %
10 SYRINGE (ML) INJECTION AS NEEDED
Status: DISCONTINUED | OUTPATIENT
Start: 2023-12-07 | End: 2023-12-14 | Stop reason: HOSPADM

## 2023-12-07 RX ORDER — FERROUS SULFATE 325(65) MG
325 TABLET ORAL
COMMUNITY

## 2023-12-07 RX ORDER — FUROSEMIDE 20 MG/1
20 TABLET ORAL DAILY
COMMUNITY

## 2023-12-07 RX ORDER — ENOXAPARIN SODIUM 100 MG/ML
30 INJECTION SUBCUTANEOUS DAILY
Status: DISCONTINUED | OUTPATIENT
Start: 2023-12-07 | End: 2023-12-14 | Stop reason: HOSPADM

## 2023-12-07 RX ORDER — ATORVASTATIN CALCIUM 40 MG/1
80 TABLET, FILM COATED ORAL NIGHTLY
Status: DISCONTINUED | OUTPATIENT
Start: 2023-12-07 | End: 2023-12-14 | Stop reason: HOSPADM

## 2023-12-07 RX ORDER — TRAMADOL HYDROCHLORIDE 50 MG/1
50 TABLET ORAL EVERY 8 HOURS PRN
COMMUNITY

## 2023-12-07 RX ORDER — ASPIRIN 300 MG/1
300 SUPPOSITORY RECTAL DAILY
Status: DISCONTINUED | OUTPATIENT
Start: 2023-12-07 | End: 2023-12-14 | Stop reason: HOSPADM

## 2023-12-07 RX ORDER — POTASSIUM CHLORIDE 750 MG/1
10 TABLET, FILM COATED, EXTENDED RELEASE ORAL DAILY
COMMUNITY

## 2023-12-07 RX ORDER — LANOLIN ALCOHOL/MO/W.PET/CERES
3 CREAM (GRAM) TOPICAL DAILY
COMMUNITY

## 2023-12-07 RX ORDER — ACETAMINOPHEN 325 MG/1
650 TABLET ORAL EVERY 6 HOURS PRN
COMMUNITY

## 2023-12-07 RX ADMIN — CALCIUM GLUCONATE 1000 MG: 20 INJECTION, SOLUTION INTRAVENOUS at 16:01

## 2023-12-07 RX ADMIN — Medication 10 ML: at 20:11

## 2023-12-07 RX ADMIN — ASPIRIN 300 MG: 300 SUPPOSITORY RECTAL at 18:57

## 2023-12-07 RX ADMIN — DEXTROSE MONOHYDRATE 25 G: 25 INJECTION, SOLUTION INTRAVENOUS at 16:00

## 2023-12-07 RX ADMIN — INSULIN HUMAN 10 UNITS: 100 INJECTION, SOLUTION PARENTERAL at 16:01

## 2023-12-07 RX ADMIN — CEFTRIAXONE 2000 MG: 2 INJECTION, POWDER, FOR SOLUTION INTRAMUSCULAR; INTRAVENOUS at 15:19

## 2023-12-07 RX ADMIN — SODIUM BICARBONATE 50 MEQ: 84 INJECTION INTRAVENOUS at 16:00

## 2023-12-07 RX ADMIN — SODIUM CHLORIDE 500 ML: 9 INJECTION, SOLUTION INTRAVENOUS at 15:19

## 2023-12-07 RX ADMIN — ENOXAPARIN SODIUM 30 MG: 100 INJECTION SUBCUTANEOUS at 18:57

## 2023-12-07 NOTE — Clinical Note
Level of Care: Telemetry [5]   Admitting Physician: ARTHUR MOTTA [[id number]]   Attending Physician: ARTHUR MOTTA [[id number]]

## 2023-12-07 NOTE — ED PROVIDER NOTES
"Subjective   History of Present Illness  83-year-old demented female presented to the emergency room from the extended care facility for \"pill pocketing\" and difficulty swallowing and left-sided deficit.  EMS report that patient was kicking and swinging with both arms and legs in route.  Patient resides at [facility] and her known well was last Thursday      Review of Systems    Past Medical History:   Diagnosis Date    Dementia     Hearing loss     Hypertension     Normochromic normocytic anemia     Seizures     Stage III chronic kidney disease        No Known Allergies    Past Surgical History:   Procedure Laterality Date    BACK SURGERY      BRAIN SURGERY      ORIF HUMERUS FRACTURE Left 1/28/2022    Procedure: HUMERUS PROXIMAL OPEN REDUCTION INTERNAL FIXATION;  Surgeon: Bulmaro Wallace MD;  Location: [facility];  Service: Orthopedics;  Laterality: Left;    ORIF WRIST FRACTURE Left 1/28/2022    Procedure: WRIST OPEN REDUCTION INTERNAL FIXATION;  Surgeon: Bulmaro Wallace MD;  Location: [facility];  Service: Orthopedics;  Laterality: Left;       Family History   Problem Relation Age of Onset    No Known Problems Mother     No Known Problems Father        Social History     Socioeconomic History    Marital status: Single   Tobacco Use    Smoking status: Light Smoker     Packs/day: .25     Types: Cigarettes    Smokeless tobacco: Never   Substance and Sexual Activity    Alcohol use: Never    Drug use: Never    Sexual activity: Defer           Objective   Physical Exam  Constitutional:       General: She is not in acute distress.     Appearance: She is ill-appearing and toxic-appearing. She is not diaphoretic.   HENT:      Head: Normocephalic.      Mouth/Throat:      Mouth: Mucous membranes are dry.   Eyes:      Extraocular Movements: Extraocular movements intact.      Conjunctiva/sclera: Conjunctivae normal.      Pupils: Pupils are equal, round, and reactive to light.   Cardiovascular:      Rate " and Rhythm: Normal rate.   Pulmonary:      Effort: Pulmonary effort is normal.   Abdominal:      Palpations: Abdomen is soft.   Skin:     General: Skin is warm and dry.      Capillary Refill: Capillary refill takes 2 to 3 seconds.   Neurological:      General: No focal deficit present.      Mental Status: She is alert.      Motor: Weakness present.         Procedures           ED Course      Labs Reviewed   COMPREHENSIVE METABOLIC PANEL - Abnormal; Notable for the following components:       Result Value    Glucose 175 (*)     BUN 85 (*)     Creatinine 2.00 (*)     Sodium 152 (*)     Potassium 5.8 (*)     Chloride 119 (*)     CO2 19.0 (*)     ALT (SGPT) 35 (*)     Alkaline Phosphatase 123 (*)     BUN/Creatinine Ratio 42.5 (*)     eGFR 24.4 (*)     All other components within normal limits    Narrative:     GFR Normal >60  Chronic Kidney Disease <60  Kidney Failure <15    The GFR formula is only valid for adults with stable renal function between ages 18 and 70.   URINALYSIS W/ CULTURE IF INDICATED - Abnormal; Notable for the following components:    Appearance, UA Cloudy (*)     Blood, UA Large (3+) (*)     Protein,  mg/dL (2+) (*)     Leuk Esterase, UA Large (3+) (*)     All other components within normal limits    Narrative:     In absence of clinical symptoms, the presence of pyuria, bacteria, and/or nitrites on the urinalysis result does not correlate with infection.   CBC WITH AUTO DIFFERENTIAL - Abnormal; Notable for the following components:    WBC 11.00 (*)     RBC 2.82 (*)     Hemoglobin 9.0 (*)     Hematocrit 28.1 (*)     MCV 99.7 (*)     Lymphocyte % 12.5 (*)     Neutrophils, Absolute 8.30 (*)     Monocytes, Absolute 1.20 (*)     All other components within normal limits   URINALYSIS, MICROSCOPIC ONLY - Abnormal; Notable for the following components:    RBC, UA Too Numerous to Count (*)     WBC, UA Too Numerous to Count (*)     Bacteria, UA 1+ (*)     All other components within normal limits   POC  LACTATE - Normal   BLOOD CULTURE   BLOOD CULTURE   URINE CULTURE   LACTIC ACID, PLASMA   POC LACTATE   CBC AND DIFFERENTIAL    Narrative:     The following orders were created for panel order CBC & Differential.  Procedure                               Abnormality         Status                     ---------                               -----------         ------                     CBC Auto Differential[[id number]]        Abnormal            Final result                 Please view results for these tests on the individual orders.   EXTRA TUBES    Narrative:     The following orders were created for panel order Extra Tubes.  Procedure                               Abnormality         Status                     ---------                               -----------         ------                     Gold Top - SST[[id number]]                                   Final result               Light Blue Top[[id number]]                                   Final result                 Please view results for these tests on the individual orders.   GOLD TOP - SST   LIGHT BLUE TOP                                 CT Head Without Contrast    Result Date: 12/7/2023  Impression: Somewhat limited study. Extensive chronic findings. No acute process identified. Electronically Signed: Oliva Curtis MD  12/7/2023 2:24 PM EST  Workstation ID: [id number]    XR Chest 1 View    Result Date: 12/7/2023  Impression: 1.No acute process identified. Electronically Signed: Tim Coon MD  12/7/2023 1:03 PM CST  Workstation ID: [id number]          Medications   cefTRIAXone (ROCEPHIN) 2,000 mg in sodium chloride 0.9 % 100 mL IVPB (has no administration in time range)   sodium chloride 0.9 % bolus 500 mL (has no administration in time range)   dextrose (D50W) (25 g/50 mL) IV injection 25 g (has no administration in time range)   sodium bicarbonate injection 8.4% 50 mEq (has no administration in time range)   calcium gluconate 1000 Mg/50ml 0.675% NaCl IV  "ETIENNE (has no administration in time range)      Medical Decision Making  83-year-old demented female presents to the emergency room via EMS for reported \"swallowing difficulties and pocketing food\" and left-sided deficits.  EMS report that patient's last known well was last Thursday.  Patient presents via EMS from [facility]/skilled nursing facility.    Problems Addressed:  Acute renal failure, unspecified acute renal failure type: complicated acute illness or injury  Altered mental status, unspecified altered mental status type: complicated acute illness or injury     Details: CT of head was obtained and was found to be negative.  Hypernatremia: complicated acute illness or injury     Details: Most likely due to acute renal failure.  UTI (urinary tract infection), uncomplicated: complicated acute illness or injury     Details: Rocephin ordered and administered while in ED.  Rectal temp is 99.0.    Amount and/or Complexity of Data Reviewed  Labs: ordered.     Details: Urinalysis is significant for too numerous to count white blood cell and 3+ leukocytes.  Rocephin ordered and administered  Radiology: ordered.     Details: CT of head is negative for acute    Risk  Prescription drug management.  Decision regarding hospitalization.  Risk Details: Admit to Dr. Dumas and agrees to accept after patient workup discussed.        Final diagnoses:   Acute renal failure, unspecified acute renal failure type   UTI (urinary tract infection), uncomplicated   Altered mental status, unspecified altered mental status type   Hypernatremia   Hyperkalemia       ED Disposition  ED Disposition       ED Disposition   Decision to Admit    Condition   --    Comment   --               No follow-up provider specified.       Medication List      No changes were made to your prescriptions during this visit.            Traci Phipps, APRN  12/07/23 1537    "

## 2023-12-07 NOTE — ED NOTES
Called [facility]. Spoke to Kori. She is weak on her left side, had a facial droop, does not swallow, and has been pocketing food and meds. Started since first assisted fall last Thursday when pt was noticeably more weak. Pt has severe dementia. Her baseline is that she does not answer questions and only responds to her name. Pt was ambulatory before fall. Nurse stated that she shuffled, but since the falls she has not been ambulatory. Pt had an assisted fall Thursday and Friday, but had an unwitnessed fall Saturday and hit her head. Pt was sent to ED and checked out. Pt was OK to be sent back. Pt had been weak since then. Nurse stated that she had been pocketing meds and food since yesterday. Hx CKD stage 3, COPD, hard of hearing, and anemia. Pt does not take thinners. Given tramadol this morning. Pt also swats at staff when working with her.

## 2023-12-07 NOTE — H&P
[facility]   HISTORY AND PHYSICAL    Patient Name: Maddison Vasquez  : 1940  MRN: [id number]  Primary Care Physician:  Provider, No Known  Date of admission: 2023    Subjective   Subjective     Chief Complaint: Patient sent from the [facility] with change mental status  Patient with left-sided weakness  Patient is poor historian history obtained from record  Patient found with hyponatremia and acute kidney insufficiency with hyperkalemia and UTI    HPI:    Maddison Vasquez is a 83 y.o. female patient in skilled nursing facility  Patient had couple fall event on Saturday  Seen in the ER here and [facility] she had another fall patient was periorbital hematoma  Patient brought here with change mental status her CT was essentially negative in the ER  Patient was found to be hypernatremic with hyperkalemia and acute kidney insufficiency with UTI  Patient seen with granddaughter at bedside  Patient is not giving any details she keeps crying saying no for an answer for all questions  There is marked weakness on the left side she is not able to move her left thigh and left arm  There is some swelling noted on the left leg    Review of Systems   Unobtainable from the patient due to mental status    Personal History     Past Medical History:   Diagnosis Date    Dementia     Hearing loss     Hypertension     Normochromic normocytic anemia     Seizures     Stage III chronic kidney disease        Past Surgical History:   Procedure Laterality Date    BACK SURGERY      BRAIN SURGERY      ORIF HUMERUS FRACTURE Left 2022    Procedure: HUMERUS PROXIMAL OPEN REDUCTION INTERNAL FIXATION;  Surgeon: Bulmaro Wallace MD;  Location: [facility] OR;  Service: Orthopedics;  Laterality: Left;    ORIF WRIST FRACTURE Left 2022    Procedure: WRIST OPEN REDUCTION INTERNAL FIXATION;  Surgeon: Bulmaro Wallace MD;  Location: [facility] OR;  Service: Orthopedics;   Laterality: Left;       Family History: family history includes No Known Problems in her father and mother. Otherwise pertinent FHx was reviewed and not pertinent to current issue.    Social History:  reports that she has been smoking. She has been smoking an average of .25 packs per day. She has never used smokeless tobacco. She reports that she does not drink alcohol and does not use drugs.    Home Medications:  Divalproex Sodium, Fluticasone Furoate-Vilanterol, acetaminophen, amLODIPine, atenolol, bisacodyl, carBAMazepine, ferrous sulfate, fleet enema, furosemide, magnesium hydroxide, melatonin, potassium chloride, sertraline, traMADol, and vitamin D    Allergies:  No Known Allergies    Objective   Objective     Vitals:   Temp:  [99 °F (37.2 °C)] 99 °F (37.2 °C)  Heart Rate:  [90-97] 90  Resp:  [16-24] 24  BP: (145-159)/(62-80) 149/79  Physical Exam    Constitutional: Patient is awake confused not answering any question except for saying no   Eyes: PERRLA, sclerae anicteric, no conjunctival injection   HENT: NCAT, right mucous membrane with periorbital hematoma on around the right due to the previous fall   Neck: Supple, no thyromegaly, no lymphadenopathy, trachea midline   Respiratory: Clear to auscultation bilaterally, nonlabored respirations    Cardiovascular: RRR, no murmurs, rubs, or gallops, palpable pedal pulses bilaterally   Gastrointestinal: Positive bowel sounds, soft, nontender, nondistended   Musculoskeletal: No bilateral ankle edema, no clubbing or cyanosis to extremities   Neuro she is awake agitated very confused  Following simple commands on the right side but not moving her left side  But as per EMS she was earlier on moving her left side when she was fighting the EMS    Result Review    Result Review:  I have personally reviewed the results from the time of this admission to 12/7/2023 16:13 EST and agree with these findings:  []  Laboratory list / accordion  []  Microbiology  []  Radiology  []   EKG/Telemetry   []  Cardiology/Vascular   []  Pathology  []  Old records  []  Other:  Most notable findings include:       Assessment & Plan   Assessment / Plan     Brief Patient Summary:  Maddison Vasquez is a 83 y.o. female who came here with change mental status  Patient is confused agitated  She is not able to move her left side  Her initial CAT scan was negative  Patient with UTI and advanced dementia  Patient with hypernatremia and hyperkalemia and acute kidney insufficiency  Per granddaughter patient was functional couple months ago  Xray hip and shoulder no fx,lumbar spine with t8 and t12 and l5 compression fx looks chronic      Active Hospital Problems:  Active Hospital Problems    Diagnosis     **URSULA (acute kidney injury)      Plan:   Discussed CODE STATUS with granddaughter and we agreed on DNR but full active treatment\  Start on IV antibiotic for UTI will continue IV Rocephin  Will check x-ray of the left hip and the lumbar spine and the left arm and a venous Doppler of the left leg  Keep patient on a monitored bed  DNR status  VTE prophylaxis  Hydration IV fluids and renal consult due to her acute kidney insufficiency and hyponatremia and hyperkalemia  Neuroconsult will be ordered  Prognosis is guarded    DVT prophylaxis:  Medical and mechanical DVT prophylaxis orders are present.    CODE STATUS:    Level Of Support Discussed With: Health Care Surrogate  Code Status (Patient has no pulse and is not breathing): No CPR (Do Not Attempt to Resuscitate)  Medical Interventions (Patient has pulse or is breathing): Full Support    Admission Status:  I believe this patient meets ip status.    Gisela Dumas MD

## 2023-12-08 ENCOUNTER — APPOINTMENT (OUTPATIENT)
Dept: CARDIOLOGY | Facility: HOSPITAL | Age: 83
End: 2023-12-08
Payer: MEDICARE

## 2023-12-08 LAB
AMMONIA BLD-SCNC: 30 UMOL/L (ref 11–51)
ANION GAP SERPL CALCULATED.3IONS-SCNC: 14 MMOL/L (ref 5–15)
BASOPHILS # BLD AUTO: 0.1 10*3/MM3 (ref 0–0.2)
BASOPHILS NFR BLD AUTO: 1.4 % (ref 0–1.5)
BH CV LOW VAS LEFT PERONEAL VESSEL: 1
BH CV LOWER VASCULAR LEFT COMMON FEMORAL AUGMENT: NORMAL
BH CV LOWER VASCULAR LEFT COMMON FEMORAL COMPETENT: NORMAL
BH CV LOWER VASCULAR LEFT COMMON FEMORAL COMPRESS: NORMAL
BH CV LOWER VASCULAR LEFT COMMON FEMORAL PHASIC: NORMAL
BH CV LOWER VASCULAR LEFT COMMON FEMORAL SPONT: NORMAL
BH CV LOWER VASCULAR LEFT DISTAL FEMORAL COMPRESS: NORMAL
BH CV LOWER VASCULAR LEFT GASTRONEMIUS COMPRESS: NORMAL
BH CV LOWER VASCULAR LEFT GREATER SAPH AK COMPRESS: NORMAL
BH CV LOWER VASCULAR LEFT GREATER SAPH BK COMPRESS: NORMAL
BH CV LOWER VASCULAR LEFT LESSER SAPH COMPRESS: NORMAL
BH CV LOWER VASCULAR LEFT MID FEMORAL AUGMENT: NORMAL
BH CV LOWER VASCULAR LEFT MID FEMORAL COMPETENT: NORMAL
BH CV LOWER VASCULAR LEFT MID FEMORAL COMPRESS: NORMAL
BH CV LOWER VASCULAR LEFT MID FEMORAL PHASIC: NORMAL
BH CV LOWER VASCULAR LEFT MID FEMORAL SPONT: NORMAL
BH CV LOWER VASCULAR LEFT PERONEAL COMPRESS: NORMAL
BH CV LOWER VASCULAR LEFT PERONEAL THROMBUS: NORMAL
BH CV LOWER VASCULAR LEFT POPLITEAL AUGMENT: NORMAL
BH CV LOWER VASCULAR LEFT POPLITEAL COMPETENT: NORMAL
BH CV LOWER VASCULAR LEFT POPLITEAL COMPRESS: NORMAL
BH CV LOWER VASCULAR LEFT POPLITEAL PHASIC: NORMAL
BH CV LOWER VASCULAR LEFT POPLITEAL SPONT: NORMAL
BH CV LOWER VASCULAR LEFT POSTERIOR TIBIAL COMPRESS: NORMAL
BH CV LOWER VASCULAR LEFT PROXIMAL FEMORAL COMPRESS: NORMAL
BH CV LOWER VASCULAR LEFT SAPHENOFEMORAL JUNCTION COMPRESS: NORMAL
BH CV LOWER VASCULAR RIGHT COMMON FEMORAL AUGMENT: NORMAL
BH CV LOWER VASCULAR RIGHT COMMON FEMORAL COMPETENT: NORMAL
BH CV LOWER VASCULAR RIGHT COMMON FEMORAL COMPRESS: NORMAL
BH CV LOWER VASCULAR RIGHT COMMON FEMORAL PHASIC: NORMAL
BH CV LOWER VASCULAR RIGHT COMMON FEMORAL SPONT: NORMAL
BH CV LOWER VASCULAR RIGHT DISTAL FEMORAL COMPRESS: NORMAL
BH CV LOWER VASCULAR RIGHT GASTRONEMIUS COMPRESS: NORMAL
BH CV LOWER VASCULAR RIGHT GREATER SAPH AK COMPRESS: NORMAL
BH CV LOWER VASCULAR RIGHT GREATER SAPH BK COMPRESS: NORMAL
BH CV LOWER VASCULAR RIGHT LESSER SAPH COMPRESS: NORMAL
BH CV LOWER VASCULAR RIGHT MID FEMORAL AUGMENT: NORMAL
BH CV LOWER VASCULAR RIGHT MID FEMORAL COMPETENT: NORMAL
BH CV LOWER VASCULAR RIGHT MID FEMORAL COMPRESS: NORMAL
BH CV LOWER VASCULAR RIGHT MID FEMORAL PHASIC: NORMAL
BH CV LOWER VASCULAR RIGHT MID FEMORAL SPONT: NORMAL
BH CV LOWER VASCULAR RIGHT PERONEAL COMPRESS: NORMAL
BH CV LOWER VASCULAR RIGHT POPLITEAL AUGMENT: NORMAL
BH CV LOWER VASCULAR RIGHT POPLITEAL COMPETENT: NORMAL
BH CV LOWER VASCULAR RIGHT POPLITEAL COMPRESS: NORMAL
BH CV LOWER VASCULAR RIGHT POPLITEAL PHASIC: NORMAL
BH CV LOWER VASCULAR RIGHT POPLITEAL SPONT: NORMAL
BH CV LOWER VASCULAR RIGHT POSTERIOR TIBIAL COMPRESS: NORMAL
BH CV LOWER VASCULAR RIGHT PROXIMAL FEMORAL COMPRESS: NORMAL
BH CV LOWER VASCULAR RIGHT SAPHENOFEMORAL JUNCTION COMPRESS: NORMAL
BH CV VAS PRELIMINARY FINDINGS SCRIPTING: 1
BUN SERPL-MCNC: 85 MG/DL (ref 8–23)
BUN/CREAT SERPL: 42.7 (ref 7–25)
CALCIUM SPEC-SCNC: 9.4 MG/DL (ref 8.6–10.5)
CARBAMAZEPINE SERPL-MCNC: 2.3 MCG/ML (ref 4–12)
CHLORIDE SERPL-SCNC: 124 MMOL/L (ref 98–107)
CHOLEST SERPL-MCNC: 110 MG/DL (ref 0–200)
CO2 SERPL-SCNC: 20 MMOL/L (ref 22–29)
CREAT SERPL-MCNC: 1.99 MG/DL (ref 0.57–1)
D-LACTATE SERPL-SCNC: 1.7 MMOL/L (ref 0.5–2)
DEPRECATED RDW RBC AUTO: 47.3 FL (ref 37–54)
EGFRCR SERPLBLD CKD-EPI 2021: 24.5 ML/MIN/1.73
EOSINOPHIL # BLD AUTO: 0 10*3/MM3 (ref 0–0.4)
EOSINOPHIL NFR BLD AUTO: 0.2 % (ref 0.3–6.2)
ERYTHROCYTE [DISTWIDTH] IN BLOOD BY AUTOMATED COUNT: 13 % (ref 12.3–15.4)
GLUCOSE BLDC GLUCOMTR-MCNC: 152 MG/DL (ref 70–105)
GLUCOSE SERPL-MCNC: 180 MG/DL (ref 65–99)
HBA1C MFR BLD: 6 % (ref 4.8–5.6)
HCT VFR BLD AUTO: 27.8 % (ref 34–46.6)
HDLC SERPL-MCNC: 28 MG/DL (ref 40–60)
HGB BLD-MCNC: 8.9 G/DL (ref 12–15.9)
LDLC SERPL CALC-MCNC: 58 MG/DL (ref 0–100)
LDLC/HDLC SERPL: 1.96 {RATIO}
LYMPHOCYTES # BLD AUTO: 1.7 10*3/MM3 (ref 0.7–3.1)
LYMPHOCYTES NFR BLD AUTO: 16.4 % (ref 19.6–45.3)
MCH RBC QN AUTO: 32.6 PG (ref 26.6–33)
MCHC RBC AUTO-ENTMCNC: 32.1 G/DL (ref 31.5–35.7)
MCV RBC AUTO: 101.5 FL (ref 79–97)
MONOCYTES # BLD AUTO: 0.9 10*3/MM3 (ref 0.1–0.9)
MONOCYTES NFR BLD AUTO: 8.7 % (ref 5–12)
NEUTROPHILS NFR BLD AUTO: 7.6 10*3/MM3 (ref 1.7–7)
NEUTROPHILS NFR BLD AUTO: 73.3 % (ref 42.7–76)
NRBC BLD AUTO-RTO: 0 /100 WBC (ref 0–0.2)
PLATELET # BLD AUTO: 351 10*3/MM3 (ref 140–450)
PMV BLD AUTO: 9.4 FL (ref 6–12)
POTASSIUM SERPL-SCNC: 4.8 MMOL/L (ref 3.5–5.2)
RBC # BLD AUTO: 2.74 10*6/MM3 (ref 3.77–5.28)
SODIUM SERPL-SCNC: 158 MMOL/L (ref 136–145)
TRIGL SERPL-MCNC: 135 MG/DL (ref 0–150)
TROPONIN T SERPL HS-MCNC: 41 NG/L
VLDLC SERPL-MCNC: 24 MG/DL (ref 5–40)
WBC NRBC COR # BLD AUTO: 10.3 10*3/MM3 (ref 3.4–10.8)

## 2023-12-08 PROCEDURE — 83036 HEMOGLOBIN GLYCOSYLATED A1C: CPT | Performed by: INTERNAL MEDICINE

## 2023-12-08 PROCEDURE — 82140 ASSAY OF AMMONIA: CPT | Performed by: PSYCHIATRY & NEUROLOGY

## 2023-12-08 PROCEDURE — 87102 FUNGUS ISOLATION CULTURE: CPT | Performed by: INTERNAL MEDICINE

## 2023-12-08 PROCEDURE — 99221 1ST HOSP IP/OBS SF/LOW 40: CPT | Performed by: PSYCHIATRY & NEUROLOGY

## 2023-12-08 PROCEDURE — 97167 OT EVAL HIGH COMPLEX 60 MIN: CPT

## 2023-12-08 PROCEDURE — 80156 ASSAY CARBAMAZEPINE TOTAL: CPT | Performed by: PSYCHIATRY & NEUROLOGY

## 2023-12-08 PROCEDURE — 25010000002 ENOXAPARIN PER 10 MG: Performed by: INTERNAL MEDICINE

## 2023-12-08 PROCEDURE — 97162 PT EVAL MOD COMPLEX 30 MIN: CPT

## 2023-12-08 PROCEDURE — 85025 COMPLETE CBC W/AUTO DIFF WBC: CPT | Performed by: INTERNAL MEDICINE

## 2023-12-08 PROCEDURE — 84484 ASSAY OF TROPONIN QUANT: CPT | Performed by: INTERNAL MEDICINE

## 2023-12-08 PROCEDURE — 92610 EVALUATE SWALLOWING FUNCTION: CPT

## 2023-12-08 PROCEDURE — 25010000002 CEFTRIAXONE PER 250 MG: Performed by: INTERNAL MEDICINE

## 2023-12-08 PROCEDURE — 82948 REAGENT STRIP/BLOOD GLUCOSE: CPT

## 2023-12-08 PROCEDURE — 36415 COLL VENOUS BLD VENIPUNCTURE: CPT | Performed by: PSYCHIATRY & NEUROLOGY

## 2023-12-08 PROCEDURE — 80061 LIPID PANEL: CPT | Performed by: INTERNAL MEDICINE

## 2023-12-08 PROCEDURE — 83605 ASSAY OF LACTIC ACID: CPT | Performed by: INTERNAL MEDICINE

## 2023-12-08 PROCEDURE — 0 DEXTROSE 5 % SOLUTION: Performed by: INTERNAL MEDICINE

## 2023-12-08 PROCEDURE — 93970 EXTREMITY STUDY: CPT

## 2023-12-08 PROCEDURE — 80048 BASIC METABOLIC PNL TOTAL CA: CPT | Performed by: INTERNAL MEDICINE

## 2023-12-08 RX ORDER — DEXTROSE MONOHYDRATE 50 MG/ML
100 INJECTION, SOLUTION INTRAVENOUS CONTINUOUS
Status: DISCONTINUED | OUTPATIENT
Start: 2023-12-08 | End: 2023-12-09

## 2023-12-08 RX ORDER — AMLODIPINE BESYLATE 5 MG/1
5 TABLET ORAL
Status: DISCONTINUED | OUTPATIENT
Start: 2023-12-08 | End: 2023-12-10

## 2023-12-08 RX ORDER — ATENOLOL 50 MG/1
50 TABLET ORAL
Status: DISCONTINUED | OUTPATIENT
Start: 2023-12-08 | End: 2023-12-14 | Stop reason: HOSPADM

## 2023-12-08 RX ADMIN — Medication 10 ML: at 21:00

## 2023-12-08 RX ADMIN — DEXTROSE MONOHYDRATE 100 ML/HR: 50 INJECTION, SOLUTION INTRAVENOUS at 16:35

## 2023-12-08 RX ADMIN — ENOXAPARIN SODIUM 30 MG: 100 INJECTION SUBCUTANEOUS at 16:01

## 2023-12-08 RX ADMIN — AMLODIPINE BESYLATE 5 MG: 5 TABLET ORAL at 16:02

## 2023-12-08 RX ADMIN — ASPIRIN 81 MG CHEWABLE TABLET 81 MG: 81 TABLET CHEWABLE at 16:01

## 2023-12-08 RX ADMIN — CEFTRIAXONE 1000 MG: 1 INJECTION, POWDER, FOR SOLUTION INTRAMUSCULAR; INTRAVENOUS at 15:54

## 2023-12-08 RX ADMIN — ATENOLOL 50 MG: 50 TABLET ORAL at 16:02

## 2023-12-08 RX ADMIN — Medication 10 ML: at 17:12

## 2023-12-08 NOTE — DISCHARGE PLACEMENT REQUEST
"Kenia Vasquez (83 y.o. Female)       Date of Birth   1940    Social Security Number       Address   [address] [place] [address] [place] [address]    Home Phone   [phone]    MRN   [id number]       Zoroastrian   None    Marital Status   Single                            Admission Date   12/7/23    Admission Type   Emergency    Admitting Provider   Gisela Dumas MD    Attending Provider   Gisela Dumas MD    Department, Room/Bed   [facility] EMERGENCY DEPARTMENT, 24/24       Discharge Date       Discharge Disposition       Discharge Destination                                 Attending Provider: Gisela Dumas MD    Allergies: No Known Allergies    Isolation: None   Infection: None   Code Status: No CPR    Ht: 154.9 cm (61\")   Wt: 65.8 kg (145 lb)    Admission Cmt: None   Principal Problem: URSULA (acute kidney injury) [N17.9]                   Active Insurance as of 12/7/2023       Primary Coverage       Payor Plan Insurance Group Employer/Plan Group    MEDICARE MEDICARE A & B        Payor Plan Address Payor Plan Phone Number Payor Plan Fax Number Effective Dates    [address] [phone]  8/1/2013 - None Entered    [facility] [id number]         Subscriber Name Subscriber Birth Date Member ID       KENIA VASQUEZ 1940 [id number]               Secondary Coverage       Payor Plan Insurance Group Employer/Plan Group    [place]        Payor Plan Address Payor Plan Phone Number Payor Plan Fax Number Effective Dates    [address]   11/24/2019 - None Entered    [place] [address]         Subscriber Name Subscriber Birth Date Member ID       KENIA VASQUEZ 1940 [id number]                     Emergency Contacts        (Rel.) Home Phone Work Phone Mobile Phone    JULIO GHOTRA (Grandchild) [phone] -- --                "

## 2023-12-08 NOTE — THERAPY EVALUATION
"Patient Name: Maddison Vasquez  : 1940    MRN: [id number]                              Today's Date: 2023       Admit Date: 2023    Visit Dx:     ICD-10-CM ICD-9-CM   1. Acute renal failure, unspecified acute renal failure type  N17.9 584.9   2. UTI (urinary tract infection), uncomplicated  N39.0 599.0   3. Altered mental status, unspecified altered mental status type  R41.82 780.97   4. Hypernatremia  E87.0 276.0   5. Hyperkalemia  E87.5 276.7     Patient Active Problem List   Diagnosis    Hypertension    Seizures    Dementia    Lung nodule < 6cm on CT    Thoracic spine fracture    Hyponatremia    Anemia    Head contusion    Closed fracture of proximal end of left humerus    Closed Colles' fracture of left radius    Fall    URSULA (acute kidney injury)     Past Medical History:   Diagnosis Date    Dementia     Hearing loss     Hypertension     Normochromic normocytic anemia     Seizures     Stage III chronic kidney disease      Past Surgical History:   Procedure Laterality Date    BACK SURGERY      BRAIN SURGERY      ORIF HUMERUS FRACTURE Left 2022    Procedure: HUMERUS PROXIMAL OPEN REDUCTION INTERNAL FIXATION;  Surgeon: Bulmaro Wallace MD;  Location: [facility] OR;  Service: Orthopedics;  Laterality: Left;    ORIF WRIST FRACTURE Left 2022    Procedure: WRIST OPEN REDUCTION INTERNAL FIXATION;  Surgeon: Bulmaro Wallace MD;  Location: [facility] OR;  Service: Orthopedics;  Laterality: Left;      General Information       Row Name 23 1645          Physical Therapy Time and Intention    Document Type evaluation  -SS     Mode of Treatment physical therapy  -SS       Row Name 23 1645          General Information    Patient Profile Reviewed yes  -SS     Prior Level of Function --  per facility staff, 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 She changes own clothes 2-3X throughout the day until a couple of weeks ago. Feeds self, " "sometimes toilets self w/o asst.  -     Existing Precautions/Restrictions fall;spinal  -     Barriers to Rehab medically complex;previous functional deficit;cognitive status  -       Row Name 12/08/23 1645          Living Environment    People in Home facility resident  from memory care  -       Row Name 12/08/23 1645          Cognition    Orientation Status (Cognition) disoriented to;person;place;situation;time  -       Row Name 12/08/23 1645          Safety Issues, Functional Mobility    Impairments Affecting Function (Mobility) balance;cognition;coordination;endurance/activity tolerance;grasp;motor control;muscle tone abnormal;pain;postural/trunk control;range of motion (ROM);strength  -               User Key  (r) = Recorded By, (t) = Taken By, (c) = Cosigned By      Initials Name Provider Type     Lolita Ernst PT Physical Therapist                   Mobility       Row Name 12/08/23 1646          Bed Mobility    All Activities, [place] (Bed Mobility) dependent (less than 25% patient effort)  -     Comment, (Bed Mobility) patient is assisted to be repositioned in bed, scooted superiorly but becomes agitated when attempted to assist her to sit edge of bed, yelling \"no!\"  -       Row Name 12/08/23 1646          Transfers    Comment, (Transfers) not able to progress to transfers  -       Row Name 12/08/23 1646          Gait/Stairs (Locomotion)    Comment, (Gait/Stairs) not able to advance to ambulation  -               User Key  (r) = Recorded By, (t) = Taken By, (c) = Cosigned By      Initials Name Provider Type    Lolita Mary PT Physical Therapist                   Obj/Interventions       Row Name 12/08/23 1649          Range of Motion Comprehensive    Comment, General Range of Motion no AROM in L UE or LE during evaluation; R UE and LE WFL  -       Row Name 12/08/23 1649          Strength Comprehensive (MMT)    Comment, General Manual Muscle Testing (MMT) " Assessment LUE and LE 0/5 per limited exam without command following; R UE and LE >3/5  -       Row Name 12/08/23 1649          Balance    Comment, Balance not able to assess sitting and standing balance due to agitation  -       Row Name 12/08/23 1649          Sensory Assessment (Somatosensory)    Sensory Assessment (Somatosensory) unable/difficult to assess  -               User Key  (r) = Recorded By, (t) = Taken By, (c) = Cosigned By      Initials Name Provider Type     Lolita Ernst, PT Physical Therapist                   Goals/Plan       Row Name 12/08/23 1651          Bed Mobility Goal 1 (PT)    Activity/Assistive Device (Bed Mobility Goal 1, PT) bed mobility activities, all  -SS     [place] Level/Cues Needed (Bed Mobility Goal 1, PT) modified independence  -SS     Time Frame (Bed Mobility Goal 1, PT) long term goal (LTG);2 weeks  -       Row Name 12/08/23 1651          Transfer Goal 1 (PT)    Activity/Assistive Device (Transfer Goal 1, PT) transfers, all  -SS     [place] Level/Cues Needed (Transfer Goal 1, PT) contact guard required  -SS     Time Frame (Transfer Goal 1, PT) long term goal (LTG);2 weeks  -       Row Name 12/08/23 1651          Gait Training Goal 1 (PT)    Activity/Assistive Device (Gait Training Goal 1, PT) gait (walking locomotion)  -SS     [place] Level (Gait Training Goal 1, PT) minimum assist (75% or more patient effort)  -SS     Distance (Gait Training Goal 1, PT) 25'  -SS     Time Frame (Gait Training Goal 1, PT) long term goal (LTG);2 weeks  -       Row Name 12/08/23 1651          Therapy Assessment/Plan (PT)    Planned Therapy Interventions (PT) balance training;bed mobility training;gait training;home exercise program;transfer training;strengthening;patient/family education  -               User Key  (r) = Recorded By, (t) = Taken By, (c) = Cosigned By      Initials Name Provider Type    Lolita Mary PT Physical Therapist               "     Clinical Impression       Row Name 12/08/23 1651          Pain Scale: FACES Pre/Post-Treatment    Pain: FACES Scale, Pretreatment 8-->hurts whole lot  -SS     Posttreatment Pain Rating 8-->hurts whole lot  -SS       Row Name 12/08/23 1701 12/08/23 1651       Plan of Care Review    Plan of Care Reviewed With -- patient  -SS    Outcome Evaluation 84 y/o F who presented with pill pocketing, difficulty swallowing and new left sided deficit. Patient is from [facility]. At baseline she is ambulatory independently without AD but does have a history of dementia. Symptoms of L sided weakness and falls began 1 week ago. Per neurology, patient is not an MRI candidate. Imaging (+) for T8, T12, L5 compression fractures. Patient has multiple abrasions from repeated falls over the last week. CT (-) for acute abnormality. Patient is agitated quickly with handling and interaction. She yells \"no!\" Related to any attempt to progress to sitting edge of bed. She appears very painful. She is restless with R UE and LE but does not move L UE and LE. Demos acute weakness/flaccidity of L side. Patient appears to be functioning significantly below her baseline. Will benefit from pain meds prior to PT treatment. Recommending SNF at d/c.  - --      Row Name 12/08/23 1651          Therapy Assessment/Plan (PT)    Rehab Potential (PT) fair, will monitor progress closely  -     Criteria for Skilled Interventions Met (PT) yes;meets criteria  -     Therapy Frequency (PT) 3 times/wk  -     Predicted Duration of Therapy Intervention (PT) until dc  -SS       Row Name 12/08/23 1651          Positioning and Restraints    Pre-Treatment Position in bed  -SS     Post Treatment Position bed  -SS     In Bed exit alarm on  -SS               User Key  (r) = Recorded By, (t) = Taken By, (c) = Cosigned By      Initials Name Provider Type    SS Lolita Ernst, PT Physical Therapist                   Outcome Measures       Row Name 12/08/23 " 1652 12/08/23 1124       Modified [place] Scale    Pre-Stroke Modified [place] Scale 6 - Unable to determine (UTD) from the medical record documentation  -SS 3 - Moderate disability.  Requiring some help, but able to walk without assistance.  -    Modified [place] Scale 5 - Severe disability.  Bedridden, incontinent, and requiring constant nursing care and attention.  -SS 5 - Severe disability.  Bedridden, incontinent, and requiring constant nursing care and attention.  -      Row Name 12/08/23 1652 12/08/23 1124       Functional Assessment    Outcome Measure Options Modified [place]  -SS Modified [place]  -              User Key  (r) = Recorded By, (t) = Taken By, (c) = Cosigned By      Initials Name Provider Type     Lolita Ernst, PT Physical Therapist    Cinthya Zaragoza, OT Occupational Therapist                                 Physical Therapy Education       Title: PT OT SLP Therapies (In Progress)       Topic: Physical Therapy (Not Started)       Point: Mobility training (In Progress)       Learning Progress Summary             Patient Nonacceptance, E, NL by  at 12/8/2023 1653                         Point: Precautions (Not Started)       Learner Progress:  Not documented in this visit.                              User Key       Initials Effective Dates Name Provider Type Discipline     06/16/21 -  Lolita Ernst, PT Physical Therapist PT                  PT Recommendation and Plan  Planned Therapy Interventions (PT): balance training, bed mobility training, gait training, home exercise program, transfer training, strengthening, patient/family education  Plan of Care Reviewed With: patient  Outcome Evaluation: 84 y/o F who presented with pill pocketing, difficulty swallowing and new left sided deficit. Patient is from [facility]. At baseline she is ambulatory independently without AD but does have a history of dementia. Symptoms of L sided weakness and falls began 1 week ago. Per  "neurology, patient is not an MRI candidate. Imaging (+) for T8, T12, L5 compression fractures. Patient has multiple abrasions from repeated falls over the last week. CT (-) for acute abnormality. Patient is agitated quickly with handling and interaction. She yells \"no!\" Related to any attempt to progress to sitting edge of bed. She appears very painful. She is restless with R UE and LE but does not move L UE and LE. Demos acute weakness/flaccidity of L side. Patient appears to be functioning significantly below her baseline. Will benefit from pain meds prior to PT treatment. Recommending SNF at d/c.     Time Calculation:   PT Evaluation Complexity  History, PT Evaluation Complexity: 3 or more personal factors and/or comorbidities  Examination of Body Systems (PT Eval Complexity): total of 3 or more elements  Clinical Presentation (PT Evaluation Complexity): evolving  Clinical Decision Making (PT Evaluation Complexity): moderate complexity  Overall Complexity (PT Evaluation Complexity): moderate complexity     PT Charges       Row Name 12/08/23 1653             Time Calculation    Start Time 0941  -      Stop Time 0951  -      Time Calculation (min) 10 min  -      PT Received On 12/08/23  -      PT - Next Appointment 12/11/23  -      PT Goal Re-Cert Due Date 12/22/23  -         Time Calculation- PT    Total Timed Code Minutes- PT 0 minute(s)  -                User Key  (r) = Recorded By, (t) = Taken By, (c) = Cosigned By      Initials Name Provider Type     Lolita Ernst, PT Physical Therapist                  Therapy Charges for Today       Code Description Service Date Service Provider Modifiers Qty    [id number]  PT EVAL MOD COMPLEXITY 3 12/8/2023 Lolita Ernst, PT GP 1            PT G-Codes  Outcome Measure Options: Modified [place]  AM-PAC 6 Clicks Score (PT): 7  Modified [place] Scale: 5 - Severe disability.  Bedridden, incontinent, and requiring constant nursing care and " attention.  PT Discharge Summary  Anticipated Discharge Disposition (PT): skilled nursing facility    Lolita Ernst, PT  12/8/2023

## 2023-12-08 NOTE — CONSULTS
"Diabetes Education  Assessment/Teaching    Patient Name:  Maddison Vasquez  YOB: 1940  MRN: [id number]  Admit Date:  12/7/2023      Assessment Date:  12/8/2023  Flowsheet Row Most Recent Value   General Information     Height 154.9 cm (61\")   Height Method Estimated   Weight 65.8 kg (145 lb)   Weight Method Estimated   Pregnancy Assessment    Diabetes History    Education Preferences    Nutrition Information    Assessment Topics    DM Goals                   Other Comments:  MD consult per stroke protocol. On 12/8/2023 A1c was 6.0% with no history of diabetes. Patient confused and not appropriate for education. Patient from extended care facility where staff administers meds and treatment. A1c sheet with A1c result written and pre-diabetes range along with pre-diabetes handout.         Electronically signed by:  Kalli Chavez RN  12/08/23 15:23 EST    "

## 2023-12-08 NOTE — PLAN OF CARE
Goal Outcome Evaluation:   Pt was seen for clinical swallow eval. Pt was awake and responsive, but agitated and needing max cues to participate in swallow eval. She was unable to answer any questions or follow simple commands. Pt needed frequent redirection. Pt was given trials of water by spoon and straw and applesauce only. Solids not trialed due to pt being agitated and having no teeth or dentures. He exhibited adequate labial closure over the spoon and straw. Pt had no difficulty pulling liquid from the straw. Oral transit was slow. There was no pocketing or oral residual. Digital palpation of swallow suggests timely initiation. After the swallow, pt had clear vocal quality, and no cough or other overt s/s of aspiration.     It is rec that pt initiate a puree diet with thin liquids. Pt should be awake and alert for all PO. Monitor closely for s/s of aspiration and make NPO if pt shows any s/s. Pt should be a full feed and be fed at a slow rate. ST will follow to assure tolerance of diet and make upgrades as pt improves.

## 2023-12-08 NOTE — CONSULTS
Primary Care Provider: No ref. provider found     Consult requested by:  Dr Dumas    Reason for Consultation: Neurological evaluation /mental status changes and weakness    History taken from: chart RN    Chief complaint: Change in mental status       SUBJECTIVE:    History of present illness: Background per H&P: Maddison Vasquez is a 83 y.o. year old female who was evaluated in room 24 in the ER at [facility]    Source of information is mostly the medical records    I have seen this patient in 2021 for something similar    The patient is an 83-year-old female with history of dementia, looks like multiple strokes in the past based on the CT, looks like multiple aneurysm repair in the past and did have craniotomy in the past.  Previously she was oriented to herself but did cooperate more than what I am seeing right now.  Now very agitated and angry and trying to hit people.  There is asymmetry with left-sided weakness.  I tried to call her granddaughter but I could not get connected.    She cannot have an MRI    Looking at the CT there is significant atrophy and bifrontal changes    Previously she was seen for falls and now she has falls and UTI and renal issues  BUN of 18 creatinine of 1.99  She is anemic also    I really do not know her baseline but not much better than this    She is clearly not TNK or intervention candidate and she is a DNR so I am uncertain, because it does not look like LVO, what CTA be of any value  The best approach would be to talk to family but unfortunately her granddaughter could not be connected with    She has weakness, left greater than right        As per admitting,  Maddison Vasquez is a 83 y.o. female patient in skilled nursing facility  Patient had couple fall event on Saturday  Seen in the ER here and was discharged back to [facility] facility she had another fall patient was periorbital hematoma  Patient brought here with change mental status her CT was  essentially negative in the ER  Patient was found to be hypernatremic with hyperkalemia and acute kidney insufficiency with UTI  Patient seen with granddaughter at bedside  Patient is not giving any details she keeps crying saying no for an answer for all questions  There is marked weakness on the left side she is not able to move her left thigh and left arm  There is some swelling noted on the left leg     Review of Systems              Unobtainable from the patient due to mental status        - Portions of the above HPI were copied from previous encounters and edited as appropriate. PMH as detailed below.     Review of Systems   Review of systems not possible    PATIENT HISTORY:  Past Medical History:   Diagnosis Date    Dementia     Hearing loss     Hypertension     Normochromic normocytic anemia     Seizures     Stage III chronic kidney disease    ,   Past Surgical History:   Procedure Laterality Date    BACK SURGERY      BRAIN SURGERY      ORIF HUMERUS FRACTURE Left 1/28/2022    Procedure: HUMERUS PROXIMAL OPEN REDUCTION INTERNAL FIXATION;  Surgeon: Bulmaro Wallace MD;  Location: [facility];  Service: Orthopedics;  Laterality: Left;    ORIF WRIST FRACTURE Left 1/28/2022    Procedure: WRIST OPEN REDUCTION INTERNAL FIXATION;  Surgeon: Bulmaro Wallace MD;  Location: [facility];  Service: Orthopedics;  Laterality: Left;   ,   Family History   Problem Relation Age of Onset    No Known Problems Mother     No Known Problems Father    ,   Social History     Tobacco Use    Smoking status: Light Smoker     Packs/day: .25     Types: Cigarettes     Passive exposure: Past    Smokeless tobacco: Never   Vaping Use    Vaping Use: Never used   Substance Use Topics    Alcohol use: Never    Drug use: Never   ,   Prior to Admission medications    Medication Sig Start Date End Date Taking? Authorizing Provider   acetaminophen (TYLENOL) 325 MG tablet Take 2 tablets by mouth Every 6 (Six) Hours As Needed for Mild Pain.   Yes  Nikole Ahn MD   amLODIPine (NORVASC) 10 MG tablet Take 1 tablet by mouth Every Evening.   Yes Nikole Ahn MD   atenolol (TENORMIN) 50 MG tablet Take 1 tablet by mouth Every Morning.   Yes Nikole Ahn MD   bisacodyl (DULCOLAX) 10 MG suppository Insert 1 suppository into the rectum Daily As Needed for Constipation.   Yes Nikole Ahn MD   carBAMazepine (TEGretol) 100 MG chewable tablet Chew 1 tablet 2 (Two) Times a Day.   Yes Nikole Ahn MD   Divalproex Sodium (DEPAKOTE SPRINKLE) 125 MG capsule Take 2 capsules by mouth 3 times a day.   Yes Nikole Ahn MD   ferrous sulfate 325 (65 FE) MG tablet Take 1 tablet by mouth Daily With Breakfast.   Yes Nikole Ahn MD   Fluticasone Furoate-Vilanterol (Breo Ellipta) 100-25 MCG/INH inhaler Inhale 1 puff Daily.   Yes Nikole Ahn MD   furosemide (LASIX) 20 MG tablet Take 1 tablet by mouth Daily.   Yes Nikole Ahn MD   magnesium hydroxide (MILK OF MAGNESIA) 400 MG/5ML suspension Take 30 mL by mouth Daily As Needed for Constipation.   Yes Nikole Ahn MD   melatonin 3 MG tablet Take 1 tablet by mouth Daily.   Yes Nikole Ahn MD   potassium chloride 10 MEQ CR tablet Take 1 tablet by mouth Daily.   Yes Nikole Ahn MD   sertraline (ZOLOFT) 25 MG tablet Take 1 tablet by mouth Daily.   Yes Nikole Ahn MD   Sodium Phosphates (fleet enema) 7-19 GM/118ML enema Insert 1 enema into the rectum 1 (One) Time As Needed.   Yes Nikole Ahn MD   traMADol (ULTRAM) 50 MG tablet Take 1 tablet by mouth Every 8 (Eight) Hours As Needed for Moderate Pain.   Yes Nikole Ahn MD   vitamin D (ERGOCALCIFEROL) 1.25 MG (33152 UT) capsule capsule Take 1 capsule by mouth 1 (One) Time Per Week. Tues   Yes Nikole Ahn MD    Allergies:  Patient has no known allergies.    Current Facility-Administered Medications   Medication Dose Route Frequency Provider Last  Rate Last Admin    aspirin chewable tablet 81 mg  81 mg Oral Daily Gisela Dumas MD        Or    aspirin suppository 300 mg  300 mg Rectal Daily Gisela Dumas MD   300 mg at 12/07/23 1857    atorvastatin (LIPITOR) tablet 80 mg  80 mg Oral Nightly Gisela Dumas MD        cefTRIAXone (ROCEPHIN) 1,000 mg in sodium chloride 0.9 % 100 mL IVPB  1,000 mg Intravenous Q24H Gisela Dumas MD        Enoxaparin Sodium (LOVENOX) syringe 30 mg  30 mg Subcutaneous Daily Gisela Dumas MD   30 mg at 12/07/23 1857    sodium chloride 0.9 % flush 10 mL  10 mL Intravenous Q12H Gisela Dumas MD   10 mL at 12/07/23 2011    sodium chloride 0.9 % flush 10 mL  10 mL Intravenous PRN Gisela Dumas MD        sodium chloride 0.9 % infusion 40 mL  40 mL Intravenous PRN Gisela Dumas MD         Current Outpatient Medications   Medication Sig Dispense Refill    acetaminophen (TYLENOL) 325 MG tablet Take 2 tablets by mouth Every 6 (Six) Hours As Needed for Mild Pain.      amLODIPine (NORVASC) 10 MG tablet Take 1 tablet by mouth Every Evening.      atenolol (TENORMIN) 50 MG tablet Take 1 tablet by mouth Every Morning.      bisacodyl (DULCOLAX) 10 MG suppository Insert 1 suppository into the rectum Daily As Needed for Constipation.      carBAMazepine (TEGretol) 100 MG chewable tablet Chew 1 tablet 2 (Two) Times a Day.      Divalproex Sodium (DEPAKOTE SPRINKLE) 125 MG capsule Take 2 capsules by mouth 3 times a day.      ferrous sulfate 325 (65 FE) MG tablet Take 1 tablet by mouth Daily With Breakfast.      Fluticasone Furoate-Vilanterol (Breo Ellipta) 100-25 MCG/INH inhaler Inhale 1 puff Daily.      furosemide (LASIX) 20 MG tablet Take 1 tablet by mouth Daily.      magnesium hydroxide (MILK OF MAGNESIA) 400 MG/5ML suspension Take 30 mL by mouth Daily As Needed for Constipation.      melatonin 3 MG tablet Take 1 tablet by mouth Daily.      potassium chloride 10 MEQ CR tablet Take 1 tablet by mouth Daily.      sertraline (ZOLOFT)  25 MG tablet Take 1 tablet by mouth Daily.      Sodium Phosphates (fleet enema) 7-19 GM/118ML enema Insert 1 enema into the rectum 1 (One) Time As Needed.      traMADol (ULTRAM) 50 MG tablet Take 1 tablet by mouth Every 8 (Eight) Hours As Needed for Moderate Pain.      vitamin D (ERGOCALCIFEROL) 1.25 MG (68046 UT) capsule capsule Take 1 capsule by mouth 1 (One) Time Per Week. Tues          ________________________________________________________        OBJECTIVE:  Upon today's exam, patient is very agitated, delirious and trying to hit people but does not look like an acute respiratory or circulatory distress     Neurologic Exam  Neurological examination very limited  She can tell me her name  She follows some very simple commands but mostly on the right side by moving and squeezing my fingers etc. but likely more reflex than anything  She is not oriented to time or place    Naming is also very questionable, this may be encephalopathy or this may be lack of cooperation    On cranial nerve screening, she did respond to threat in the primary gaze, no roving eye movements, no forceful eye deviation, no ptosis or nystagmus.    Sensation of his scalp is present and I do not see any significant facial asymmetry    Hearing seem to be intact    Head and neck turning was spontaneous no stiffness was seen and no signs of meningismus     All motor examination she is moving the right side more than the left.  The right side is at least 4+ on the left side seem to be at least 3+ to 4 -    On since examination he responds to pain and withdraws    I could not get any reflexes and toes are upgoing definitely on the left side and questionably on the right side also    Gait and coordination could not be evaluated  ________________________________________________________   RESULTS REVIEW:    VITAL SIGNS:   Temp:  [99.2 °F (37.3 °C)] 99.2 °F (37.3 °C)  Heart Rate:  [] 103  Resp:  [20] 20  BP: (110-165)/() 165/79     LABS:       Lab 12/08/23  1130 12/07/23  1359 12/07/23  1355   WBC 10.30  --  11.00*   HEMOGLOBIN 8.9*  --  9.0*   HEMATOCRIT 27.8*  --  28.1*   PLATELETS 351  --  358   NEUTROS ABS 7.60*  --  8.30*   LYMPHS ABS 1.70  --  1.40   MONOS ABS 0.90  --  1.20*   EOS ABS 0.00  --  0.10   .5*  --  99.7*   LACTATE 1.7 1.7  --          Lab 12/08/23  1130 12/07/23  1355   SODIUM 158* 152*   POTASSIUM 4.8 5.8*   CHLORIDE 124* 119*   CO2 20.0* 19.0*   ANION GAP 14.0 14.0   BUN 85* 85*   CREATININE 1.99* 2.00*   EGFR 24.5* 24.4*   GLUCOSE 180* 175*   CALCIUM 9.4 9.6   HEMOGLOBIN A1C 6.00*  --          Lab 12/07/23  1355   TOTAL PROTEIN 8.1   ALBUMIN 3.5   GLOBULIN 4.6   ALT (SGPT) 35*   AST (SGOT) 27   BILIRUBIN 0.2   ALK PHOS 123*         Lab 12/08/23  1130   HSTROP T 41*         Lab 12/08/23  1130   CHOLESTEROL 110   LDL CHOL 58   HDL CHOL 28*   TRIGLYCERIDES 135             UA          12/7/2023    13:55   Urinalysis   Squamous Epithelial Cells, UA None Seen    Specific Gravity, UA 1.012    Ketones, UA Negative    Blood, UA Large (3+)    Leukocytes, UA Large (3+)    Nitrite, UA Negative    RBC, UA Too Numerous to Count    WBC, UA Too Numerous to Count    Bacteria, UA 1+        Lab Results   Component Value Date    TSH 2.080 10/10/2021    LDL 58 12/08/2023    HGBA1C 6.00 (H) 12/08/2023    [id number] 153 (L) 12/17/2020    CBMZ 7.6 01/27/2022       IMAGING STUDIES:  XR Spine Lumbar 2 or 3 View    Result Date: 12/7/2023  Impression: 1. Age-indeterminate moderate compression fracture at T12. 2. Question compression fracture at L5, the degree of severe osteopenia limits complete assessment. Lumbar spine CT may be helpful to further assess 3. Compression fracture at T8 similar to prior chest CT. 4. Lumbar degenerative findings above. Electronically Signed: Daniel Woods MD  12/7/2023 11:59 PM EST  Workstation ID: [id number]    XR Hip With or Without Pelvis 2 - 3 View Left    Result Date: 12/7/2023  Impression: Allowing for bony  demineralization, no evidence of acute displaced fracture. Electronically Signed: Marcel Kenny MD  12/7/2023 8:10 PM EST  Workstation ID: [id number]    XR Shoulder 2+ View Left    Result Date: 12/7/2023  Impression: No acute fracture or malalignment. Chronic fracture deformity of the proximal humerus with unremarkable appearance of ORIF hardware. Electronically Signed: Marcel Kenny MD  12/7/2023 7:57 PM EST  Workstation ID: [id number]    CT Head Without Contrast    Result Date: 12/7/2023  Impression: Somewhat limited study. Extensive chronic findings. No acute process identified. Electronically Signed: Oliva Curtis MD  12/7/2023 2:24 PM EST  Workstation ID: [id number]    XR Chest 1 View    Result Date: 12/7/2023  Impression: 1.No acute process identified. Electronically Signed: Tim Coon MD  12/7/2023 1:03 PM CST  Workstation ID: [id number]     I reviewed the patient's new clinical results.    ________________________________________________________     PROBLEM LIST:    URSULA (acute kidney injury)            ASSESSMENT/PLAN:  This is an 83-year-old female with prior history of trauma and aneurysm clipping and multiple strokes and frontal injuries      She looks like she is having multifactorial toxic and metabolic encephalopathy with history of UTI now and also renal issues electrolyte abnormality    She has dementia    She has immobilization    She has weakness    She is not a CTA candidate  She is not an MRI candidate    She does have medical issues    So per neurology I will focus on the UTI and rehabilitation as she is not a TNK or intervention candidate anyway    Does not look like seizures but is on carbamazepine and valproic acid anyway    So she is essentially being treated aggressively.    It looks like she has had hemorrhage in the past so she is not on aspirin or Plavix or anticoagulation    So it does not look like any other neurologic workup would change course of action or long-term  prognosis      Call if needed      I discussed the patient's findings and my recommendations with patient and nursing staff    Arnie Torres MD  12/08/23  12:49 EST

## 2023-12-08 NOTE — PROGRESS NOTES
Expand All Collapse [facility]        Patient Name: Maddison Vasquez  : 1940  MRN: [id number]  Primary Care Physician:  Provider, No Known  Date of admission: 2023        Subjective   Subjective      Chief Complaint: Patient sent from the [facility] care facility with change mental status  Patient with left-sided weakness  Patient is poor historian history obtained from record  Patient found with hyponatremia and acute kidney insufficiency with hyperkalemia and UTI     HPI:     Maddison Vasquez is a 83 y.o. female patient in skilled nursing facility  Patient had couple fall event on Saturday  Seen in the ER here [facility] she had another fall patient was periorbital hematoma  Patient brought here with change mental status her CT was essentially negative in the ER  Patient was found to be hypernatremic with hyperkalemia and acute kidney insufficiency with UTI  Patient seen with granddaughter at bedside  Patient is not giving any details she keeps crying saying no for an answer for all questions  There is marked weakness on the left side she is not able to move her left thigh and left arm  There is some swelling noted on the left leg  Hypernatalemia resolved ,last cr 1.99     Review of Systems              Unobtainable from the patient due to mental status      Patient is lethargic  Was agitated overnight  Venous Doppler of the lower extremities pending  Patient is hyponatremic and hyperkalemic on admission with acute kidney insufficiency.  Per nephrology  CT of the brain was negative on admission  Neurology consult still pending  Patient on left-sided hemiparesis not moving her left side except for withdrawal to painful stimuli    Hgb stable at 8.9,anemia of chronic disease  Personal History      Medical History        Past Medical History:   Diagnosis Date    Dementia      Hearing loss      Hypertension      Normochromic normocytic anemia       Seizures      Stage III chronic kidney disease              Surgical History         Past Surgical History:   Procedure Laterality Date    BACK SURGERY        BRAIN SURGERY        ORIF HUMERUS FRACTURE Left 1/28/2022     Procedure: HUMERUS PROXIMAL OPEN REDUCTION INTERNAL FIXATION;  Surgeon: Bulmaro Wallace MD;  Location: [facility] MAIN OR;  Service: Orthopedics;  Laterality: Left;    ORIF WRIST FRACTURE Left 1/28/2022     Procedure: WRIST OPEN REDUCTION INTERNAL FIXATION;  Surgeon: Bulmaro Wallace MD;  Location: [facility] MAIN OR;  Service: Orthopedics;  Laterality: Left;            Family History: family history includes No Known Problems in her father and mother. Otherwise pertinent FHx was reviewed and not pertinent to current issue.     Social History:  reports that she has been smoking. She has been smoking an average of .25 packs per day. She has never used smokeless tobacco. She reports that she does not drink alcohol and does not use drugs.     Home Medications:  Divalproex Sodium, Fluticasone Furoate-Vilanterol, acetaminophen, amLODIPine, atenolol, bisacodyl, carBAMazepine, ferrous sulfate, fleet enema, furosemide, magnesium hydroxide, melatonin, potassium chloride, sertraline, traMADol, and vitamin D     Allergies:  No Known Allergies           Objective   Objective      Vitals:   Temp:  [99 °F (37.2 °C)] 99 °F (37.2 °C)  Heart Rate:  [90-97] 90  Resp:  [16-24] 24  BP: (145-159)/(62-80) 149/79  Physical Exam                         Constitutional: Patient is awake confused not answering any question except for saying no              Eyes: PERRLA, sclerae anicteric, no conjunctival injection              HENT: NCAT, right mucous membrane with periorbital hematoma on around the right due to the previous fall              Neck: Supple, no thyromegaly, no lymphadenopathy, trachea midline              Respiratory: Clear to auscultation bilaterally, nonlabored respirations               Cardiovascular: RRR, no  murmurs, rubs, or gallops, palpable pedal pulses bilaterally              Gastrointestinal: Positive bowel sounds, soft, nontender, nondistended              Musculoskeletal: No bilateral ankle edema, no clubbing or cyanosis to extremities              Neuro she is awake agitated very confused  Following simple commands on the right side but not moving her left side  But as per EMS she was earlier on moving her left side when she was fighting the EMS           Result Review   Result Review:  I have personally reviewed the results from the time of this admission to 12/7/2023 16:13 EST and agree with these findings:  []  Laboratory list / accordion  []  Microbiology  []  Radiology  []  EKG/Telemetry   []  Cardiology/Vascular   []  Pathology  []  Old records  []  Other:  Most notable findings include:               Assessment & Plan  Assessment / Plan      Brief Patient Summary:  Maddison Vasquez is a 83 y.o. female who came here with change mental status  Patient is confused agitated  She is not able to move her left side  Her initial CAT scan was negative  Patient with UTI and advanced dementia  Patient with hypernatremia and hyperkalemia and acute kidney insufficiency  Per granddaughter patient was functional couple months ago  Xray hip and shoulder no fx,lumbar spine with t8 and t12 and l5 compression fx looks chronic        Active Hospital Problems:       Active Hospital Problems     Diagnosis      **URSULA (acute kidney injury)        Plan:   Discussed CODE STATUS with granddaughter and we agreed on DNR but full active treatment\  Start on IV antibiotic for UTI will continue IV Rocephin  Will check x-ray of the left hip and the lumbar spine and the left arm and a venous Doppler of the left leg  Keep patient on a monitored bed  DNR status  VTE prophylaxis  Hydration IV fluids and renal consult due to her acute kidney insufficiency and hyponatremia and hyperkalemia  Neuroconsult will be ordered  Prognosis is  guarded     DVT prophylaxis:  Medical and mechanical DVT prophylaxis orders are present.     CODE STATUS:    Level Of Support Discussed With: Health Care Surrogate  Code Status (Patient has no pulse and is not breathing): No CPR (Do Not Attempt to Resuscitate)  Medical Interventions (Patient has pulse or is breathing): Full Support     Admission Status:  I believe this patient meets ip status.     Gisela Dumas MD

## 2023-12-08 NOTE — PLAN OF CARE
"Goal Outcome Evaluation:  Plan of Care Reviewed With: patient        Progress: declining  Outcome Evaluation: Pt is 84 y/o in memory care who is normally otiented to self and at her facility walks a lot w/o DME. Staff report she walks short distances then stops to rest holding to rails, counters, and that she does this throughout the day. They report that until a couple of weeks ago, although she required assist for all ADL she could feed herself with supervision, participate in grooming, at times she would take herself to the bathroom, and would change her clothes frequently during the day. Pt was admitted last weekend after a couple of falls with right periorbital hematoma and forehead laceration. She was not founf to have any TBI or CVA and was d/c back to [facility] care. She fell again and was admitted, this time with a very significant decline. She has undergone more imaging and is found to have T8 and possible T5 comp. fx and at T12 is age-indeterminate and moderate in severity. Also, she is now demonstrating left-sided hemiparesis and a change in her communication. She is only repeating \"No\". She is not moving her LUE and the hand appears flacid though proximally she has some tone. Her LLE is not moving unless in an acute reflexive withdrawl to painful stimulus. She moves her right side but is not able to be functional in any way with self care. She is painful and inappropriate to attempt mobility this date. Recommend SNF at d/c.      Anticipated Discharge Disposition (OT): skilled nursing facility  "

## 2023-12-08 NOTE — CASE MANAGEMENT/SOCIAL WORK
Discharge Planning Assessment   Eugenio     Patient Name: Maddison Vasquez  MRN: [id number]  Today's Date: 12/8/2023    Admit Date: 12/7/2023    Plan: Return to [facility], No precert or PASRR required   Discharge Needs Assessment       Row Name 12/08/23 0857       Living Environment    People in Home facility resident    Current Living Arrangements extended care facility    Potentially Unsafe Housing Conditions none    In the past 12 months has the electric, gas, oil, or water company threatened to shut off services in your home? No    Primary Care Provided by self    Provides Primary Care For no one    Family Caregiver if Needed grandchild(ashanti), adult    Family Caregiver Names Ila Earl    Quality of Family Relationships supportive    Able to Return to Prior Arrangements yes       Resource/Environmental Concerns    Resource/Environmental Concerns none       Food Insecurity    Within the past 12 months, you worried that your food would run out before you got the money to buy more. Never true    Within the past 12 months, the food you bought just didn't last and you didn't have money to get more. Never true       Transition Planning    Patient/Family Anticipates Transition to long-term care facility    Patient/Family Anticipated Services at Transition skilled nursing    Transportation Anticipated health plan transportation                   Discharge Plan       Row Name 12/08/23 0857       Plan    Plan Return to [facility], No precert or PASRR required    Patient/Family in Agreement with Plan yes    Plan Comments Called and spoke with Granddaughter. patient lives at [facility] in memory care unit. Pending patients condition at DC may be able to provide transportation if WC van or EMS not able to take. Notified Liaison Stella of admission and plan for patient to return once medically stable. DC barrierS: renal consult, IV ATB                  Continued Care and Services - Admitted Since 12/7/2023        Destination       Service Provider Request Status Selected Services Address Phone Fax Patient Preferred    [facility] [place] [facility] - Request Sent N/A [id number] [facility] [place] [address] [phone] --                  Expected Discharge Date and Time       Expected Discharge Date Expected Discharge Time    Dec 9, 2023            Demographic Summary       Row Name 12/08/23 0856       General Information    Admission Type inpatient    Arrived From emergency department    Required Notices Provided Important Message from Medicare    Referral Source admission list    Reason for Consult discharge planning    Preferred Language English                   Functional Status       Row Name 12/08/23 0856       Functional Status    Usual Activity Tolerance fair    Current Activity Tolerance fair       Functional Status, IADL    Medications completely dependent    Meal Preparation completely dependent    Housekeeping completely dependent    Laundry completely dependent    Shopping completely dependent       Mental Status    General Appearance WDL WDL       Mental Status Summary    Recent Changes in Mental Status/Cognitive Functioning no changes                   Psychosocial    No documentation.                  Abuse/Neglect    No documentation.                  Legal    No documentation.                  Substance Abuse    No documentation.                  Patient Forms       Row Name 12/08/23 0849       Patient Forms    Important Message from Medicare (IMM) --  IMM 12/7 per ananda Ramos RN     no

## 2023-12-08 NOTE — PROGRESS NOTES
RENAL/K[facility]:    Patient known to me from prior admission.  Full consult note to follow.  Need repeat labs this AM which are ordered.  Will follow.    Dmitry Lindsey M.D.  [facility]

## 2023-12-08 NOTE — THERAPY EVALUATION
Acute Care - Speech Language Pathology   Swallow Initial Evaluation  Eugenio     Patient Name: Maddison Vasquez  : 1940  MRN: [id number]  Today's Date: 2023               Admit Date: 2023    Visit Dx:     ICD-10-CM ICD-9-CM   1. Acute renal failure, unspecified acute renal failure type  N17.9 584.9   2. UTI (urinary tract infection), uncomplicated  N39.0 599.0   3. Altered mental status, unspecified altered mental status type  R41.82 780.97   4. Hypernatremia  E87.0 276.0   5. Hyperkalemia  E87.5 276.7     Patient Active Problem List   Diagnosis    Hypertension    Seizures    Dementia    Lung nodule < 6cm on CT    Thoracic spine fracture    Hyponatremia    Anemia    Head contusion    Closed fracture of proximal end of left humerus    Closed Colles' fracture of left radius    Fall    URSULA (acute kidney injury)     Past Medical History:   Diagnosis Date    Dementia     Hearing loss     Hypertension     Normochromic normocytic anemia     Seizures     Stage III chronic kidney disease      Past Surgical History:   Procedure Laterality Date    BACK SURGERY      BRAIN SURGERY      ORIF HUMERUS FRACTURE Left 2022    Procedure: HUMERUS PROXIMAL OPEN REDUCTION INTERNAL FIXATION;  Surgeon: Bulmaro Wallace MD;  Location: [facility];  Service: Orthopedics;  Laterality: Left;    ORIF WRIST FRACTURE Left 2022    Procedure: WRIST OPEN REDUCTION INTERNAL FIXATION;  Surgeon: Bulmaro Wallace MD;  Location: [facility] MAIN OR;  Service: Orthopedics;  Laterality: Left;       SLP Recommendation and Plan  SLP Swallowing Diagnosis: moderate, oral dysphagia, functional pharyngeal phase (23 1500)  SLP Diet Recommendation: puree, thin liquids (23 1500)  Recommended Precautions and Strategies: upright posture during/after eating, small bites of food and sips of liquid, alternate between small bites of food and sips of liquid, general aspiration precautions, reflux precautions, assist with feeding  "(12/08/23 1500)  SLP Rec. for Method of Medication Administration: meds whole, meds crushed, as tolerated (12/08/23 1500)     Monitor for Signs of Aspiration: yes, notify SLP if any concerns, cough, gurgly voice, throat clearing (12/08/23 1500)     Swallow Criteria for Skilled Therapeutic Interventions Met: demonstrates skilled criteria (12/08/23 1500)  Anticipated Discharge Disposition (SLP): skilled nursing facility (12/08/23 1500)  Rehab Potential/Prognosis, Swallowing: good, to achieve stated therapy goals (12/08/23 1500)  Therapy Frequency (Swallow): PRN (12/08/23 1500)  Predicted Duration Therapy Intervention (Days): until discharge (12/08/23 1500)  Oral Care Recommendations: Oral Care before breakfast, after meals and PRN (12/08/23 1500)                                      Oral Care Recommendations: Oral Care before breakfast, after meals and PRN (12/08/23 1500)           SWALLOW EVALUATION (last 72 hours)       SLP Adult Swallow Evaluation       Row Name 12/08/23 1500       Rehab Evaluation    Document Type evaluation  -CP    Subjective Information no complaints  -CP    Patient Observations alert;cooperative  Pt stating \"no\" but still cooperated.  -CP    Patient Effort good  -CP       General Information    Patient Profile Reviewed yes  -CP    Pertinent History Of Current Problem Maddison Vasquez is a 83 y.o. female patient in skilled nursing facility  Patient had couple fall event on Saturday  Seen in the ER here and was discharged back to [facility] facility she had another fall patient was periorbital hematoma  Patient brought here with change mental status her CT was essentially negative in the ER  Patient was found to be hypernatremic with hyperkalemia and acute kidney insufficiency with UTI  Patient seen with granddaughter at bedside  Patient is not giving any details she keeps crying saying no for an answer for all questions  There is marked weakness on the left side she is not able to move her " left thigh and left arm  There is some swelling noted on the left leg. Pt failed the swallow screen.  -CP    Current Method of Nutrition NPO  -CP    Prior Level of Function-Swallowing unknown  -CP    Plans/Goals Discussed with patient;other (see comments)  Pt's nurse  -CP    Barriers to Rehab cognitive status  -CP       Pain    Additional Documentation Pain Scale: FACES Pre/Post-Treatment (Group)  -CP       Pain Scale: FACES Pre/Post-Treatment    Pain: FACES Scale, Pretreatment 0-->no hurt  -CP    Posttreatment Pain Rating 0-->no hurt  -CP       Oral Motor Structure and Function    Dentition Assessment edentulous, dentures not available  -CP    Secretion Management WNL/WFL  -CP    Mucosal Quality dry;cracked  -CP       Oral Musculature and Cranial Nerve Assessment    Oral Motor General Assessment unable to assess;other (see comments)  -CP    Oral Motor, Comment Pt could not follow commands for oral mech  -CP       General Eating/Swallowing Observations    Respiratory Support Currently in Use room air  -CP    Eating/Swallowing Skills fed by SLP  -CP    Positioning During Eating upright in bed  -CP    Utensils Used spoon;straw  -CP    Consistencies Trialed thin liquids;pureed  -CP       Clinical Swallow Eval    Clinical Swallow Evaluation Summary Pt was seen for clinical swallow eval. Pt was awake and responsive, but agitated and needing max cues to participate in swallow eval. She was unable to answer any questions or follow simple commands. Pt needed frequent redirection. Pt was given trials of water by spoon and straw and applesauce only. Solids not trialed due to pt being agitated and having no teeth or dentures. He exhibited adequate labial closure over the spoon and straw. Pt had no difficulty pulling liquid from the straw. Oral transit was slow. There was no pocketing or oral residual. Digital palpation of swallow suggests timely initiation. After the swallow, pt had clear vocal quality, and no cough or other  overt s/s of aspiration. It is rec that pt initiate a puree diet with thin liquids. Pt should be awake and alert for all PO. Monitor closely for s/s of aspiration and make NPO if pt shows any s/s. Pt should be a full feed and be fed at a slow rate. ST will follow to assure tolerance of diet and make upgrades as pt improves.  -CP       SLP Evaluation Clinical Impression    SLP Swallowing Diagnosis moderate;oral dysphagia;functional pharyngeal phase  -CP    Functional Impact risk of malnutrition;risk of aspiration/pneumonia  -CP    Rehab Potential/Prognosis, Swallowing good, to achieve stated therapy goals  -CP    Swallow Criteria for Skilled Therapeutic Interventions Met demonstrates skilled criteria  -CP       SLP Treatment Clinical Impressions    Care Plan Review evaluation/treatment results reviewed  -CP       Recommendations    Therapy Frequency (Swallow) PRN  -CP    Predicted Duration Therapy Intervention (Days) until discharge  -CP    SLP Diet Recommendation puree;thin liquids  -CP    Recommended Precautions and Strategies upright posture during/after eating;small bites of food and sips of liquid;alternate between small bites of food and sips of liquid;general aspiration precautions;reflux precautions;assist with feeding  -CP    Oral Care Recommendations Oral Care before breakfast, after meals and PRN  -CP    SLP Rec. for Method of Medication Administration meds whole;meds crushed;as tolerated  -CP    Monitor for Signs of Aspiration yes;notify SLP if any concerns;cough;gurgly voice;throat clearing  -CP    Anticipated Discharge Disposition (SLP) skilled nursing facility  -CP       Swallow Goals (SLP)    Swallow LTGs Swallow Long Term Goal (free text)  -CP    Swallow STGs diet tolerance goal selection (SLP)  -CP    Diet Tolerance Goal Selection (SLP) Swallow Short Term Goal 1;Swallow Short Term Goal 2  -CP       (LTG) Swallow    (LTG) Swallow Pt will maximize swallow function for least restrictive PO diet,  exhibiting no complication associated with dysphagia, adequate PO intake, and demonstrating independent use of swallow compensations  -CP    Time Frame (Swallow Long Term Goal) by discharge  -CP       (STG) Swallow 1    (STG) Swallow 1 The patient will participate in a meal/follow-up assessment to determine safety and adequacy of recommended diet, independent use of safe swallow compensations, pt/family education and additional goals/recommendations to follow  -CP    Time Frame (Swallow Short Term Goal 1) 1 week  -CP       (STG) Swallow 2    (STG) Swallow 2 Pt will participate in ongoing swallow assessment to include VFSS if indicated, and caregiver teaching.  -CP    Time Frame (Swallow Short Term Goal 2) 1 week  -CP              User Key  (r) = Recorded By, (t) = Taken By, (c) = Cosigned By      Initials Name Effective Dates    CP Mihaela Casanova, SLP 06/16/21 -                     EDUCATION  The patient has been educated in the following areas:   Dysphagia (Swallowing Impairment) Oral Care/Hydration Modified Diet Instruction.        SLP GOALS       Row Name 12/08/23 1500             (LTG) Swallow    (LTG) Swallow Pt will maximize swallow function for least restrictive PO diet, exhibiting no complication associated with dysphagia, adequate PO intake, and demonstrating independent use of swallow compensations  -CP      Time Frame (Swallow Long Term Goal) by discharge  -CP         (STG) Swallow 1    (STG) Swallow 1 The patient will participate in a meal/follow-up assessment to determine safety and adequacy of recommended diet, independent use of safe swallow compensations, pt/family education and additional goals/recommendations to follow  -CP      Time Frame (Swallow Short Term Goal 1) 1 week  -CP         (STG) Swallow 2    (STG) Swallow 2 Pt will participate in ongoing swallow assessment to include VFSS if indicated, and caregiver teaching.  -CP      Time Frame (Swallow Short Term Goal 2) 1 week  -CP                 User Key  (r) = Recorded By, (t) = Taken By, (c) = Cosigned By      Initials Name Provider Type    Mihaela Rand, SLP Speech and Language Pathologist                       Time Calculation:                HARDY Patel  12/8/2023

## 2023-12-08 NOTE — CONSULTS
RENAL/KCC:    Referring Provider: Dr. Dumas  Reason for Consultation: URSULA/CKD, Hypernatremia, Hyperkalemia    Subjective     Chief complaint: AMS    History of present illness:  Patient is an 82 yo WF with h/o CKD known to me from prior admission.  Baseline Cr in the mid 1's.  She presents from her facility with AMS and left side weakness.  K 5.8 on admission with Cr 2 and Na 152.  Na worse today.  ROS limited.  She is making urine and UA looks like UTI.  No other complaints.   Home meds include lasix and KCl.  Also on Atenolol and Amlodipine.       History  Past Medical History:   Diagnosis Date    Dementia     Hearing loss     Hypertension     Normochromic normocytic anemia     Seizures     Stage III chronic kidney disease    ,   Past Surgical History:   Procedure Laterality Date    BACK SURGERY      BRAIN SURGERY      ORIF HUMERUS FRACTURE Left 1/28/2022    Procedure: HUMERUS PROXIMAL OPEN REDUCTION INTERNAL FIXATION;  Surgeon: Bulmaro Wallace MD;  Location: [facility];  Service: Orthopedics;  Laterality: Left;    ORIF WRIST FRACTURE Left 1/28/2022    Procedure: WRIST OPEN REDUCTION INTERNAL FIXATION;  Surgeon: Bulmaro Wallace MD;  Location: [facility];  Service: Orthopedics;  Laterality: Left;   ,   Family History   Problem Relation Age of Onset    No Known Problems Mother     No Known Problems Father    ,   Social History     Socioeconomic History    Marital status: Single   Tobacco Use    Smoking status: Light Smoker     Packs/day: .25     Types: Cigarettes     Passive exposure: Past    Smokeless tobacco: Never   Vaping Use    Vaping Use: Never used   Substance and Sexual Activity    Alcohol use: Never    Drug use: Never    Sexual activity: Defer     E-cigarette/Vaping    E-cigarette/Vaping Use Never User     Passive Exposure No     Counseling Given No      E-cigarette/Vaping Substances    Nicotine No     THC No     CBD No     Flavoring No      E-cigarette/Vaping Devices    Disposable No     Pre-filled  "or Refillable Cartridge No     Refillable Tank No     Pre-filled Pod No          , (Not in a hospital admission) , Scheduled Meds:  aspirin, 81 mg, Oral, Daily   Or  aspirin, 300 mg, Rectal, Daily  atorvastatin, 80 mg, Oral, Nightly  cefTRIAXone, 1,000 mg, Intravenous, Q24H  enoxaparin, 30 mg, Subcutaneous, Daily  sodium chloride, 10 mL, Intravenous, Q12H   , Continuous Infusions:  dextrose, 100 mL/hr   , PRN Meds:    sodium chloride, and Allergies:  Patient has no known allergies.    Review of Systems  Pertinent items are noted in HPI    Objective     Vital Signs  Temp:  [99.2 °F (37.3 °C)] 99.2 °F (37.3 °C)  Heart Rate:  [] 103  Resp:  [20] 20  BP: (110-165)/() 165/79    I/O this shift:  In: 240 [P.O.:240]  Out: -   No intake/output data recorded.    Physical Exam:  GEN: Awake, NAD  ENT: PERRL, EOMI, MMM  NECK: Supple, no JVD  CHEST: CTAB, no W/R/C  CV: RRR, no M/G/R  ABD: Soft, NT, +BS  SKIN: Warm and Dry  NEURO: CN's intact      Results Review:   I reviewed the patient's new clinical results.    WBC WBC   Date Value Ref Range Status   12/08/2023 10.30 3.40 - 10.80 10*3/mm3 Final   12/07/2023 11.00 (H) 3.40 - 10.80 10*3/mm3 Final      HGB Hemoglobin   Date Value Ref Range Status   12/08/2023 8.9 (L) 12.0 - 15.9 g/dL Final   12/07/2023 9.0 (L) 12.0 - 15.9 g/dL Final      HCT Hematocrit   Date Value Ref Range Status   12/08/2023 27.8 (L) 34.0 - 46.6 % Final   12/07/2023 28.1 (L) 34.0 - 46.6 % Final      Platlets No results found for: \"LABPLAT\"   MCV MCV   Date Value Ref Range Status   12/08/2023 101.5 (H) 79.0 - 97.0 fL Final   12/07/2023 99.7 (H) 79.0 - 97.0 fL Final          Sodium Sodium   Date Value Ref Range Status   12/08/2023 158 (H) 136 - 145 mmol/L Final   12/07/2023 152 (H) 136 - 145 mmol/L Final      Potassium Potassium   Date Value Ref Range Status   12/08/2023 4.8 3.5 - 5.2 mmol/L Final   12/07/2023 5.8 (H) 3.5 - 5.2 mmol/L Final      Chloride Chloride   Date Value Ref Range Status " "  12/08/2023 124 (H) 98 - 107 mmol/L Final   12/07/2023 119 (H) 98 - 107 mmol/L Final      CO2 CO2   Date Value Ref Range Status   12/08/2023 20.0 (L) 22.0 - 29.0 mmol/L Final   12/07/2023 19.0 (L) 22.0 - 29.0 mmol/L Final      BUN BUN   Date Value Ref Range Status   12/08/2023 85 (H) 8 - 23 mg/dL Final   12/07/2023 85 (H) 8 - 23 mg/dL Final      Creatinine Creatinine   Date Value Ref Range Status   12/08/2023 1.99 (H) 0.57 - 1.00 mg/dL Final   12/07/2023 2.00 (H) 0.57 - 1.00 mg/dL Final      Calcium Calcium   Date Value Ref Range Status   12/08/2023 9.4 8.6 - 10.5 mg/dL Final   12/07/2023 9.6 8.6 - 10.5 mg/dL Final      PO4 No results found for: \"CAPO4\"   Albumin Albumin   Date Value Ref Range Status   12/07/2023 3.5 3.5 - 5.2 g/dL Final      Magnesium No results found for: \"MG\"   Uric Acid No results found for: \"URICACID\"         aspirin, 81 mg, Oral, Daily   Or  aspirin, 300 mg, Rectal, Daily  atorvastatin, 80 mg, Oral, Nightly  cefTRIAXone, 1,000 mg, Intravenous, Q24H  enoxaparin, 30 mg, Subcutaneous, Daily  sodium chloride, 10 mL, Intravenous, Q12H      dextrose, 100 mL/hr        Assessment & Plan     1) URSULA  2) CKD3  3) Severe hypernatremia - from dehydration  4) Hyperkalemia - resolved  5) UTI  6) DM  7) AMS  8) HTN  9) Metabolic acidosis  10) Anemia    PLAN: Change IVF to D5W at 100 cc/hr for 2L and encourage good PO hydration.  K is now WNL.  Keep Lasix and KCl on hold.  Resume Atenolol and Amlodipine at low dose and titrate as indicated.  Check iron stores.  Avoid nephrotoxins.  ABX for UTI per primary.  Will follow closely.        Dmitry Lindsey MD  [facility]  12/08/23  @NOW          "

## 2023-12-08 NOTE — PLAN OF CARE
"Goal Outcome Evaluation:  Plan of Care Reviewed With: patient      82 y/o F who presented with pill pocketing, difficulty swallowing and new left sided deficit. Patient is from [facility]. At baseline she is ambulatory independently without AD but does have a history of dementia. Symptoms of L sided weakness and falls began 1 week ago. Per neurology, patient is not an MRI candidate. Imaging (+) for T8, T12, L5 compression fractures. Patient has multiple abrasions from repeated falls over the last week. CT (-) for acute abnormality. Patient is agitated quickly with handling and interaction. She yells \"no!\" Related to any attempt to progress to sitting edge of bed. She appears very painful. She is restless with R UE and LE but does not move L UE and LE. Demos acute weakness/flaccidity of L side. Patient appears to be functioning significantly below her baseline. Will benefit from pain meds prior to PT treatment. Recommending SNF at d/c.             Anticipated Discharge Disposition (PT): skilled nursing facility  "

## 2023-12-08 NOTE — THERAPY EVALUATION
"Patient Name: Maddison Vasquez  : 1940    MRN: [id number]                              Today's Date: 2023       Admit Date: 2023    Visit Dx:     ICD-10-CM ICD-9-CM   1. Acute renal failure, unspecified acute renal failure type  N17.9 584.9   2. UTI (urinary tract infection), uncomplicated  N39.0 599.0   3. Altered mental status, unspecified altered mental status type  R41.82 780.97   4. Hypernatremia  E87.0 276.0   5. Hyperkalemia  E87.5 276.7     Patient Active Problem List   Diagnosis    Hypertension    Seizures    Dementia    Lung nodule < 6cm on CT    Thoracic spine fracture    Hyponatremia    Anemia    Head contusion    Closed fracture of proximal end of left humerus    Closed Colles' fracture of left radius    Fall    URSULA (acute kidney injury)     Past Medical History:   Diagnosis Date    Dementia     Hearing loss     Hypertension     Normochromic normocytic anemia     Seizures     Stage III chronic kidney disease      Past Surgical History:   Procedure Laterality Date    BACK SURGERY      BRAIN SURGERY      ORIF HUMERUS FRACTURE Left 2022    Procedure: HUMERUS PROXIMAL OPEN REDUCTION INTERNAL FIXATION;  Surgeon: Bulmaro Wallace MD;  Location: [facility] OR;  Service: Orthopedics;  Laterality: Left;    ORIF WRIST FRACTURE Left 2022    Procedure: WRIST OPEN REDUCTION INTERNAL FIXATION;  Surgeon: Bulmaro Wallace MD;  Location: [facility] OR;  Service: Orthopedics;  Laterality: Left;      General Information       Row Name 23 1101          General Information    Prior Level of Function independent:;all household mobility;mod assist:;ADL's  per facility staff, 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 She changes own clothes 2-3X throughout the day until a couple of weeks ago. Feeds self, sometimes toilets self w/o asst.  -MH     Existing Precautions/Restrictions fall;spinal  pt has 3 noted spinal fractures of unknown age " "on imaging.  -     Barriers to Rehab medically complex;previous functional deficit;cognitive status;physical barrier  -       Row Name 12/08/23 1101          Living Environment    People in Home facility resident  memory care  -       Row Name 12/08/23 1101          Home Main Entrance    Number of Stairs, Main Entrance none  -       Row Name 12/08/23 1101          Stairs Within Home, Primary    Number of Stairs, Within Home, Primary none  -       Row Name 12/08/23 1101          Cognition    Orientation Status (Cognition) disoriented to;person;place;situation;time  mainly repeates \"No\". Does say \"yes\" once during OT eval to the suggestino of a warm blanket, & starts to say \"I told you to...\" and trails off. Does not make eye contact. Is painful, shaking, defensive with attempts at mvmt.  -       Row Name 12/08/23 1101          Safety Issues, Functional Mobility    Safety Issues Affecting Function (Mobility) insight into deficits/self-awareness;safety precaution awareness;judgment;sequencing abilities;ability to follow commands;problem-solving;at risk behavior observed;awareness of need for assistance  -     Impairments Affecting Function (Mobility) balance;cognition;coordination;endurance/activity tolerance;grasp;motor control;muscle tone abnormal;pain;postural/trunk control;range of motion (ROM);strength  pain withdrawl response verbally to nail bed pressure to all 4 limbs and physical withdrawl to all but LUE.  -     Cognitive Impairments, Mobility Safety/Performance attention;awareness, need for assistance;insight into deficits/self-awareness;judgment;problem-solving/reasoning;safety precaution awareness;sequencing abilities  -               User Key  (r) = Recorded By, (t) = Taken By, (c) = Cosigned By      Initials Name Provider Type     Cinthya Camilo OT Occupational Therapist                     Mobility/ADL's       Row Name 12/08/23 1106          Bed Mobility    Bed Mobility bed mobility " (all) activities  -     All Activities, [place] (Bed Mobility) dependent (less than 25% patient effort)  -[facility] Name 12/08/23 1106          Activities of Daily Living    BADL Assessment/Intervention lower body dressing;upper body dressing;grooming;feeding;toileting;bathing  -[facility] Name 12/08/23 1106          Lower Body Dressing Assessment/Training    [place] Level (Lower Body Dressing) dependent (less than 25% patient effort)  -[facility] Name 12/08/23 1106          Upper Body Dressing Assessment/Training    [place] Level (Upper Body Dressing) dependent (less than 25% patient effort)  -[facility] Name 12/08/23 1106          Grooming Assessment/Training    [place] Level (Grooming) dependent (less than 25% patient effort)  -MH       Row Name 12/08/23 1106          Self-Feeding Assessment/Training    [place] Level (Feeding) dependent (less than 25% patient effort)  -[facility] Name 12/08/23 1106          Toileting Assessment/Training    [place] Level (Toileting) dependent (less than 25% patient effort)  -[facility] Name 12/08/23 1106          Bathing Assessment/Intervention    [place] Level (Bathing) dependent (less than 25% patient effort)  -               User Key  (r) = Recorded By, (t) = Taken By, (c) = Cosigned By      Initials Name Provider Type     Cinthya Camilo, OT Occupational Therapist                   Obj/Interventions       [facility] Name 12/08/23 1107          Sensory Assessment (Somatosensory)    Sensory Assessment (Somatosensory) sensation intact;unable/difficult to assess  -[facility] Name 12/08/23 1107          Vision Assessment/Intervention    Visual Impairment/Limitations unable/difficult to assess  -[facility] Name 12/08/23 1107          Range of Motion Comprehensive    Comment, General Range of Motion no AROM noted in LUE, AROM LLE only to painful stimulus and < 25% of full ROM. RLE, RUE WFL  -[facility] Name 12/08/23 1107           Strength Comprehensive (MMT)    Comment, General Manual Muscle Testing (MMT) Assessment LUE 0/5, RUE 3/5, LLE 2/5, RLE 3/5  -               User Key  (r) = Recorded By, (t) = Taken By, (c) = Cosigned By      Initials Name Provider Type    Cinthya Zaragoza, OT Occupational Therapist                   Goals/Plan       Row Name 12/08/23 1122          Bed Mobility Goal 1 (OT)    Activity/Assistive Device (Bed Mobility Goal 1, OT) bed mobility activities, all  -MH     [place] Level/Cues Needed (Bed Mobility Goal 1, OT) minimum assist (75% or more patient effort)  -     Time Frame (Bed Mobility Goal 1, OT) 2 weeks  -       Row Name 12/08/23 1122          Grooming Goal 1 (OT)    Activity/Device (Grooming Goal 1, OT) grooming skills, all  -MH     [place] (Grooming Goal 1, OT) moderate assist (50-74% patient effort)  -     Time Frame (Grooming Goal 1, OT) 2 weeks  -       Row Name 12/08/23 1122          Self-Feeding Goal 1 (OT)    Activity/Device (Self-Feeding Goal 1, OT) self-feeding skills, all  -MH     [place] Level/Cues Needed (Self-Feeding Goal 1, OT) set-up required;standby assist;verbal cues required;nonverbal cues (demo/gesture) required  -     Time Frame (Self-Feeding Goal 1, OT) 2 weeks  -       Row Name 12/08/23 1122          Therapy Assessment/Plan (OT)    Planned Therapy Interventions (OT) activity tolerance training;adaptive equipment training;BADL retraining;cognitive/visual perception retraining;functional balance retraining;neuromuscular control/coordination retraining;prosthetic fitting/training;transfer/mobility retraining;occupation/activity based interventions  -               User Key  (r) = Recorded By, (t) = Taken By, (c) = Cosigned By      Initials Name Provider Type    Cinthya Zaragoza OT Occupational Therapist                   Clinical Impression       Row Name 12/08/23 1108          Pain Assessment    Additional Documentation Pain Scale: FACES Pre/Post-Treatment  "(Group)  -       Row Name 12/08/23 1108          Pain Scale: FACES Pre/Post-Treatment    Pain: FACES Scale, Pretreatment 8-->hurts whole lot  -     Posttreatment Pain Rating 8-->hurts whole lot  -     Pre/Posttreatment Pain Comment difficult to assess. Appears to be in pain. She has bruises on her LLE, Lt foot, face, head, and spinal fx would be painful. No hip, shld Fx on imaging.  -       Row Name 12/08/23 1108          Plan of Care Review    Plan of Care Reviewed With patient  -     Progress declining  -     Outcome Evaluation Pt is 84 y/o in memory care who is normally otiented to self and at her facility walks a lot w/o DME. Staff report she walks short distances then stops to rest holding to rails, counters, and that she does this throughout the day. They report that until a couple of weeks ago, although she required assist for all ADL she could feed herself with supervision, participate in grooming, at times she would take herself to the bathroom, and would change her clothes frequently during the day. Pt was admitted last weekend after a couple of falls with right periorbital hematoma and forehead laceration. She was not founf to have any TBI or CVA and was d/c back to memory care. She fell again and was admitted, this time with a very significant decline. She has undergone more imaging and is found to have T8 and possible T5 comp. fx and at T12 is age-indeterminate and moderate in severity. Also, she is now demonstrating left-sided hemiparesis and a change in her communication. She is only repeating \"No\". She is not moving her LUE and the hand appears flacid though proximally she has some tone. Her LLE is not moving unless in an acute reflexive withdrawl to painful stimulus. She moves her right side but is not able to be functional in any way with self care. She is painful and inappropriate to attempt mobility this date. Recommend SNF at d/c.  -       Row Name 12/08/23 1108          Therapy " Assessment/Plan (OT)    Rehab Potential (OT) fair, will monitor progress closely  -     Criteria for Skilled Therapeutic Interventions Met (OT) skilled treatment is necessary  -     Therapy Frequency (OT) 3 times/wk  -     Predicted Duration of Therapy Intervention (OT) until d/c  -       Row Name 12/08/23 1108          Therapy Plan Review/Discharge Plan (OT)    Anticipated Discharge Disposition (OT) skilled nursing facility  -       Row Name 12/08/23 1108          Vital Signs    O2 Delivery Pre Treatment room air  -MH     Pre Patient Position Supine  -MH     Intra Patient Position Side Lying  -MH     Post Patient Position Supine  -MH       Row Name 12/08/23 1108          Positioning and Restraints    Pre-Treatment Position in bed  -MH     Post Treatment Position bed  -MH     In Bed notified nsg;fowlers;exit alarm on;call light within reach;encouraged to call for assist  -               User Key  (r) = Recorded By, (t) = Taken By, (c) = Cosigned By      Initials Name Provider Type    Cinthya Zaragoza, OT Occupational Therapist                   Outcome Measures       Row Name 12/08/23 0129 12/08/23 0000       How much help from another person do you currently need...    Turning from your back to your side while in flat bed without using bedrails? 2  -CS 2  -CS    Moving from lying on back to sitting on the side of a flat bed without bedrails? 1  -CS 1  -CS    Moving to and from a bed to a chair (including a wheelchair)? 1  -CS 1  -CS    Standing up from a chair using your arms (e.g., wheelchair, bedside chair)? 1  -CS 1  -CS    Climbing 3-5 steps with a railing? 1  -CS 1  -CS    To walk in hospital room? 1  -CS 1  -CS    AM-PAC 6 Clicks Score (PT) 7  -CS 7  -CS    Highest Level of Mobility Goal 2 --> Bed activities/dependent transfer  -CS 2 --> Bed activities/dependent transfer  -CS      Row Name 12/08/23 1124          Modified [place] Scale    Pre-Stroke Modified [place] Scale 3 - Moderate disability.   Requiring some help, but able to walk without assistance.  -     Modified [place] Scale 5 - Severe disability.  Bedridden, incontinent, and requiring constant nursing care and attention.  -       Row Name 12/08/23 1124          Functional Assessment    Outcome Measure Options Modified [place]  -               User Key  (r) = Recorded By, (t) = Taken By, (c) = Cosigned By      Initials Name Provider Type     Cinthya Camilo OT Occupational Therapist    Lesly Maza RN Registered Nurse                    Occupational Therapy Education       Title: PT OT SLP Therapies (In Progress)       Topic: Occupational Therapy (In Progress)       Point: ADL training (In Progress)       Description:   Instruct learner(s) on proper safety adaptation and remediation techniques during self care or transfers.   Instruct in proper use of assistive devices.                  Learning Progress Summary             Patient Nonacceptance, E, NL,NR by  at 12/8/2023 1128                         Point: Home exercise program (Not Started)       Description:   Instruct learner(s) on appropriate technique for monitoring, assisting and/or progressing therapeutic exercises/activities.                  Learner Progress:  Not documented in this visit.              Point: Precautions (In Progress)       Description:   Instruct learner(s) on prescribed precautions during self-care and functional transfers.                  Learning Progress Summary             Patient Nonacceptance, E, NL,NR by  at 12/8/2023 1128                         Point: Body mechanics (In Progress)       Description:   Instruct learner(s) on proper positioning and spine alignment during self-care, functional mobility activities and/or exercises.                  Learning Progress Summary             Patient Nonacceptance, E, NL,NR by  at 12/8/2023 1128                                         User Key       Initials Effective Dates Name Provider Type Discipline  "    06/16/21 -  Cinthya Camilo, GEOFF Occupational Therapist OT                  OT Recommendation and Plan  Planned Therapy Interventions (OT): activity tolerance training, adaptive equipment training, BADL retraining, cognitive/visual perception retraining, functional balance retraining, neuromuscular control/coordination retraining, prosthetic fitting/training, transfer/mobility retraining, occupation/activity based interventions  Therapy Frequency (OT): 3 times/wk  Plan of Care Review  Plan of Care Reviewed With: patient  Progress: declining  Outcome Evaluation: Pt is 84 y/o in memory care who is normally otiented to self and at her facility walks a lot w/o DME. Staff report she walks short distances then stops to rest holding to rails, counters, and that she does this throughout the day. They report that until a couple of weeks ago, although she required assist for all ADL she could feed herself with supervision, participate in grooming, at times she would take herself to the bathroom, and would change her clothes frequently during the day. Pt was admitted last weekend after a couple of falls with right periorbital hematoma and forehead laceration. She was not founf to have any TBI or CVA and was d/c back to [facility] care. She fell again and was admitted, this time with a very significant decline. She has undergone more imaging and is found to have T8 and possible T5 comp. fx and at T12 is age-indeterminate and moderate in severity. Also, she is now demonstrating left-sided hemiparesis and a change in her communication. She is only repeating \"No\". She is not moving her LUE and the hand appears flacid though proximally she has some tone. Her LLE is not moving unless in an acute reflexive withdrawl to painful stimulus. She moves her right side but is not able to be functional in any way with self care. She is painful and inappropriate to attempt mobility this date. Recommend SNF at d/c.     Time Calculation:         " Time Calculation- OT       Row Name 12/08/23 1133             Time Calculation- OT    OT Start Time 0915  -      OT Stop Time 0931  -      OT Time Calculation (min) 16 min  -      Total Timed Code Minutes- OT 0 minute(s)  -      OT Received On 12/08/23  -      OT - Next Appointment 12/11/23  -      OT Goal Re-Cert Due Date 12/22/23  -                User Key  (r) = Recorded By, (t) = Taken By, (c) = Cosigned By      Initials Name Provider Type     Cinthya Camilo OT Occupational Therapist                  Therapy Charges for Today       Code Description Service Date Service Provider Modifiers Qty    [id number] HC OT EVAL HIGH COMPLEXITY 3 12/8/2023 Cinthya Camilo OT GO 1                 Cinthya Camilo OT  12/8/2023   Detail Level: Simple

## 2023-12-09 LAB
ANION GAP SERPL CALCULATED.3IONS-SCNC: 11 MMOL/L (ref 5–15)
BACTERIA SPEC AEROBE CULT: ABNORMAL
BASOPHILS # BLD AUTO: 0.1 10*3/MM3 (ref 0–0.2)
BASOPHILS NFR BLD AUTO: 0.6 % (ref 0–1.5)
BUN SERPL-MCNC: 76 MG/DL (ref 8–23)
BUN/CREAT SERPL: 41.8 (ref 7–25)
CALCIUM SPEC-SCNC: 9.2 MG/DL (ref 8.6–10.5)
CHLORIDE SERPL-SCNC: 122 MMOL/L (ref 98–107)
CO2 SERPL-SCNC: 22 MMOL/L (ref 22–29)
CREAT SERPL-MCNC: 1.82 MG/DL (ref 0.57–1)
DEPRECATED RDW RBC AUTO: 48.6 FL (ref 37–54)
EGFRCR SERPLBLD CKD-EPI 2021: 27.3 ML/MIN/1.73
EOSINOPHIL # BLD AUTO: 0.1 10*3/MM3 (ref 0–0.4)
EOSINOPHIL NFR BLD AUTO: 1.6 % (ref 0.3–6.2)
ERYTHROCYTE [DISTWIDTH] IN BLOOD BY AUTOMATED COUNT: 13.4 % (ref 12.3–15.4)
FERRITIN SERPL-MCNC: 869.7 NG/ML (ref 13–150)
GLUCOSE BLDC GLUCOMTR-MCNC: 155 MG/DL (ref 70–105)
GLUCOSE BLDC GLUCOMTR-MCNC: 158 MG/DL (ref 70–105)
GLUCOSE BLDC GLUCOMTR-MCNC: 171 MG/DL (ref 70–105)
GLUCOSE BLDC GLUCOMTR-MCNC: 173 MG/DL (ref 70–105)
GLUCOSE SERPL-MCNC: 154 MG/DL (ref 65–99)
HCT VFR BLD AUTO: 26.2 % (ref 34–46.6)
HGB BLD-MCNC: 8.4 G/DL (ref 12–15.9)
IRON 24H UR-MRATE: 43 MCG/DL (ref 37–145)
IRON SATN MFR SERPL: 23 % (ref 20–50)
LYMPHOCYTES # BLD AUTO: 1.7 10*3/MM3 (ref 0.7–3.1)
LYMPHOCYTES NFR BLD AUTO: 19.2 % (ref 19.6–45.3)
MAGNESIUM SERPL-MCNC: 2.9 MG/DL (ref 1.6–2.4)
MCH RBC QN AUTO: 32.6 PG (ref 26.6–33)
MCHC RBC AUTO-ENTMCNC: 32 G/DL (ref 31.5–35.7)
MCV RBC AUTO: 102 FL (ref 79–97)
MONOCYTES # BLD AUTO: 1.1 10*3/MM3 (ref 0.1–0.9)
MONOCYTES NFR BLD AUTO: 11.9 % (ref 5–12)
NEUTROPHILS NFR BLD AUTO: 6 10*3/MM3 (ref 1.7–7)
NEUTROPHILS NFR BLD AUTO: 66.7 % (ref 42.7–76)
NRBC BLD AUTO-RTO: 0.1 /100 WBC (ref 0–0.2)
PHOSPHATE SERPL-MCNC: 5.1 MG/DL (ref 2.5–4.5)
PLATELET # BLD AUTO: 333 10*3/MM3 (ref 140–450)
PMV BLD AUTO: 9.6 FL (ref 6–12)
POTASSIUM SERPL-SCNC: 4.5 MMOL/L (ref 3.5–5.2)
RBC # BLD AUTO: 2.57 10*6/MM3 (ref 3.77–5.28)
SODIUM SERPL-SCNC: 155 MMOL/L (ref 136–145)
TIBC SERPL-MCNC: 189 MCG/DL (ref 298–536)
TRANSFERRIN SERPL-MCNC: 127 MG/DL (ref 200–360)
WBC NRBC COR # BLD AUTO: 9 10*3/MM3 (ref 3.4–10.8)

## 2023-12-09 PROCEDURE — 83540 ASSAY OF IRON: CPT | Performed by: INTERNAL MEDICINE

## 2023-12-09 PROCEDURE — 83735 ASSAY OF MAGNESIUM: CPT | Performed by: INTERNAL MEDICINE

## 2023-12-09 PROCEDURE — 25010000002 ENOXAPARIN PER 10 MG: Performed by: INTERNAL MEDICINE

## 2023-12-09 PROCEDURE — 25010000002 CEFTRIAXONE PER 250 MG: Performed by: INTERNAL MEDICINE

## 2023-12-09 PROCEDURE — 80048 BASIC METABOLIC PNL TOTAL CA: CPT | Performed by: INTERNAL MEDICINE

## 2023-12-09 PROCEDURE — 84100 ASSAY OF PHOSPHORUS: CPT | Performed by: INTERNAL MEDICINE

## 2023-12-09 PROCEDURE — 82948 REAGENT STRIP/BLOOD GLUCOSE: CPT

## 2023-12-09 PROCEDURE — 0 DEXTROSE 5 % SOLUTION: Performed by: INTERNAL MEDICINE

## 2023-12-09 PROCEDURE — 85025 COMPLETE CBC W/AUTO DIFF WBC: CPT | Performed by: INTERNAL MEDICINE

## 2023-12-09 PROCEDURE — 82728 ASSAY OF FERRITIN: CPT | Performed by: INTERNAL MEDICINE

## 2023-12-09 PROCEDURE — 84466 ASSAY OF TRANSFERRIN: CPT | Performed by: INTERNAL MEDICINE

## 2023-12-09 RX ORDER — DEXTROSE MONOHYDRATE 50 MG/ML
50 INJECTION, SOLUTION INTRAVENOUS CONTINUOUS
Status: DISCONTINUED | OUTPATIENT
Start: 2023-12-09 | End: 2023-12-09

## 2023-12-09 RX ORDER — DEXTROSE MONOHYDRATE 50 MG/ML
100 INJECTION, SOLUTION INTRAVENOUS CONTINUOUS
Status: DISCONTINUED | OUTPATIENT
Start: 2023-12-09 | End: 2023-12-10

## 2023-12-09 RX ADMIN — ATORVASTATIN CALCIUM 80 MG: 40 TABLET, FILM COATED ORAL at 21:35

## 2023-12-09 RX ADMIN — ATORVASTATIN CALCIUM 80 MG: 40 TABLET, FILM COATED ORAL at 05:23

## 2023-12-09 RX ADMIN — ENOXAPARIN SODIUM 30 MG: 100 INJECTION SUBCUTANEOUS at 16:12

## 2023-12-09 RX ADMIN — AMLODIPINE BESYLATE 5 MG: 5 TABLET ORAL at 09:25

## 2023-12-09 RX ADMIN — CEFTRIAXONE 1000 MG: 1 INJECTION, POWDER, FOR SOLUTION INTRAMUSCULAR; INTRAVENOUS at 16:11

## 2023-12-09 RX ADMIN — DEXTROSE 50 ML/HR: 5 SOLUTION INTRAVENOUS at 10:52

## 2023-12-09 RX ADMIN — Medication 10 ML: at 21:35

## 2023-12-09 RX ADMIN — DEXTROSE MONOHYDRATE 100 ML/HR: 50 INJECTION, SOLUTION INTRAVENOUS at 16:12

## 2023-12-09 RX ADMIN — ATENOLOL 50 MG: 50 TABLET ORAL at 09:26

## 2023-12-09 RX ADMIN — Medication 10 ML: at 09:26

## 2023-12-09 RX ADMIN — ASPIRIN 81 MG CHEWABLE TABLET 81 MG: 81 TABLET CHEWABLE at 09:26

## 2023-12-09 RX ADMIN — DEXTROSE MONOHYDRATE 100 ML/HR: 50 INJECTION, SOLUTION INTRAVENOUS at 05:43

## 2023-12-09 NOTE — PROGRESS NOTES
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    I think she is unmasking    She has dementia    She can have a more or less genetic and yes and no conversation but nothing purposeful    Her granddaughter reports that she has not known her since she can remember    She does remember some childhood stuff and early life stuff and does listen to songs but has no other good quality of life    She cannot remember or has meaningful life or conversation    UTI obviously is making things worse    Anemia and acute renal insufficiency are definitely not helping    She is not a candidate for intervention and I cannot do CTA or MRA    Finally the granddaughter is thinking of making her comfort measures and she is going to think about it and call back and consider hospice    I have recommended palliative or hospice consultation    Per neurology there is not much that I can do to change her quality of life or improve it    She has delirium secondary to medical condition and she has no good reserve left    Any further testing is not going to change much    Continue supportive care    Call if needed
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
## 2023-12-09 NOTE — PROGRESS NOTES
"RENAL/KCC:     LOS: 2 days    Patient Care Team:  Provider, No Known as PCP - General    Chief Complaint:  URSULA, Hypernatremia    Subjective     Interval History:   Chart reviewed  Awakens  ROS unobtainable  Labs pending    Objective     Vital Sign Min/Max for last 24 hours  Temp  Min: 97.4 °F (36.3 °C)  Max: 98.4 °F (36.9 °C)   BP  Min: 99/77  Max: 153/68   Pulse  Min: 78  Max: 95   Resp  Min: 20  Max: 23   SpO2  Min: 97 %  Max: 100 %   Flow (L/min)  Min: 2  Max: 2   Weight  Min: 60.8 kg (134 lb 0.6 oz)  Max: 60.8 kg (134 lb 0.6 oz)     Flowsheet Rows      Flowsheet Row First Filed Value   Admission Height 154.9 cm (61\") Documented at 12/07/2023 1204   Admission Weight 65.8 kg (145 lb) Documented at 12/07/2023 1204            No intake/output data recorded.  I/O last 3 completed shifts:  In: 240 [P.O.:240]  Out: -     Physical Exam:  GEN: Awake, NAD  ENT: PERRL, EOMI, MMM  NECK: Supple, no JVD  CHEST: CTAB, no W/R/C  CV: RRR, no M/G/R  ABD: Soft, NT, +BS  SKIN: Warm and Dry  NEURO: CN's intact      WBC WBC   Date Value Ref Range Status   12/08/2023 10.30 3.40 - 10.80 10*3/mm3 Final   12/07/2023 11.00 (H) 3.40 - 10.80 10*3/mm3 Final      HGB Hemoglobin   Date Value Ref Range Status   12/08/2023 8.9 (L) 12.0 - 15.9 g/dL Final   12/07/2023 9.0 (L) 12.0 - 15.9 g/dL Final      HCT Hematocrit   Date Value Ref Range Status   12/08/2023 27.8 (L) 34.0 - 46.6 % Final   12/07/2023 28.1 (L) 34.0 - 46.6 % Final      Platlets No results found for: \"LABPLAT\"   MCV MCV   Date Value Ref Range Status   12/08/2023 101.5 (H) 79.0 - 97.0 fL Final   12/07/2023 99.7 (H) 79.0 - 97.0 fL Final          Sodium Sodium   Date Value Ref Range Status   12/08/2023 158 (H) 136 - 145 mmol/L Final   12/07/2023 152 (H) 136 - 145 mmol/L Final      Potassium Potassium   Date Value Ref Range Status   12/08/2023 4.8 3.5 - 5.2 mmol/L Final   12/07/2023 5.8 (H) 3.5 - 5.2 mmol/L Final      Chloride Chloride   Date Value Ref Range Status   12/08/2023 124 (H) " "98 - 107 mmol/L Final   12/07/2023 119 (H) 98 - 107 mmol/L Final      CO2 CO2   Date Value Ref Range Status   12/08/2023 20.0 (L) 22.0 - 29.0 mmol/L Final   12/07/2023 19.0 (L) 22.0 - 29.0 mmol/L Final      BUN BUN   Date Value Ref Range Status   12/08/2023 85 (H) 8 - 23 mg/dL Final   12/07/2023 85 (H) 8 - 23 mg/dL Final      Creatinine Creatinine   Date Value Ref Range Status   12/08/2023 1.99 (H) 0.57 - 1.00 mg/dL Final   12/07/2023 2.00 (H) 0.57 - 1.00 mg/dL Final      Calcium Calcium   Date Value Ref Range Status   12/08/2023 9.4 8.6 - 10.5 mg/dL Final   12/07/2023 9.6 8.6 - 10.5 mg/dL Final      PO4 No results found for: \"CAPO4\"   Albumin Albumin   Date Value Ref Range Status   12/07/2023 3.5 3.5 - 5.2 g/dL Final      Magnesium No results found for: \"MG\"   Uric Acid No results found for: \"URICACID\"        Results Review:     I reviewed the patient's new clinical results.    amLODIPine, 5 mg, Oral, Q24H  aspirin, 81 mg, Oral, Daily   Or  aspirin, 300 mg, Rectal, Daily  atenolol, 50 mg, Oral, Q24H  atorvastatin, 80 mg, Oral, Nightly  cefTRIAXone, 1,000 mg, Intravenous, Q24H  enoxaparin, 30 mg, Subcutaneous, Daily  sodium chloride, 10 mL, Intravenous, Q12H      dextrose, 100 mL/hr, Last Rate: 100 mL/hr (12/09/23 0543)        Medication Review: Reviewed    Assessment & Plan     1) URSULA  2) CKD3 - Baseline Cr mid 1's  3) Severe hypernatremia - from dehydration  4) Hyperkalemia - resolved  5) UTI  6) DM  7) AMS  8) HTN  9) Metabolic acidosis  10) Anemia     PLAN: F/U labs and continue D5W at reduced rate for now.  Keep Lasix and KCl on hold.  Titrate BP meds as needed.  Check iron stores.  Avoid nephrotoxins.  ABX for UTI per primary.  Will follow closely.        Dmitry Lindsey MD  [facility]  12/09/23  10:00 EST  "

## 2023-12-09 NOTE — PLAN OF CARE
Problem: Fall Injury Risk  Goal: Absence of Fall and Fall-Related Injury  Outcome: Ongoing, Progressing  Intervention: Identify and Manage Contributors  Recent Flowsheet Documentation  Taken 12/9/2023 1800 by Tammi Saleem LPN  Medication Review/Management: medications reviewed  Taken 12/9/2023 1550 by Tammi Saleem LPN  Medication Review/Management: medications reviewed  Taken 12/9/2023 1400 by Tammi Saleem LPN  Medication Review/Management: medications reviewed  Taken 12/9/2023 1129 by Tammi Saleem LPN  Medication Review/Management: medications reviewed  Taken 12/9/2023 1000 by Tammi Saleem LPN  Medication Review/Management: medications reviewed  Taken 12/9/2023 0913 by Tammi Saleem LPN  Medication Review/Management: medications reviewed  Intervention: Promote Injury-Free Environment  Recent Flowsheet Documentation  Taken 12/9/2023 1800 by Tammi Saleem LPN  Safety Promotion/Fall Prevention: safety round/check completed  Taken 12/9/2023 1550 by Tammi Saleem LPN  Safety Promotion/Fall Prevention: safety round/check completed  Taken 12/9/2023 1400 by Tammi Saleem LPN  Safety Promotion/Fall Prevention: safety round/check completed  Taken 12/9/2023 1129 by Tammi Saleem LPN  Safety Promotion/Fall Prevention: safety round/check completed  Taken 12/9/2023 1000 by Tammi Saleem LPN  Safety Promotion/Fall Prevention: safety round/check completed     Problem: Skin Injury Risk Increased  Goal: Skin Health and Integrity  Outcome: Ongoing, Progressing     Problem: Restraint, Nonviolent  Goal: Absence of Harm or Injury  Outcome: Ongoing, Progressing  Intervention: Implement Least Restrictive Safety Strategies  Recent Flowsheet Documentation  Taken 12/9/2023 1800 by Tammi Saleem LPN  Medical Device Protection: IV pole/bag removed from visual field  Less Restrictive Alternative: sensory stimulation limited  De-Escalation Techniques: stimulation decreased  Diversional Activities: other (see comments)  Taken  12/9/2023 1600 by Tammi Saleem LPN  Medical Device Protection: IV pole/bag removed from visual field  Less Restrictive Alternative: sensory stimulation limited  De-Escalation Techniques: stimulation decreased  Diversional Activities: other (see comments)  Taken 12/9/2023 1550 by Tammi Saleem LPN  Medical Device Protection: IV pole/bag removed from visual field  Taken 12/9/2023 1400 by Tammi Saleem LPN  Medical Device Protection: IV pole/bag removed from visual field  Less Restrictive Alternative: environment adjusted  De-Escalation Techniques:   stimulation decreased   reoriented  Diversional Activities: other (see comments)  Taken 12/9/2023 1200 by Tammi Saleem LPN  Medical Device Protection:   IV pole/bag removed from visual field   tubing secured  Less Restrictive Alternative: sensory stimulation limited  De-Escalation Techniques: stimulation decreased  Diversional Activities: other (see comments)  Taken 12/9/2023 1129 by Tammi Saleem LPN  Medical Device Protection: IV pole/bag removed from visual field  Taken 12/9/2023 1000 by Tammi Saleem LPN  Medical Device Protection: IV pole/bag removed from visual field  Less Restrictive Alternative: sensory stimulation limited  De-Escalation Techniques:   stimulation decreased   reoriented  Diversional Activities: other (see comments)  Taken 12/9/2023 0913 by Tammi Saleem LPN  Medical Device Protection: IV pole/bag removed from visual field  Taken 12/9/2023 0800 by Tammi Saleem LPN  Medical Device Protection: IV pole/bag removed from visual field  Less Restrictive Alternative: sensory stimulation limited  De-Escalation Techniques:   reoriented   verbally redirected   stimulation decreased  Diversional Activities: television  Intervention: Protect Dignity, Rights, and Personal Wellbeing  Recent Flowsheet Documentation  Taken 12/9/2023 0913 by Tammi Saleem LPN  Trust Relationship/Rapport: care explained   Goal Outcome Evaluation:
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
## 2023-12-10 LAB
ANION GAP SERPL CALCULATED.3IONS-SCNC: 13 MMOL/L (ref 5–15)
BASOPHILS # BLD MANUAL: 0.08 10*3/MM3 (ref 0–0.2)
BASOPHILS NFR BLD MANUAL: 1 % (ref 0–1.5)
BUN SERPL-MCNC: 72 MG/DL (ref 8–23)
BUN/CREAT SERPL: 45.9 (ref 7–25)
CALCIUM SPEC-SCNC: 8.9 MG/DL (ref 8.6–10.5)
CHLORIDE SERPL-SCNC: 114 MMOL/L (ref 98–107)
CO2 SERPL-SCNC: 20 MMOL/L (ref 22–29)
CREAT SERPL-MCNC: 1.57 MG/DL (ref 0.57–1)
DEPRECATED RDW RBC AUTO: 47.3 FL (ref 37–54)
EGFRCR SERPLBLD CKD-EPI 2021: 32.6 ML/MIN/1.73
EOSINOPHIL # BLD MANUAL: 0.23 10*3/MM3 (ref 0–0.4)
EOSINOPHIL NFR BLD MANUAL: 3 % (ref 0.3–6.2)
ERYTHROCYTE [DISTWIDTH] IN BLOOD BY AUTOMATED COUNT: 12.6 % (ref 12.3–15.4)
GLUCOSE BLDC GLUCOMTR-MCNC: 118 MG/DL (ref 70–105)
GLUCOSE BLDC GLUCOMTR-MCNC: 126 MG/DL (ref 70–105)
GLUCOSE BLDC GLUCOMTR-MCNC: 170 MG/DL (ref 70–105)
GLUCOSE SERPL-MCNC: 169 MG/DL (ref 65–99)
HCT VFR BLD AUTO: 25.3 % (ref 34–46.6)
HGB BLD-MCNC: 7.9 G/DL (ref 12–15.9)
LARGE PLATELETS: ABNORMAL
LYMPHOCYTES # BLD MANUAL: 1.77 10*3/MM3 (ref 0.7–3.1)
LYMPHOCYTES NFR BLD MANUAL: 3 % (ref 5–12)
MAGNESIUM SERPL-MCNC: 2.6 MG/DL (ref 1.6–2.4)
MCH RBC QN AUTO: 31.3 PG (ref 26.6–33)
MCHC RBC AUTO-ENTMCNC: 31.4 G/DL (ref 31.5–35.7)
MCV RBC AUTO: 99.7 FL (ref 79–97)
MONOCYTES # BLD: 0.23 10*3/MM3 (ref 0.1–0.9)
NEUTROPHILS # BLD AUTO: 5.39 10*3/MM3 (ref 1.7–7)
NEUTROPHILS NFR BLD MANUAL: 67 % (ref 42.7–76)
NEUTS BAND NFR BLD MANUAL: 3 % (ref 0–5)
PHOSPHATE SERPL-MCNC: 4.4 MG/DL (ref 2.5–4.5)
PLATELET # BLD AUTO: 279 10*3/MM3 (ref 140–450)
PMV BLD AUTO: 9.6 FL (ref 6–12)
POTASSIUM SERPL-SCNC: 4.3 MMOL/L (ref 3.5–5.2)
QT INTERVAL: 348 MS
QTC INTERVAL: 451 MS
RBC # BLD AUTO: 2.53 10*6/MM3 (ref 3.77–5.28)
RBC MORPH BLD: NORMAL
SCAN SLIDE: NORMAL
SODIUM SERPL-SCNC: 147 MMOL/L (ref 136–145)
VARIANT LYMPHS NFR BLD MANUAL: 1 % (ref 0–5)
VARIANT LYMPHS NFR BLD MANUAL: 22 % (ref 19.6–45.3)
WBC MORPH BLD: NORMAL
WBC NRBC COR # BLD AUTO: 7.7 10*3/MM3 (ref 3.4–10.8)

## 2023-12-10 PROCEDURE — 83735 ASSAY OF MAGNESIUM: CPT | Performed by: INTERNAL MEDICINE

## 2023-12-10 PROCEDURE — 82948 REAGENT STRIP/BLOOD GLUCOSE: CPT

## 2023-12-10 PROCEDURE — 84100 ASSAY OF PHOSPHORUS: CPT | Performed by: INTERNAL MEDICINE

## 2023-12-10 PROCEDURE — 80048 BASIC METABOLIC PNL TOTAL CA: CPT | Performed by: INTERNAL MEDICINE

## 2023-12-10 PROCEDURE — 0 DEXTROSE 5 % SOLUTION: Performed by: INTERNAL MEDICINE

## 2023-12-10 PROCEDURE — 99222 1ST HOSP IP/OBS MODERATE 55: CPT | Performed by: PSYCHIATRY & NEUROLOGY

## 2023-12-10 PROCEDURE — 85025 COMPLETE CBC W/AUTO DIFF WBC: CPT | Performed by: INTERNAL MEDICINE

## 2023-12-10 PROCEDURE — 25010000002 ENOXAPARIN PER 10 MG: Performed by: INTERNAL MEDICINE

## 2023-12-10 PROCEDURE — 85007 BL SMEAR W/DIFF WBC COUNT: CPT | Performed by: INTERNAL MEDICINE

## 2023-12-10 RX ORDER — TRAZODONE HYDROCHLORIDE 50 MG/1
50 TABLET ORAL NIGHTLY
Status: DISCONTINUED | OUTPATIENT
Start: 2023-12-10 | End: 2023-12-14 | Stop reason: HOSPADM

## 2023-12-10 RX ORDER — OLANZAPINE 5 MG/1
5 TABLET, ORALLY DISINTEGRATING ORAL 2 TIMES DAILY PRN
Status: DISCONTINUED | OUTPATIENT
Start: 2023-12-10 | End: 2023-12-14 | Stop reason: HOSPADM

## 2023-12-10 RX ORDER — SODIUM CHLORIDE 450 MG/100ML
100 INJECTION, SOLUTION INTRAVENOUS CONTINUOUS
Status: DISPENSED | OUTPATIENT
Start: 2023-12-10 | End: 2023-12-11

## 2023-12-10 RX ORDER — AMLODIPINE BESYLATE 5 MG/1
10 TABLET ORAL
Status: DISCONTINUED | OUTPATIENT
Start: 2023-12-10 | End: 2023-12-14 | Stop reason: HOSPADM

## 2023-12-10 RX ADMIN — Medication 10 ML: at 21:03

## 2023-12-10 RX ADMIN — DEXTROSE MONOHYDRATE 100 ML/HR: 50 INJECTION, SOLUTION INTRAVENOUS at 03:22

## 2023-12-10 RX ADMIN — ENOXAPARIN SODIUM 30 MG: 100 INJECTION SUBCUTANEOUS at 16:43

## 2023-12-10 RX ADMIN — ATORVASTATIN CALCIUM 80 MG: 40 TABLET, FILM COATED ORAL at 21:02

## 2023-12-10 RX ADMIN — SODIUM CHLORIDE 100 ML/HR: 4.5 INJECTION, SOLUTION INTRAVENOUS at 09:14

## 2023-12-10 RX ADMIN — TRAZODONE HYDROCHLORIDE 50 MG: 50 TABLET ORAL at 21:02

## 2023-12-10 RX ADMIN — AMLODIPINE BESYLATE 10 MG: 5 TABLET ORAL at 09:03

## 2023-12-10 RX ADMIN — Medication 10 ML: at 09:04

## 2023-12-10 RX ADMIN — ASPIRIN 81 MG CHEWABLE TABLET 81 MG: 81 TABLET CHEWABLE at 09:03

## 2023-12-10 RX ADMIN — ATENOLOL 50 MG: 50 TABLET ORAL at 09:03

## 2023-12-10 RX ADMIN — SODIUM CHLORIDE 100 ML/HR: 4.5 INJECTION, SOLUTION INTRAVENOUS at 19:15

## 2023-12-10 NOTE — PLAN OF CARE
Problem: Fall Injury Risk  Goal: Absence of Fall and Fall-Related Injury  Outcome: Ongoing, Progressing  Intervention: Identify and Manage Contributors  Recent Flowsheet Documentation  Taken 12/10/2023 1600 by Tammi Saleem LPN  Medication Review/Management: medications reviewed  Taken 12/10/2023 1400 by Tammi Saleem LPN  Medication Review/Management: medications reviewed  Taken 12/10/2023 1200 by Tammi Saleem LPN  Medication Review/Management: medications reviewed  Taken 12/10/2023 1000 by Tammi Saleem LPN  Medication Review/Management: medications reviewed  Taken 12/10/2023 0905 by Tammi Saleem LPN  Medication Review/Management: medications reviewed  Intervention: Promote Injury-Free Environment  Recent Flowsheet Documentation  Taken 12/10/2023 1600 by Tammi Saleem LPN  Safety Promotion/Fall Prevention: safety round/check completed  Taken 12/10/2023 1400 by Tammi Saleem LPN  Safety Promotion/Fall Prevention: safety round/check completed  Taken 12/10/2023 1200 by Tammi Saleem LPN  Safety Promotion/Fall Prevention: safety round/check completed  Taken 12/10/2023 1000 by Tammi Saleem LPN  Safety Promotion/Fall Prevention: safety round/check completed  Taken 12/10/2023 0905 by Tammi Saleem LPN  Safety Promotion/Fall Prevention: safety round/check completed     Problem: Skin Injury Risk Increased  Goal: Skin Health and Integrity  Outcome: Ongoing, Progressing     Problem: Restraint, Nonviolent  Goal: Absence of Harm or Injury  Outcome: Ongoing, Progressing  Intervention: Implement Least Restrictive Safety Strategies  Recent Flowsheet Documentation  Taken 12/10/2023 1600 by Tammi Saleem LPN  Medical Device Protection: IV pole/bag removed from visual field  Less Restrictive Alternative:   bed alarm in use   sensory stimulation limited  De-Escalation Techniques: stimulation decreased  Diversional Activities: television  Taken 12/10/2023 1400 by Tammi Saleem LPN  Medical Device Protection: IV  pole/bag removed from visual field  Less Restrictive Alternative:   bed alarm in use   sensory stimulation limited  De-Escalation Techniques: stimulation decreased  Diversional Activities: television  Taken 12/10/2023 1200 by Tammi Saleem LPN  Medical Device Protection: IV pole/bag removed from visual field  Less Restrictive Alternative:   bed alarm in use   sensory stimulation limited  De-Escalation Techniques: stimulation decreased  Diversional Activities: television  Taken 12/10/2023 1041 by Tammi Saleme LPN  Medical Device Protection: IV pole/bag removed from visual field  Less Restrictive Alternative: bed alarm in use  De-Escalation Techniques: stimulation decreased  Diversional Activities: other (see comments)  Taken 12/10/2023 1000 by Tammi Saleem LPN  Medical Device Protection: IV pole/bag removed from visual field  Less Restrictive Alternative:   bed alarm in use   environment adjusted  De-Escalation Techniques:   reoriented   stimulation decreased  Diversional Activities: television  Taken 12/10/2023 0905 by Tammi Saleem LPN  Medical Device Protection: IV pole/bag removed from visual field  Taken 12/10/2023 0800 by Tammi Saleem LPN  Medical Device Protection: IV pole/bag removed from visual field  Less Restrictive Alternative:   bed alarm in use   environment adjusted  De-Escalation Techniques:   reoriented   stimulation decreased   verbally redirected  Diversional Activities: television   Goal Outcome Evaluation:
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
## 2023-12-10 NOTE — PROGRESS NOTES
"RENAL/KCC:     LOS: 3 days    Patient Care Team:  Provider, No Known as PCP - General    Chief Complaint:  URSULA, Hypernatremia    Subjective     Interval History:   Chart reviewed    Objective     Vital Sign Min/Max for last 24 hours  Temp  Min: 97.1 °F (36.2 °C)  Max: 98.6 °F (37 °C)   BP  Min: 130/54  Max: 164/61   Pulse  Min: 70  Max: 86   Resp  Min: 20  Max: 32   SpO2  Min: 90 %  Max: 96 %   Flow (L/min)  Min: 2  Max: 2   Weight  Min: 65 kg (143 lb 4.8 oz)  Max: 65 kg (143 lb 4.8 oz)     Flowsheet Rows      Flowsheet Row First Filed Value   Admission Height 154.9 cm (61\") Documented at 12/07/2023 1204   Admission Weight 65.8 kg (145 lb) Documented at 12/07/2023 1204            No intake/output data recorded.  I/O last 3 completed shifts:  In: 0   Out: 700 [Urine:700]    Physical Exam:        WBC WBC   Date Value Ref Range Status   12/10/2023 7.70 3.40 - 10.80 10*3/mm3 Final   12/09/2023 9.00 3.40 - 10.80 10*3/mm3 Final   12/08/2023 10.30 3.40 - 10.80 10*3/mm3 Final   12/07/2023 11.00 (H) 3.40 - 10.80 10*3/mm3 Final      HGB Hemoglobin   Date Value Ref Range Status   12/10/2023 7.9 (L) 12.0 - 15.9 g/dL Final   12/09/2023 8.4 (L) 12.0 - 15.9 g/dL Final   12/08/2023 8.9 (L) 12.0 - 15.9 g/dL Final   12/07/2023 9.0 (L) 12.0 - 15.9 g/dL Final      HCT Hematocrit   Date Value Ref Range Status   12/10/2023 25.3 (L) 34.0 - 46.6 % Final   12/09/2023 26.2 (L) 34.0 - 46.6 % Final   12/08/2023 27.8 (L) 34.0 - 46.6 % Final   12/07/2023 28.1 (L) 34.0 - 46.6 % Final      Platlets No results found for: \"LABPLAT\"   MCV MCV   Date Value Ref Range Status   12/10/2023 99.7 (H) 79.0 - 97.0 fL Final   12/09/2023 102.0 (H) 79.0 - 97.0 fL Final   12/08/2023 101.5 (H) 79.0 - 97.0 fL Final   12/07/2023 99.7 (H) 79.0 - 97.0 fL Final          Sodium Sodium   Date Value Ref Range Status   12/10/2023 147 (H) 136 - 145 mmol/L Final   12/09/2023 155 (H) 136 - 145 mmol/L Final   12/08/2023 158 (H) 136 - 145 mmol/L Final   12/07/2023 152 (H) 136 - " "145 mmol/L Final      Potassium Potassium   Date Value Ref Range Status   12/10/2023 4.3 3.5 - 5.2 mmol/L Final   12/09/2023 4.5 3.5 - 5.2 mmol/L Final   12/08/2023 4.8 3.5 - 5.2 mmol/L Final   12/07/2023 5.8 (H) 3.5 - 5.2 mmol/L Final      Chloride Chloride   Date Value Ref Range Status   12/10/2023 114 (H) 98 - 107 mmol/L Final   12/09/2023 122 (H) 98 - 107 mmol/L Final   12/08/2023 124 (H) 98 - 107 mmol/L Final   12/07/2023 119 (H) 98 - 107 mmol/L Final      CO2 CO2   Date Value Ref Range Status   12/10/2023 20.0 (L) 22.0 - 29.0 mmol/L Final   12/09/2023 22.0 22.0 - 29.0 mmol/L Final   12/08/2023 20.0 (L) 22.0 - 29.0 mmol/L Final   12/07/2023 19.0 (L) 22.0 - 29.0 mmol/L Final      BUN BUN   Date Value Ref Range Status   12/10/2023 72 (H) 8 - 23 mg/dL Final   12/09/2023 76 (H) 8 - 23 mg/dL Final   12/08/2023 85 (H) 8 - 23 mg/dL Final   12/07/2023 85 (H) 8 - 23 mg/dL Final      Creatinine Creatinine   Date Value Ref Range Status   12/10/2023 1.57 (H) 0.57 - 1.00 mg/dL Final   12/09/2023 1.82 (H) 0.57 - 1.00 mg/dL Final   12/08/2023 1.99 (H) 0.57 - 1.00 mg/dL Final   12/07/2023 2.00 (H) 0.57 - 1.00 mg/dL Final      Calcium Calcium   Date Value Ref Range Status   12/10/2023 8.9 8.6 - 10.5 mg/dL Final   12/09/2023 9.2 8.6 - 10.5 mg/dL Final   12/08/2023 9.4 8.6 - 10.5 mg/dL Final   12/07/2023 9.6 8.6 - 10.5 mg/dL Final      PO4 No results found for: \"CAPO4\"   Albumin Albumin   Date Value Ref Range Status   12/07/2023 3.5 3.5 - 5.2 g/dL Final      Magnesium Magnesium   Date Value Ref Range Status   12/10/2023 2.6 (H) 1.6 - 2.4 mg/dL Final   12/09/2023 2.9 (H) 1.6 - 2.4 mg/dL Final      Uric Acid No results found for: \"URICACID\"        Results Review:     I reviewed the patient's new clinical results.    amLODIPine, 10 mg, Oral, Q24H  aspirin, 81 mg, Oral, Daily   Or  aspirin, 300 mg, Rectal, Daily  atenolol, 50 mg, Oral, Q24H  atorvastatin, 80 mg, Oral, Nightly  enoxaparin, 30 mg, Subcutaneous, Daily  sodium chloride, " 10 mL, Intravenous, Q12H      sodium chloride, 100 mL/hr, Last Rate: 100 mL/hr (12/10/23 0914)        Medication Review: Reviewed    Assessment & Plan     1) URSULA  2) CKD3 - Baseline Cr mid 1's  3) Severe hypernatremia - from dehydration  4) Hyperkalemia - resolved  5) UTI  6) DM  7) AMS  8) HTN  9) Metabolic acidosis  10) Anemia     PLAN: Cr improving towards baseline.  Na improved to 147.  Change to 1/2NS and continue IVF today.  Keep Lasix and KCl on hold.  Titrate Amlodipine to home dose.  Iron OK.  Avoid nephrotoxins.  ABX for UTI per primary.  Will follow closely.        Dmitry Lindsey MD  [facility]  12/10/23  12:52 EST

## 2023-12-10 NOTE — CASE MANAGEMENT/SOCIAL WORK
Continued Stay Note  LENKA Becker     Patient Name: Maddison Vasquez  MRN: [id number]  Today's Date: 12/10/2023    Admit Date: 12/7/2023    Plan: Return to [facility], No precert or PASRR required   Discharge Plan       Row Name 12/10/23 1026       Plan    Plan Comments DC barrier: restraints.                      Expected Discharge Date and Time       Expected Discharge Date Expected Discharge Time    Dec 11, 2023               Angelica Batista RN

## 2023-12-11 ENCOUNTER — APPOINTMENT (OUTPATIENT)
Dept: CARDIOLOGY | Facility: HOSPITAL | Age: 83
End: 2023-12-11
Payer: MEDICARE

## 2023-12-11 LAB
ANION GAP SERPL CALCULATED.3IONS-SCNC: 13 MMOL/L (ref 5–15)
BH CV UPPER VENOUS LEFT AXILLARY AUGMENT: NORMAL
BH CV UPPER VENOUS LEFT AXILLARY COMPRESS: NORMAL
BH CV UPPER VENOUS LEFT AXILLARY PHASIC: NORMAL
BH CV UPPER VENOUS LEFT AXILLARY SPONT: NORMAL
BH CV UPPER VENOUS LEFT BASILIC FOREARM COMPRESS: NORMAL
BH CV UPPER VENOUS LEFT BASILIC UPPER COMPRESS: NORMAL
BH CV UPPER VENOUS LEFT BRACHIAL COMPRESS: NORMAL
BH CV UPPER VENOUS LEFT CEPHALIC FOREARM COLOR: 1
BH CV UPPER VENOUS LEFT CEPHALIC FOREARM COMPRESS: NORMAL
BH CV UPPER VENOUS LEFT CEPHALIC FOREARM THROMBUS: NORMAL
BH CV UPPER VENOUS LEFT CEPHALIC UPPER COLOR: 1
BH CV UPPER VENOUS LEFT CEPHALIC UPPER COMPRESS: NORMAL
BH CV UPPER VENOUS LEFT CEPHALIC UPPER THROMBUS: NORMAL
BH CV UPPER VENOUS LEFT INTERNAL JUGULAR AUGMENT: NORMAL
BH CV UPPER VENOUS LEFT INTERNAL JUGULAR COMPRESS: NORMAL
BH CV UPPER VENOUS LEFT INTERNAL JUGULAR PHASIC: NORMAL
BH CV UPPER VENOUS LEFT INTERNAL JUGULAR SPONT: NORMAL
BH CV UPPER VENOUS LEFT RADIAL COMPRESS: NORMAL
BH CV UPPER VENOUS LEFT SUBCLAVIAN AUGMENT: NORMAL
BH CV UPPER VENOUS LEFT SUBCLAVIAN COMPRESS: NORMAL
BH CV UPPER VENOUS LEFT SUBCLAVIAN PHASIC: NORMAL
BH CV UPPER VENOUS LEFT SUBCLAVIAN SPONT: NORMAL
BH CV UPPER VENOUS LEFT ULNAR COMPRESS: NORMAL
BH CV UPPER VENOUS RIGHT INTERNAL JUGULAR AUGMENT: NORMAL
BH CV UPPER VENOUS RIGHT INTERNAL JUGULAR COMPRESS: NORMAL
BH CV UPPER VENOUS RIGHT INTERNAL JUGULAR PHASIC: NORMAL
BH CV UPPER VENOUS RIGHT INTERNAL JUGULAR SPONT: NORMAL
BH CV UPPER VENOUS RIGHT SUBCLAVIAN AUGMENT: NORMAL
BH CV UPPER VENOUS RIGHT SUBCLAVIAN COMPRESS: NORMAL
BH CV UPPER VENOUS RIGHT SUBCLAVIAN PHASIC: NORMAL
BH CV UPPER VENOUS RIGHT SUBCLAVIAN SPONT: NORMAL
BUN SERPL-MCNC: 56 MG/DL (ref 8–23)
BUN/CREAT SERPL: 40 (ref 7–25)
CALCIUM SPEC-SCNC: 8.5 MG/DL (ref 8.6–10.5)
CHLORIDE SERPL-SCNC: 109 MMOL/L (ref 98–107)
CO2 SERPL-SCNC: 18 MMOL/L (ref 22–29)
CREAT SERPL-MCNC: 1.4 MG/DL (ref 0.57–1)
EGFRCR SERPLBLD CKD-EPI 2021: 37.4 ML/MIN/1.73
GLUCOSE BLDC GLUCOMTR-MCNC: 111 MG/DL (ref 70–105)
GLUCOSE BLDC GLUCOMTR-MCNC: 118 MG/DL (ref 70–105)
GLUCOSE BLDC GLUCOMTR-MCNC: 134 MG/DL (ref 70–105)
GLUCOSE BLDC GLUCOMTR-MCNC: 150 MG/DL (ref 70–105)
GLUCOSE SERPL-MCNC: 131 MG/DL (ref 65–99)
POTASSIUM SERPL-SCNC: 4.3 MMOL/L (ref 3.5–5.2)
SODIUM SERPL-SCNC: 140 MMOL/L (ref 136–145)
WHOLE BLOOD HOLD SPECIMEN: NORMAL

## 2023-12-11 PROCEDURE — 25010000002 ENOXAPARIN PER 10 MG: Performed by: INTERNAL MEDICINE

## 2023-12-11 PROCEDURE — 99232 SBSQ HOSP IP/OBS MODERATE 35: CPT

## 2023-12-11 PROCEDURE — 92526 ORAL FUNCTION THERAPY: CPT

## 2023-12-11 PROCEDURE — 93971 EXTREMITY STUDY: CPT

## 2023-12-11 PROCEDURE — 82948 REAGENT STRIP/BLOOD GLUCOSE: CPT

## 2023-12-11 PROCEDURE — 80048 BASIC METABOLIC PNL TOTAL CA: CPT | Performed by: INTERNAL MEDICINE

## 2023-12-11 RX ADMIN — AMLODIPINE BESYLATE 10 MG: 5 TABLET ORAL at 08:51

## 2023-12-11 RX ADMIN — SODIUM CHLORIDE 100 ML/HR: 4.5 INJECTION, SOLUTION INTRAVENOUS at 15:30

## 2023-12-11 RX ADMIN — ATORVASTATIN CALCIUM 80 MG: 40 TABLET, FILM COATED ORAL at 20:14

## 2023-12-11 RX ADMIN — TRAZODONE HYDROCHLORIDE 50 MG: 50 TABLET ORAL at 20:14

## 2023-12-11 RX ADMIN — Medication 10 ML: at 20:14

## 2023-12-11 RX ADMIN — Medication 10 ML: at 08:52

## 2023-12-11 RX ADMIN — SODIUM CHLORIDE 100 ML/HR: 4.5 INJECTION, SOLUTION INTRAVENOUS at 05:28

## 2023-12-11 RX ADMIN — ASPIRIN 81 MG CHEWABLE TABLET 81 MG: 81 TABLET CHEWABLE at 08:51

## 2023-12-11 RX ADMIN — ATENOLOL 50 MG: 50 TABLET ORAL at 08:51

## 2023-12-11 RX ADMIN — ENOXAPARIN SODIUM 30 MG: 100 INJECTION SUBCUTANEOUS at 17:30

## 2023-12-11 NOTE — PLAN OF CARE
Problem: Fall Injury Risk  Goal: Absence of Fall and Fall-Related Injury  Outcome: Ongoing, Progressing  Intervention: Identify and Manage Contributors  Recent Flowsheet Documentation  Taken 12/11/2023 1122 by Mandy Singh LPN  Medication Review/Management: medications reviewed  Taken 12/11/2023 1000 by Mandy Singh LPN  Medication Review/Management: medications reviewed  Taken 12/11/2023 0802 by Mandy Singh LPN  Medication Review/Management: medications reviewed  Taken 12/11/2023 0800 by Mandy Singh LPN  Medication Review/Management: medications reviewed  Intervention: Promote Injury-Free Environment  Recent Flowsheet Documentation  Taken 12/11/2023 1122 by Latimer, Mandy A, LPN  Safety Promotion/Fall Prevention:   activity supervised   assistive device/personal items within reach   clutter free environment maintained   fall prevention program maintained   nonskid shoes/slippers when out of bed   safety round/check completed   room organization consistent  Taken 12/11/2023 1000 by Latimer, Mandy A, LPN  Safety Promotion/Fall Prevention:   activity supervised   assistive device/personal items within reach   clutter free environment maintained   fall prevention program maintained   nonskid shoes/slippers when out of bed   room organization consistent   safety round/check completed  Taken 12/11/2023 0802 by Latimer, Mandy A, LPN  Safety Promotion/Fall Prevention:   activity supervised   assistive device/personal items within reach   clutter free environment maintained   fall prevention program maintained   nonskid shoes/slippers when out of bed   room organization consistent   safety round/check completed  Taken 12/11/2023 0800 by Mandy Singh LPN  Safety Promotion/Fall Prevention:   activity supervised   assistive device/personal items within reach   clutter free environment maintained   fall prevention program maintained   nonskid shoes/slippers when out of bed   room  organization consistent   safety round/check completed     Problem: Skin Injury Risk Increased  Goal: Skin Health and Integrity  Outcome: Ongoing, Progressing  Intervention: Optimize Skin Protection  Recent Flowsheet Documentation  Taken 12/11/2023 0802 by Mandy Singh LPN  Pressure Reduction Techniques:   frequent weight shift encouraged   weight shift assistance provided  Pressure Reduction Devices:   positioning supports utilized   pressure-redistributing mattress utilized  Skin Protection:   adhesive use limited   tubing/devices free from skin contact     Problem: Restraint, Nonviolent  Goal: Absence of Harm or Injury  Outcome: Ongoing, Progressing  Intervention: Implement Least Restrictive Safety Strategies  Recent Flowsheet Documentation  Taken 12/11/2023 1220 by Mandy Singh LPN  Medical Device Protection: IV pole/bag removed from visual field  Less Restrictive Alternative:   bed alarm in use   environment adjusted   sensory stimulation limited  De-Escalation Techniques:   appropriate behavior reinforced   family involvement requested   reoriented   stimulation decreased  Diversional Activities: television  Taken 12/11/2023 1200 by Mandy Singh LPN  Medical Device Protection:   IV pole/bag removed from visual field   tubing secured  Less Restrictive Alternative:   bed alarm in use   environment adjusted   sensory stimulation limited  De-Escalation Techniques:   appropriate behavior reinforced   reoriented   stimulation decreased  Diversional Activities: television  Taken 12/11/2023 1122 by Mandy Singh LPN  Medical Device Protection:   IV pole/bag removed from visual field   tubing secured  Taken 12/11/2023 1000 by Mandy Singh LPN  Medical Device Protection:   IV pole/bag removed from visual field   tubing secured  Less Restrictive Alternative:   bed alarm in use   environment adjusted   sensory stimulation limited  De-Escalation Techniques:   appropriate behavior  reinforced   reoriented   stimulation decreased  Diversional Activities: television  Taken 12/11/2023 0802 by Mandy Singh LPN  Medical Device Protection:   IV pole/bag removed from visual field   tubing secured  Diversional Activities: television  Taken 12/11/2023 0800 by Mandy Singh LPN  Medical Device Protection:   IV pole/bag removed from visual field   tubing secured  Less Restrictive Alternative:   bed alarm in use   environment adjusted   sensory stimulation limited  De-Escalation Techniques:   appropriate behavior reinforced   reoriented   stimulation decreased  Diversional Activities: television  Intervention: Protect Dignity, Rights, and Personal Wellbeing  Recent Flowsheet Documentation  Taken 12/11/2023 1122 by Mandy Singh LPN  Trust Relationship/Rapport:   care explained   thoughts/feelings acknowledged  Taken 12/11/2023 0802 by Mandy Singh LPN  Trust Relationship/Rapport:   care explained   thoughts/feelings acknowledged     Problem: Behavior Regulation Impairment (Dementia Signs/Symptoms)  Goal: Improved Behavioral Control (Dementia Signs/Symptoms)  Outcome: Ongoing, Progressing     Problem: Cognitive Impairment (Dementia Signs/Symptoms)  Goal: Optimized Cognitive Function (Dementia Signs/Symptoms)  Outcome: Ongoing, Progressing     Problem: Mood Impairment (Dementia Signs/Symptoms)  Goal: Improved Mood Symptoms (Dementia Signs/Symptoms)  Outcome: Ongoing, Progressing     Problem: Nutrition Imbalance (Dementia Signs/Symptoms)  Goal: Optimized Nutrition Intake (Dementia Signs/Symptoms)  Outcome: Ongoing, Progressing     Problem: Sleep Disturbance (Dementia Signs/Symptoms)  Goal: Improved Sleep (Dementia Signs/Symptoms)  Outcome: Ongoing, Progressing     Problem: Social or Functional Impairment (Dementia Signs/Symptoms)  Goal: Enhanced Social or Functional Skills and Ability (Dementia Signs/Symptoms)  Outcome: Ongoing, Progressing  Intervention: Promote Social and  Functional Ability  Recent Flowsheet Documentation  Taken 12/11/2023 1122 by Mandy Singh LPN  Trust Relationship/Rapport:   care explained   thoughts/feelings acknowledged  Taken 12/11/2023 0802 by Mandy Singh LPN  Trust Relationship/Rapport:   care explained   thoughts/feelings acknowledged     Problem: Adult Inpatient Plan of Care  Goal: Plan of Care Review  Outcome: Ongoing, Progressing  Goal: Patient-Specific Goal (Individualized)  Outcome: Ongoing, Progressing  Goal: Absence of Hospital-Acquired Illness or Injury  Outcome: Ongoing, Progressing  Intervention: Identify and Manage Fall Risk  Recent Flowsheet Documentation  Taken 12/11/2023 1122 by Reading, Mandy A, LPN  Safety Promotion/Fall Prevention:   activity supervised   assistive device/personal items within reach   clutter free environment maintained   fall prevention program maintained   nonskid shoes/slippers when out of bed   safety round/check completed   room organization consistent  Taken 12/11/2023 1000 by Reading, Mandy A, LPN  Safety Promotion/Fall Prevention:   activity supervised   assistive device/personal items within reach   clutter free environment maintained   fall prevention program maintained   nonskid shoes/slippers when out of bed   room organization consistent   safety round/check completed  Taken 12/11/2023 0802 by Reading, Mandy A, LPN  Safety Promotion/Fall Prevention:   activity supervised   assistive device/personal items within reach   clutter free environment maintained   fall prevention program maintained   nonskid shoes/slippers when out of bed   room organization consistent   safety round/check completed  Taken 12/11/2023 0800 by Mandy Singh LPN  Safety Promotion/Fall Prevention:   activity supervised   assistive device/personal items within reach   clutter free environment maintained   fall prevention program maintained   nonskid shoes/slippers when out of bed   room organization consistent    safety round/check completed  Intervention: Prevent Skin Injury  Recent Flowsheet Documentation  Taken 12/11/2023 0802 by Mandy Singh LPN  Skin Protection:   adhesive use limited   tubing/devices free from skin contact  Intervention: Prevent and Manage VTE (Venous Thromboembolism) Risk  Recent Flowsheet Documentation  Taken 12/11/2023 1122 by Mandy Singh LPN  VTE Prevention/Management:   bilateral   sequential compression devices off   patient refused intervention  Taken 12/11/2023 0802 by Mandy Singh LPN  VTE Prevention/Management:   bilateral   sequential compression devices off   patient refused intervention  Intervention: Prevent Infection  Recent Flowsheet Documentation  Taken 12/11/2023 1122 by Mandy Singh LPN  Infection Prevention: hand hygiene promoted  Taken 12/11/2023 1000 by Mandy Singh LPN  Infection Prevention: hand hygiene promoted  Taken 12/11/2023 0802 by Mandy Singh LPN  Infection Prevention: hand hygiene promoted  Taken 12/11/2023 0800 by Mandy Singh LPN  Infection Prevention: hand hygiene promoted  Goal: Optimal Comfort and Wellbeing  Outcome: Ongoing, Progressing  Intervention: Provide Person-Centered Care  Recent Flowsheet Documentation  Taken 12/11/2023 1122 by Mandy Singh LPN  Trust Relationship/Rapport:   care explained   thoughts/feelings acknowledged  Taken 12/11/2023 0802 by Mandy Singh LPN  Trust Relationship/Rapport:   care explained   thoughts/feelings acknowledged  Goal: Readiness for Transition of Care  Outcome: Ongoing, Progressing   Goal Outcome Evaluation:

## 2023-12-11 NOTE — CONSULTS
Nutrition Services  Patient Name: Maddison Vasquez  YOB: 1940  MRN: [id number]  Admission date: 12/7/2023    Continue pureed diet and offer Boost Plus TID (provides 1080 kcals, 42 g protein if consumed)     Boost Glucose Control may be substituted for Boost Plus at this time, due to national shortage of many ONS products. If substituted, each Boost Glucose Control will provide 190 kcal and 16g PRO.    NUTRITION SCREENING      Trending Narrative: 12/11: Assessed due to MST score for unsure weight Hx. Pt unable to provide Hx, but per documentation, pt actually has restored/gained weight over the last 2 years, with stable weight in the past year near current wt. Accepting diet, though intake only about 50% on average. Will add ONS today to help meet needs.       PO Diet: Diet: Regular/House Diet; Texture: Pureed (NDD 1); Fluid Consistency: Thin (IDDSI 0)   PO Supplements: None ordered    Trending PO Intake:  Averaging about 50% intake at meals        Nutritionally-Pertinent Medications RDN Reviewed, C/W clinical course         Labs (reviewed below): Reviewed      Results from last 7 days   Lab Units 12/11/23  0024 12/10/23  0035 12/09/23  1323 12/08/23  1130 12/07/23  1355   SODIUM mmol/L 140 147* 155*   < > 152*   POTASSIUM mmol/L 4.3 4.3 4.5   < > 5.8*   CHLORIDE mmol/L 109* 114* 122*   < > 119*   CO2 mmol/L 18.0* 20.0* 22.0   < > 19.0*   BUN mg/dL 56* 72* 76*   < > 85*   CREATININE mg/dL 1.40* 1.57* 1.82*   < > 2.00*   CALCIUM mg/dL 8.5* 8.9 9.2   < > 9.6   BILIRUBIN mg/dL  --   --   --   --  0.2   ALK PHOS U/L  --   --   --   --  123*   ALT (SGPT) U/L  --   --   --   --  35*   AST (SGOT) U/L  --   --   --   --  27   GLUCOSE mg/dL 131* 169* 154*   < > 175*    < > = values in this interval not displayed.     Results from last 7 days   Lab Units 12/10/23  0035 12/09/23  1323 12/08/23  1130 12/08/23  1130   MAGNESIUM mg/dL 2.6* 2.9*  --   --    PHOSPHORUS mg/dL 4.4 5.1*   < >  --    HEMOGLOBIN g/dL 7.9*  "8.4*  --  8.9*   HEMATOCRIT % 25.3* 26.2*  --  27.8*   TRIGLYCERIDES mg/dL  --   --   --  135    < > = values in this interval not displayed.     Lab Results   Component Value Date    HGBA1C 6.00 (H) 12/08/2023          GI Function:  Stool Output  Bowel Incontinence: Yes (12/12/23 1700)  Stool Amount: moderate (12/12/23 1700)          Skin: Skin tear; no pressure injuries       Weight Review: Estimated body mass index is 26.08 kg/m² as calculated from the following:    Height as of this encounter: 154.9 cm (61\").    Weight as of this encounter: 62.6 kg (138 lb 0.1 oz).    Comment:   12/11: 62.6 kg -- within range for the last year; has restored/gained weight in the last 1-2 years    Wt Readings from Last 30 Encounters:   12/11/23 0357 62.6 kg (138 lb 0.1 oz)   12/10/23 0423 65 kg (143 lb 4.8 oz)   12/09/23 0300 60.8 kg (134 lb 0.6 oz)   12/07/23 1241 65.8 kg (145 lb)   12/07/23 1204 65.8 kg (145 lb)   12/02/23 1900 65.8 kg (145 lb)   02/11/22 1526 53.3 kg (117 lb 8.1 oz)   01/29/22 2300 57.6 kg (127 lb)   01/29/22 0500 53.9 kg (118 lb 14.4 oz)   01/28/22 0041 53.5 kg (117 lb 14.4 oz)   01/27/22 1647 51.9 kg (114 lb 6.7 oz)   10/12/21 0449 51.9 kg (114 lb 8 oz)   10/10/21 1930 51.5 kg (113 lb 9.6 oz)   10/10/21 1239 52.2 kg (115 lb)   12/16/20 2300 53.8 kg (118 lb 9.7 oz)   12/16/20 1711 63.5 kg (140 lb)   06/23/20 1802 62 kg (136 lb 11 oz)              Trending Physical   Appearance, NFPE 12/11: NFPE completed and not consistent with nutrition diagnosis of malnutrition at this time using AND/ASPEN criteria             Nutrition Problem Statement: Inadequate oral intake R/t decreased appetite in the setting of ongoing illness; as evidenced by intakes averaging only 50% at recent meals.         Nutrition Intervention: Continue pureed diet as tolerated.     Adding Boost Plus BID (Provides 720 kcals, 28 g protein if consumed)   Boost Glucose Control may be substituted for Boost Plus at this time, due to national " shortage of many ONS products. If substituted, each Boost Glucose Control will provide 190 kcal and 16g PRO.          Monitoring/Evaluation PO intake, Pertinent labs, Weight, Skin status, GI status, POC/GOC        RD to follow up per protocol.  Electronically signed by:  Lexie Justin RD  12/11/23 17:15 EST
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
## 2023-12-11 NOTE — PLAN OF CARE
Problem: Fall Injury Risk  Goal: Absence of Fall and Fall-Related Injury  Outcome: Ongoing, Progressing  Intervention: Identify and Manage Contributors  Recent Flowsheet Documentation  Taken 12/11/2023 0400 by Lexie Zamarripa RN  Medication Review/Management: medications reviewed  Taken 12/11/2023 0200 by Lexie Zamarripa RN  Medication Review/Management: medications reviewed  Taken 12/11/2023 0035 by Lexie Zamarripa RN  Medication Review/Management: medications reviewed  Taken 12/10/2023 2200 by Lexie Zamarripa RN  Medication Review/Management: medications reviewed  Taken 12/10/2023 2000 by Lexie Zamarripa RN  Medication Review/Management: medications reviewed  Intervention: Promote Injury-Free Environment  Recent Flowsheet Documentation  Taken 12/11/2023 0400 by Lexie Zamarripa RN  Safety Promotion/Fall Prevention:   assistive device/personal items within reach   clutter free environment maintained   fall prevention program maintained   lighting adjusted   mobility aid in reach   nonskid shoes/slippers when out of bed   room organization consistent   safety round/check completed  Taken 12/11/2023 0200 by Lexie Zamarripa RN  Safety Promotion/Fall Prevention:   assistive device/personal items within reach   clutter free environment maintained   fall prevention program maintained   lighting adjusted   mobility aid in reach   nonskid shoes/slippers when out of bed   safety round/check completed   room organization consistent  Taken 12/11/2023 0035 by Lexie Zamarripa RN  Safety Promotion/Fall Prevention:   assistive device/personal items within reach   clutter free environment maintained   fall prevention program maintained   lighting adjusted   mobility aid in reach   nonskid shoes/slippers when out of bed   room organization consistent   safety round/check completed  Taken 12/10/2023 2200 by Lexie Zamarripa RN  Safety Promotion/Fall Prevention:   assistive device/personal items within reach   clutter free environment maintained   fall prevention  program maintained   lighting adjusted   mobility aid in reach   nonskid shoes/slippers when out of bed   room organization consistent   safety round/check completed  Taken 12/10/2023 2000 by Lexie Zamarripa RN  Safety Promotion/Fall Prevention:   assistive device/personal items within reach   clutter free environment maintained   fall prevention program maintained   lighting adjusted   mobility aid in reach   nonskid shoes/slippers when out of bed   room organization consistent   safety round/check completed     Problem: Skin Injury Risk Increased  Goal: Skin Health and Integrity  Outcome: Ongoing, Progressing  Intervention: Optimize Skin Protection  Recent Flowsheet Documentation  Taken 12/11/2023 0400 by Lexie Zamarripa RN  Pressure Reduction Techniques:   frequent weight shift encouraged   weight shift assistance provided  Head of Bed (HOB) Positioning: HOB elevated  Pressure Reduction Devices: pressure-redistributing mattress utilized  Skin Protection:   adhesive use limited   tubing/devices free from skin contact   transparent dressing maintained  Taken 12/11/2023 0035 by Lexie Zamarripa RN  Pressure Reduction Techniques:   frequent weight shift encouraged   weight shift assistance provided  Head of Bed (HOB) Positioning: HOB elevated  Pressure Reduction Devices: pressure-redistributing mattress utilized  Skin Protection:   adhesive use limited   tubing/devices free from skin contact   transparent dressing maintained  Taken 12/10/2023 2000 by Lexie Zamarripa RN  Pressure Reduction Techniques:   weight shift assistance provided   pressure points protected  Head of Bed (HOB) Positioning: HOB elevated  Pressure Reduction Devices: pressure-redistributing mattress utilized  Skin Protection:   adhesive use limited   transparent dressing maintained   tubing/devices free from skin contact     Problem: Restraint, Nonviolent  Goal: Absence of Harm or Injury  Outcome: Ongoing, Progressing  Intervention: Implement Least Restrictive  Safety Strategies  Recent Flowsheet Documentation  Taken 12/11/2023 0400 by Lexie Zamarripa RN  Medical Device Protection:   IV pole/bag removed from visual field   tubing secured  Less Restrictive Alternative:   bed alarm in use   environment adjusted   sensory stimulation limited  De-Escalation Techniques:   appropriate behavior reinforced   increased round frequency   stimulation decreased  Diversional Activities: television  Taken 12/11/2023 0200 by Lexie Zamarripa RN  Medical Device Protection:   tubing secured   IV pole/bag removed from visual field  Less Restrictive Alternative:   bed alarm in use   environment adjusted   sensory stimulation limited  De-Escalation Techniques:   appropriate behavior reinforced   increased round frequency   quiet time facilitated   stimulation decreased  Diversional Activities: television  Taken 12/11/2023 0035 by Lexie Zamarripa RN  Medical Device Protection:   tubing secured   IV pole/bag removed from visual field  Diversional Activities: television  Taken 12/11/2023 0000 by Lexie Zamarripa RN  Medical Device Protection:   tubing secured   IV pole/bag removed from visual field  Less Restrictive Alternative:   bed alarm in use   environment adjusted   sensory stimulation limited  De-Escalation Techniques:   appropriate behavior reinforced   increased round frequency   stimulation decreased  Diversional Activities: television  Taken 12/10/2023 2200 by Lexie Zamarripa RN  Medical Device Protection:   tubing secured   IV pole/bag removed from visual field  Less Restrictive Alternative:   bed alarm in use   environment adjusted   sensory stimulation limited  De-Escalation Techniques:   appropriate behavior reinforced   increased round frequency   stimulation decreased  Diversional Activities: television  Taken 12/10/2023 2000 by Lexie Zamarripa RN  Medical Device Protection:   tubing secured   IV pole/bag removed from visual field  Less Restrictive Alternative:   bed alarm in use   environment adjusted    sensory stimulation limited  De-Escalation Techniques:   increased round frequency   reoriented   verbally redirected   stimulation decreased  Diversional Activities: television  Intervention: Protect Dignity, Rights, and Personal Wellbeing  Recent Flowsheet Documentation  Taken 12/10/2023 2000 by Lexie Zamarripa RN  Trust Relationship/Rapport:   care explained   thoughts/feelings acknowledged  Intervention: Protect Skin and Joint Integrity  Recent Flowsheet Documentation  Taken 12/11/2023 0400 by Lexie Zamarripa RN  Body Position: weight shifting  Range of Motion: active ROM (range of motion) encouraged  Taken 12/11/2023 0035 by Lexie Zamarripa RN  Body Position: weight shifting  Range of Motion:   ROM (range of motion) performed   active ROM (range of motion) encouraged  Taken 12/10/2023 2000 by Lexie Zamarripa RN  Body Position:   weight shifting   supine  Range of Motion:   ROM (range of motion) performed   active ROM (range of motion) encouraged     Problem: Behavior Regulation Impairment (Dementia Signs/Symptoms)  Goal: Improved Behavioral Control (Dementia Signs/Symptoms)  Outcome: Ongoing, Progressing     Problem: Cognitive Impairment (Dementia Signs/Symptoms)  Goal: Optimized Cognitive Function (Dementia Signs/Symptoms)  Outcome: Ongoing, Progressing     Problem: Mood Impairment (Dementia Signs/Symptoms)  Goal: Improved Mood Symptoms (Dementia Signs/Symptoms)  Outcome: Ongoing, Progressing     Problem: Nutrition Imbalance (Dementia Signs/Symptoms)  Goal: Optimized Nutrition Intake (Dementia Signs/Symptoms)  Outcome: Ongoing, Progressing     Problem: Sleep Disturbance (Dementia Signs/Symptoms)  Goal: Improved Sleep (Dementia Signs/Symptoms)  Outcome: Ongoing, Progressing     Problem: Social or Functional Impairment (Dementia Signs/Symptoms)  Goal: Enhanced Social or Functional Skills and Ability (Dementia Signs/Symptoms)  Outcome: Ongoing, Progressing  Intervention: Promote Social and Functional Ability  Recent Flowsheet  Documentation  Taken 12/10/2023 2000 by Lexie Zamarripa RN  Trust Relationship/Rapport:   care explained   thoughts/feelings acknowledged     Problem: Adult Inpatient Plan of Care  Goal: Plan of Care Review  Outcome: Ongoing, Progressing  Goal: Patient-Specific Goal (Individualized)  Outcome: Ongoing, Progressing  Goal: Absence of Hospital-Acquired Illness or Injury  Outcome: Ongoing, Progressing  Intervention: Identify and Manage Fall Risk  Recent Flowsheet Documentation  Taken 12/11/2023 0400 by Lexie Zamarripa RN  Safety Promotion/Fall Prevention:   assistive device/personal items within reach   clutter free environment maintained   fall prevention program maintained   lighting adjusted   mobility aid in reach   nonskid shoes/slippers when out of bed   room organization consistent   safety round/check completed  Taken 12/11/2023 0200 by Lexie Zamarripa RN  Safety Promotion/Fall Prevention:   assistive device/personal items within reach   clutter free environment maintained   fall prevention program maintained   lighting adjusted   mobility aid in reach   nonskid shoes/slippers when out of bed   safety round/check completed   room organization consistent  Taken 12/11/2023 0035 by Lexie Zamarripa RN  Safety Promotion/Fall Prevention:   assistive device/personal items within reach   clutter free environment maintained   fall prevention program maintained   lighting adjusted   mobility aid in reach   nonskid shoes/slippers when out of bed   room organization consistent   safety round/check completed  Taken 12/10/2023 2200 by Lexie Zamarripa RN  Safety Promotion/Fall Prevention:   assistive device/personal items within reach   clutter free environment maintained   fall prevention program maintained   lighting adjusted   mobility aid in reach   nonskid shoes/slippers when out of bed   room organization consistent   safety round/check completed  Taken 12/10/2023 2000 by Lexie Zamarripa RN  Safety Promotion/Fall Prevention:   assistive  device/personal items within reach   clutter free environment maintained   fall prevention program maintained   lighting adjusted   mobility aid in reach   nonskid shoes/slippers when out of bed   room organization consistent   safety round/check completed  Intervention: Prevent Skin Injury  Recent Flowsheet Documentation  Taken 12/11/2023 0400 by Lexie Zamarripa RN  Body Position: weight shifting  Skin Protection:   adhesive use limited   tubing/devices free from skin contact   transparent dressing maintained  Taken 12/11/2023 0035 by Lexie Zamarripa RN  Body Position: weight shifting  Skin Protection:   adhesive use limited   tubing/devices free from skin contact   transparent dressing maintained  Taken 12/10/2023 2000 by Lexie Zamarripa RN  Body Position:   weight shifting   supine  Skin Protection:   adhesive use limited   transparent dressing maintained   tubing/devices free from skin contact  Intervention: Prevent and Manage VTE (Venous Thromboembolism) Risk  Recent Flowsheet Documentation  Taken 12/11/2023 0400 by Lexie Zamarripa RN  Range of Motion: active ROM (range of motion) encouraged  Taken 12/11/2023 0035 by Lexie Zamarripa RN  Range of Motion:   ROM (range of motion) performed   active ROM (range of motion) encouraged  Taken 12/10/2023 2000 by Lexie Zamarripa RN  Activity Management: bedrest  VTE Prevention/Management:   bilateral   sequential compression devices off   patient refused intervention  Range of Motion:   ROM (range of motion) performed   active ROM (range of motion) encouraged  Intervention: Prevent Infection  Recent Flowsheet Documentation  Taken 12/11/2023 0400 by Lexie Zamarripa RN  Infection Prevention:   equipment surfaces disinfected   hand hygiene promoted   personal protective equipment utilized   rest/sleep promoted   single patient room provided  Taken 12/11/2023 0200 by Lexie Zamarripa RN  Infection Prevention:   equipment surfaces disinfected   personal protective equipment utilized   hand hygiene promoted    rest/sleep promoted   single patient room provided  Taken 12/11/2023 0035 by Lexie Zamarripa RN  Infection Prevention:   equipment surfaces disinfected   hand hygiene promoted   personal protective equipment utilized   rest/sleep promoted   single patient room provided  Taken 12/10/2023 2200 by Lexie Zamarripa RN  Infection Prevention:   equipment surfaces disinfected   hand hygiene promoted   personal protective equipment utilized   rest/sleep promoted   single patient room provided  Taken 12/10/2023 2000 by Lexie Zamarripa RN  Infection Prevention:   equipment surfaces disinfected   hand hygiene promoted   personal protective equipment utilized   rest/sleep promoted   single patient room provided  Goal: Optimal Comfort and Wellbeing  Outcome: Ongoing, Progressing  Intervention: Monitor Pain and Promote Comfort  Recent Flowsheet Documentation  Taken 12/10/2023 2000 by Lexie Zamarripa RN  Pain Management Interventions: position adjusted  Intervention: Provide Person-Centered Care  Recent Flowsheet Documentation  Taken 12/10/2023 2000 by Lexie Zamarripa RN  Trust Relationship/Rapport:   care explained   thoughts/feelings acknowledged  Goal: Readiness for Transition of Care  Outcome: Ongoing, Progressing   Goal Outcome Evaluation:

## 2023-12-11 NOTE — PROGRESS NOTES
"RENAL/KCC:     LOS: 4 days    Patient Care Team:  Provider, No Known as PCP - General    Chief Complaint:  URSULA, Hypernatremia    Subjective     Interval History:   Chart reviewed  ROS unobtainable    Objective     Vital Sign Min/Max for last 24 hours  Temp  Min: 97.1 °F (36.2 °C)  Max: 98.6 °F (37 °C)   BP  Min: 130/54  Max: 164/63   Pulse  Min: 70  Max: 82   Resp  Min: 14  Max: 26   SpO2  Min: 96 %  Max: 100 %   No data recorded   Weight  Min: 62.6 kg (138 lb 0.1 oz)  Max: 62.6 kg (138 lb 0.1 oz)     Flowsheet Rows      Flowsheet Row First Filed Value   Admission Height 154.9 cm (61\") Documented at 12/07/2023 1204   Admission Weight 65.8 kg (145 lb) Documented at 12/07/2023 1204            No intake/output data recorded.  I/O last 3 completed shifts:  In: 1121.7 [P.O.:120; I.V.:1001.7]  Out: 700 [Urine:700]    Physical Exam:  GEN: NAD  ENT: PERRL, EOMI, MMM  NECK: Supple, no JVD  CHEST: CTAB, no W/R/C  CV: RRR, no M/G/R  ABD: Soft, NT, +BS  SKIN: Warm and Dry  NEURO: Lethargic        WBC WBC   Date Value Ref Range Status   12/10/2023 7.70 3.40 - 10.80 10*3/mm3 Final   12/09/2023 9.00 3.40 - 10.80 10*3/mm3 Final   12/08/2023 10.30 3.40 - 10.80 10*3/mm3 Final      HGB Hemoglobin   Date Value Ref Range Status   12/10/2023 7.9 (L) 12.0 - 15.9 g/dL Final   12/09/2023 8.4 (L) 12.0 - 15.9 g/dL Final   12/08/2023 8.9 (L) 12.0 - 15.9 g/dL Final      HCT Hematocrit   Date Value Ref Range Status   12/10/2023 25.3 (L) 34.0 - 46.6 % Final   12/09/2023 26.2 (L) 34.0 - 46.6 % Final   12/08/2023 27.8 (L) 34.0 - 46.6 % Final      Platlets No results found for: \"LABPLAT\"   MCV MCV   Date Value Ref Range Status   12/10/2023 99.7 (H) 79.0 - 97.0 fL Final   12/09/2023 102.0 (H) 79.0 - 97.0 fL Final   12/08/2023 101.5 (H) 79.0 - 97.0 fL Final          Sodium Sodium   Date Value Ref Range Status   12/11/2023 140 136 - 145 mmol/L Final   12/10/2023 147 (H) 136 - 145 mmol/L Final   12/09/2023 155 (H) 136 - 145 mmol/L Final   12/08/2023 158 " "(H) 136 - 145 mmol/L Final      Potassium Potassium   Date Value Ref Range Status   12/11/2023 4.3 3.5 - 5.2 mmol/L Final   12/10/2023 4.3 3.5 - 5.2 mmol/L Final   12/09/2023 4.5 3.5 - 5.2 mmol/L Final   12/08/2023 4.8 3.5 - 5.2 mmol/L Final      Chloride Chloride   Date Value Ref Range Status   12/11/2023 109 (H) 98 - 107 mmol/L Final   12/10/2023 114 (H) 98 - 107 mmol/L Final   12/09/2023 122 (H) 98 - 107 mmol/L Final   12/08/2023 124 (H) 98 - 107 mmol/L Final      CO2 CO2   Date Value Ref Range Status   12/11/2023 18.0 (L) 22.0 - 29.0 mmol/L Final   12/10/2023 20.0 (L) 22.0 - 29.0 mmol/L Final   12/09/2023 22.0 22.0 - 29.0 mmol/L Final   12/08/2023 20.0 (L) 22.0 - 29.0 mmol/L Final      BUN BUN   Date Value Ref Range Status   12/11/2023 56 (H) 8 - 23 mg/dL Final   12/10/2023 72 (H) 8 - 23 mg/dL Final   12/09/2023 76 (H) 8 - 23 mg/dL Final   12/08/2023 85 (H) 8 - 23 mg/dL Final      Creatinine Creatinine   Date Value Ref Range Status   12/11/2023 1.40 (H) 0.57 - 1.00 mg/dL Final   12/10/2023 1.57 (H) 0.57 - 1.00 mg/dL Final   12/09/2023 1.82 (H) 0.57 - 1.00 mg/dL Final   12/08/2023 1.99 (H) 0.57 - 1.00 mg/dL Final      Calcium Calcium   Date Value Ref Range Status   12/11/2023 8.5 (L) 8.6 - 10.5 mg/dL Final   12/10/2023 8.9 8.6 - 10.5 mg/dL Final   12/09/2023 9.2 8.6 - 10.5 mg/dL Final   12/08/2023 9.4 8.6 - 10.5 mg/dL Final      PO4 No results found for: \"CAPO4\"   Albumin No results found for: \"ALBUMIN\"     Magnesium Magnesium   Date Value Ref Range Status   12/10/2023 2.6 (H) 1.6 - 2.4 mg/dL Final   12/09/2023 2.9 (H) 1.6 - 2.4 mg/dL Final      Uric Acid No results found for: \"URICACID\"        Results Review:     I reviewed the patient's new clinical results.    amLODIPine, 10 mg, Oral, Q24H  aspirin, 81 mg, Oral, Daily   Or  aspirin, 300 mg, Rectal, Daily  atenolol, 50 mg, Oral, Q24H  atorvastatin, 80 mg, Oral, Nightly  enoxaparin, 30 mg, Subcutaneous, Daily  sodium chloride, 10 mL, Intravenous, " Q12H  traZODone, 50 mg, Oral, Nightly      sodium chloride, 100 mL/hr, Last Rate: 100 mL/hr (12/11/23 0528)        Medication Review: Reviewed    Assessment & Plan     1) URSUAL  2) CKD3 - Baseline Cr mid 1's  3) Severe hypernatremia - from dehydration  4) Hyperkalemia - resolved  5) UTI  6) DM  7) AMS  8) HTN  9) Metabolic acidosis  10) Anemia     PLAN: Cr improving towards baseline.  Na normalized.  Continue 1/2NS for another L then stop.  ABX for UTI per primary.  Family to discuss goals of care with Palliative today.  Poor prognosis.         Dmitry Lindsey MD  [facility]  12/11/23  08:56 EST

## 2023-12-11 NOTE — THERAPY TREATMENT NOTE
Acute Care - Speech Language Pathology Treatment Note     Eugenio     Patient Name: Maddison Vasquez  : 1940  MRN: [id number]    Today's Date: 2023                   Admit Date: 2023       Visit Dx:      ICD-10-CM ICD-9-CM   1. Acute renal failure, unspecified acute renal failure type  N17.9 584.9   2. UTI (urinary tract infection), uncomplicated  N39.0 599.0   3. Altered mental status, unspecified altered mental status type  R41.82 780.97   4. Hypernatremia  E87.0 276.0   5. Hyperkalemia  E87.5 276.7       Patient Active Problem List   Diagnosis    Hypertension    Seizures    Dementia    Lung nodule < 6cm on CT    Thoracic spine fracture    Hyponatremia    Anemia    Head contusion    Closed fracture of proximal end of left humerus    Closed Colles' fracture of left radius    Fall    URSULA (acute kidney injury)       Past Medical History:   Diagnosis Date    Dementia     Hearing loss     Hypertension     Normochromic normocytic anemia     Seizures     Stage III chronic kidney disease        Past Surgical History:   Procedure Laterality Date    BACK SURGERY      BRAIN SURGERY      ORIF HUMERUS FRACTURE Left 2022    Procedure: HUMERUS PROXIMAL OPEN REDUCTION INTERNAL FIXATION;  Surgeon: Bulmaro Wallace MD;  Location: [facility] OR;  Service: Orthopedics;  Laterality: Left;    ORIF WRIST FRACTURE Left 2022    Procedure: WRIST OPEN REDUCTION INTERNAL FIXATION;  Surgeon: Bulmaro Wallace MD;  Location: [facility] OR;  Service: Orthopedics;  Laterality: Left;     Skilled ST intervention conducted this date targeting dysphagia in the setting of URSULA, history of dementia and dysphagia.  Pt alert and pleasant.   Pt on room air during session. Pt currently prescribed a Pureed and Thin liquids diet at this time     Treatment narrative/results: The patient was seen bedside today for skilled dysphagia therapy. Upon entrance to the patient's room she was sitting up in bed, however leaning slightly to  "the right. She was awake and responsive. She allowed head of bed to be elevated to a more 80-90 degree angle. She is currently receiving a puree/thin liquid diet and was amenable to trials offered by clinician today to include thin water by straw (x approximately 10 ounces) and puree (applesauce) x 3 trials. Labial seal on spoon and straw are adequate. She is able to pull thin liquid via straw well. No anterior labial spillage noted on either consistency. She did present with slow oral transit and slight \"struggle\" to elicit a swallow. She denied pain when swallowing when asked. Mechanical soft and regular solids not assessed this date due to the above, as well as edentulous state. She was noted to elicit a cough/throat clear after the first sip of thin water, however following this no other signs/symptoms of difficulty present or appreciated. Discussed with nursing staff. She reported that the patient's POA is to meet with Palliative Care today to discuss possible comfort care.     SLP Recommendation and Plan:  It is recommended that the patient continue her current diet of puree/thin liquids at this time  She should be up at a full 90 degree angle for all meals/medications  Monitor closely for any overt s/s of difficulty  Assist and/or feed patient all po at this time  Await clarification of goals of care pending meeting with Palliative Care.           EDUCATION    The patient has been educated in the following areas:     No family or caregivers present. Discussed with nursing staff.       Dysphagia (Swallowing Impairment) Modified Diet Instruction.             SLP GOALS       Row Name 12/11/23 1500       (LTG) Swallow    (LTG) Swallow Pt will maximize swallow function for least restrictive PO diet, exhibiting no complication associated with dysphagia, adequate PO intake, and demonstrating independent use of swallow compensations  -SM    [place] (Swallow Long Term Goal) with moderate cues (50-74% accuracy)  " -SM    Time Frame (Swallow Long Term Goal) by discharge  -SM    Progress/Outcomes (Swallow Long Term Goal) goal ongoing  -SM    Comment (Swallow Long Term Goal) See above  -SM       (STG) Swallow 1    (STG) Swallow 1 The patient will participate in a meal/follow-up assessment to determine safety and adequacy of recommended diet, independent use of safe swallow compensations, pt/family education and additional goals/recommendations to follow  -SM    [place] (Swallow Short Term Goal 1) with maximum cues (25-49% accuracy)  -SM    Time Frame (Swallow Short Term Goal 1) 1 week  -SM    Progress/Outcomes (Swallow Short Term Goal 1) goal ongoing  -SM    Comment (Swallow Short Term Goal 1) See above  -SM       (STG) Swallow 2    (STG) Swallow 2 Pt will participate in ongoing swallow assessment to include VFSS if indicated, and caregiver teaching.  -SM    [place] (Swallow Short Term Goal 2) with maximum cues (25-49% accuracy)  -SM    Time Frame (Swallow Short Term Goal 2) 1 week  -SM    Progress/Outcomes (Swallow Short Term Goal 2) goal ongoing  -SM    Comment (Swallow Short Term Goal 2) See above  -SM              User Key  (r) = Recorded By, (t) = Taken By, (c) = Cosigned By      Initials Name Provider Type    Ginger Luong, SLP Speech and Language Pathologist                      Time Calculation:               HARDY Murphy  12/11/2023
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
## 2023-12-11 NOTE — SIGNIFICANT NOTE
12/11/23 1157   OTHER   Discipline physical therapist   Rehab Time/Intention   Session Not Performed unable to treat, medical status change  (Per RN, pt still combative and in restraints. Potential Hospice consult. PT holding until appropriate for participation.)   Therapy Assessment/Plan (PT)   Criteria for Skilled Interventions Met (PT) yes;meets criteria   Recommendation   PT - Next Appointment 12/12/23

## 2023-12-11 NOTE — SIGNIFICANT NOTE
12/11/23 1239   OTHER   Discipline occupational therapist   Rehab Time/Intention   Session Not Performed unable to treat, medical status change  (Per RN, pt continues to be agitated & in restraints. Will hold OT tx this date & follow up on next business day as medically appropriate.)   Therapy Assessment/Plan (PT)   Criteria for Skilled Interventions Met (PT) yes;meets criteria   Recommendation   OT - Next Appointment 12/12/23

## 2023-12-11 NOTE — CONSULTS
Palliative Care Social Work Progress Note    Code Status:do not resuscitate    Goals of Care: Full Treatment    Narrative: Palliative care  contacted granddaniko Thorpe and she requested a call back after work.  to attempt to contact family  again tomorrow    Plan: Palliative care following          Nichole Kimball

## 2023-12-12 LAB
ANION GAP SERPL CALCULATED.3IONS-SCNC: 11 MMOL/L (ref 5–15)
BACTERIA SPEC AEROBE CULT: NORMAL
BACTERIA SPEC AEROBE CULT: NORMAL
BACTERIA UR QL AUTO: ABNORMAL /HPF
BILIRUB UR QL STRIP: NEGATIVE
BUN SERPL-MCNC: 44 MG/DL (ref 8–23)
BUN/CREAT SERPL: 29.7 (ref 7–25)
CALCIUM SPEC-SCNC: 8.4 MG/DL (ref 8.6–10.5)
CHLORIDE SERPL-SCNC: 112 MMOL/L (ref 98–107)
CLARITY UR: CLEAR
CO2 SERPL-SCNC: 16 MMOL/L (ref 22–29)
COLOR UR: YELLOW
CREAT SERPL-MCNC: 1.48 MG/DL (ref 0.57–1)
EGFRCR SERPLBLD CKD-EPI 2021: 35 ML/MIN/1.73
GLUCOSE BLDC GLUCOMTR-MCNC: 154 MG/DL (ref 70–105)
GLUCOSE BLDC GLUCOMTR-MCNC: 166 MG/DL (ref 70–105)
GLUCOSE BLDC GLUCOMTR-MCNC: 194 MG/DL (ref 70–105)
GLUCOSE SERPL-MCNC: 177 MG/DL (ref 65–99)
GLUCOSE UR STRIP-MCNC: ABNORMAL MG/DL
HGB UR QL STRIP.AUTO: ABNORMAL
HYALINE CASTS UR QL AUTO: ABNORMAL /LPF
KETONES UR QL STRIP: NEGATIVE
LEUKOCYTE ESTERASE UR QL STRIP.AUTO: NEGATIVE
NITRITE UR QL STRIP: NEGATIVE
PH UR STRIP.AUTO: 5.5 [PH] (ref 5–8)
POTASSIUM SERPL-SCNC: 4.9 MMOL/L (ref 3.5–5.2)
PROT UR QL STRIP: ABNORMAL
RBC # UR STRIP: ABNORMAL /HPF
REF LAB TEST METHOD: ABNORMAL
SODIUM SERPL-SCNC: 139 MMOL/L (ref 136–145)
SP GR UR STRIP: 1.01 (ref 1–1.03)
SQUAMOUS #/AREA URNS HPF: ABNORMAL /HPF
UROBILINOGEN UR QL STRIP: ABNORMAL
WBC # UR STRIP: ABNORMAL /HPF

## 2023-12-12 PROCEDURE — 92526 ORAL FUNCTION THERAPY: CPT

## 2023-12-12 PROCEDURE — 97530 THERAPEUTIC ACTIVITIES: CPT

## 2023-12-12 PROCEDURE — 82948 REAGENT STRIP/BLOOD GLUCOSE: CPT

## 2023-12-12 PROCEDURE — 97110 THERAPEUTIC EXERCISES: CPT | Performed by: OCCUPATIONAL THERAPIST

## 2023-12-12 PROCEDURE — 97112 NEUROMUSCULAR REEDUCATION: CPT

## 2023-12-12 PROCEDURE — 81001 URINALYSIS AUTO W/SCOPE: CPT | Performed by: INTERNAL MEDICINE

## 2023-12-12 PROCEDURE — 97535 SELF CARE MNGMENT TRAINING: CPT | Performed by: OCCUPATIONAL THERAPIST

## 2023-12-12 PROCEDURE — 97112 NEUROMUSCULAR REEDUCATION: CPT | Performed by: OCCUPATIONAL THERAPIST

## 2023-12-12 PROCEDURE — 80048 BASIC METABOLIC PNL TOTAL CA: CPT | Performed by: INTERNAL MEDICINE

## 2023-12-12 PROCEDURE — 25010000002 CEFTRIAXONE PER 250 MG: Performed by: INTERNAL MEDICINE

## 2023-12-12 PROCEDURE — 25010000002 ENOXAPARIN PER 10 MG: Performed by: INTERNAL MEDICINE

## 2023-12-12 RX ORDER — SODIUM BICARBONATE 650 MG/1
1300 TABLET ORAL 2 TIMES DAILY
Status: DISCONTINUED | OUTPATIENT
Start: 2023-12-12 | End: 2023-12-13

## 2023-12-12 RX ADMIN — Medication 10 ML: at 22:55

## 2023-12-12 RX ADMIN — SODIUM BICARBONATE 1300 MG: 650 TABLET ORAL at 14:20

## 2023-12-12 RX ADMIN — Medication 10 ML: at 08:39

## 2023-12-12 RX ADMIN — ATENOLOL 50 MG: 50 TABLET ORAL at 08:39

## 2023-12-12 RX ADMIN — ATORVASTATIN CALCIUM 80 MG: 40 TABLET, FILM COATED ORAL at 22:52

## 2023-12-12 RX ADMIN — CEFTRIAXONE 1000 MG: 1 INJECTION, POWDER, FOR SOLUTION INTRAMUSCULAR; INTRAVENOUS at 14:19

## 2023-12-12 RX ADMIN — ENOXAPARIN SODIUM 30 MG: 100 INJECTION SUBCUTANEOUS at 16:25

## 2023-12-12 RX ADMIN — AMLODIPINE BESYLATE 10 MG: 5 TABLET ORAL at 08:39

## 2023-12-12 RX ADMIN — ASPIRIN 81 MG CHEWABLE TABLET 81 MG: 81 TABLET CHEWABLE at 08:39

## 2023-12-12 RX ADMIN — SODIUM BICARBONATE 1300 MG: 650 TABLET ORAL at 22:52

## 2023-12-12 RX ADMIN — TRAZODONE HYDROCHLORIDE 50 MG: 50 TABLET ORAL at 22:52

## 2023-12-12 NOTE — PLAN OF CARE
"Goal Outcome Evaluation:   Assessment: Maddison Vasquez presents with ADL impairments affecting function including balance, cognition, coordination, endurance / activity tolerance, grasp, muscle tone abnormal, pain, postural / trunk control, range of motion (ROM), and strength.  Pt alert this day & tolerating & participating with EOB sitting & feeding tasks. Spoke with nursing after tx & since pt not agitated, bilateral wrist restraints were left off at this time per nursing approval. Demonstrated functioning below baseline abilities indicate the need for continued skilled intervention while inpatient. Tolerating session today without incident. Will continue to follow and progress as tolerated.     Plan/Recommendations:   Moderate Intensity Therapy recommended post-acute care. This is recommended as therapy feels the patient would require 3-4 days per week and wouldn't tolerate \"3 hour daily\" rehab intensity. SNF would be the preferred choice. If the patient does not agree to SNF, arrange HH or OP depending on home bound status. If patient is medically complex, consider LTACH.. Pt requires no DME at discharge.     Pt desires Skilled Rehab placement at discharge. Pt cooperative; agreeable to therapeutic recommendations and plan of care.                        "

## 2023-12-12 NOTE — PLAN OF CARE
"Goal Outcome Evaluation:     Bed mobility - Max-A and Assist x 2  pt had difficulty with initiating movement and unsure if pt knew what therapy was asking her to do. Max of 2 to scoot out for feet to touch floor.  Pt needed assist to maintain sitting and pt was briefly able to sit by herself.  Pt sat at edge of the bed for about 10 minutes with pt performing some forward reaching. Pt does lean to the right in supine but to the left at edge of the bed.    Transfers - N/A or Not attempted.  Ambulation - N/A or Not attempted.      If medically appropriate, Moderate Intensity Therapy recommended post-acute care. This is recommended as therapy feels the patient would require 3-4 days per week and wouldn't tolerate \"3 hour daily\" rehab intensity. SNF would be the preferred choice.  Pt requires no DME at discharge.     Pt desires Skilled Rehab placement at discharge. Pt cooperative; agreeable to therapeutic recommendations and plan of care.              "

## 2023-12-12 NOTE — CONSULTS
Palliative Care Social Work Progress Note    Code Status:do not resuscitate    Goals of Care: Full Treatment    Narrative: Palliative care  contacted patients granddaughter to discuss goals of care. She states doctors have discussed hospice for patient and she thinks that would be appropriate given patients condition. She would like patient to return to [place] and requested a referral to [facility]. Referral made to [facility]. DRU and RN notified. Emotional support provided    Plan: [facility] referral          Nichole Kimball

## 2023-12-12 NOTE — THERAPY TREATMENT NOTE
"Subjective: Pt agreeable to therapeutic plan of care.    Objective:     Bed mobility - Max-A and Assist x 2  pt had difficulty with initiating movement and unsure if pt knew what therapy was asking her to do. Max of 2 to scoot out for feet to touch floor.  Pt needed assist to maintain sitting and pt was briefly able to sit by herself.  Pt sat at edge of the bed for about 10 minutes with assist to maintain static sitting but did have brief period of no support. Pt does lean to the right in supine but to the left at edge of the bed.    Transfers - N/A or Not attempted.  Ambulation - N/A or Not attempted.    Vitals: WNL    Pain: 0 VAS   Location: pt did not yell out in pain during transfer to edge of the bed and back to supine.   Intervention for pain: N/A    Education: Provided education on the importance of mobility in the acute care setting, Verbal/Tactile Cues, and Transfer Training    Assessment: Maddison Vasquez presents with functional mobility impairments which indicate the need for skilled intervention. Tolerating session today without incident.  Pt was much more alert and following very few directions.  Pt tolerated sitting at at edge of the bed today without increased pain.  Pt still with no active movement to LUE or LLE. Pt was not safe to attempt standing.  Will continue to follow and progress as tolerated.     Plan/Recommendations:   If medically appropriate, Moderate Intensity Therapy recommended post-acute care. This is recommended as therapy feels the patient would require 3-4 days per week and wouldn't tolerate \"3 hour daily\" rehab intensity. SNF would be the preferred choice.  Pt requires no DME at discharge.     Pt desires Skilled Rehab placement at discharge. Pt cooperative; agreeable to therapeutic recommendations and plan of care.     Basic Mobility 6-click:  Rollin = Total, A lot = 2, A little = 3; 4 = None  Supine>Sit:   1 = Total, A lot = 2, A little = 3; 4 = None   Sit>Stand with " arms:  1 = Total, A lot = 2, A little = 3; 4 = None  Bed>Chair:   1 = Total, A lot = 2, A little = 3; 4 = None  Ambulate in room:  1 = Total, A lot = 2, A little = 3; 4 = None  3-5 Steps with railin = Total, A lot = 2, A little = 3; 4 = None  Score: 8    Modified [place]: 5 = Severe disability (Requires constant nursing care and attention, bedridden, incontinent)    Post-Tx Position: Supine with HOB Elevated, Alarms activated, and Call light and personal items within reach  PPE: gloves, surgical mask, and gown

## 2023-12-12 NOTE — PLAN OF CARE
Problem: Fall Injury Risk  Goal: Absence of Fall and Fall-Related Injury  Outcome: Ongoing, Progressing  Intervention: Identify and Manage Contributors  Description: Develop a fall prevention plan with the patient and caregiver/family.  Provide reorientation, appropriate sensory stimulation and routines with changes in mental status to decrease risk of fall.  Promote use of personal vision and auditory aids.  Assess assistance level required for safe and effective self-care; provide support as needed, such as toileting, mobilization. For age 65 and older, implement timed toileting with assistance.  Encourage physical activity, such as performance of mobility and self-care at highest level of patient ability, multicomponent exercise program and provision of appropriate assistive devices.  If fall occurs, assess the severity of injury; implement fall injury protocol. Determine the cause and revise fall injury prevention plan.  Regularly review medication contribution to fall risk; adjust medication administration times to minimize risk of falling.  Consider risk related to polypharmacy and age.  Balance adequate pain management with potential for oversedation.  Recent Flowsheet Documentation  Taken 12/12/2023 0800 by Julieta Bailon RN  Medication Review/Management:   medications reviewed   high-risk medications identified  Intervention: Promote Injury-Free Environment  Description: Provide a safe, barrier-free environment that encourages independent activity.  Keep care area uncluttered and well-lighted.  Determine need for increased observation or monitoring.  Avoid use of devices that minimize mobility, such as restraints or indwelling urinary catheter.  Recent Flowsheet Documentation  Taken 12/12/2023 1600 by Julieta Bailon RN  Safety Promotion/Fall Prevention:   activity supervised   safety round/check completed  Taken 12/12/2023 1200 by Julieta Bailon RN  Safety Promotion/Fall Prevention:   activity  supervised   safety round/check completed  Taken 12/12/2023 1000 by Julieta Bailon, RN  Safety Promotion/Fall Prevention:   activity supervised   safety round/check completed  Taken 12/12/2023 0800 by Julieta Bailon, RN  Safety Promotion/Fall Prevention:   activity supervised   safety round/check completed   fall prevention program maintained   Goal Outcome Evaluation:

## 2023-12-12 NOTE — CASE MANAGEMENT/SOCIAL WORK
Continued Stay Note  LENKA Becker     Patient Name: Maddison Vasquez  MRN: [id number]  Today's Date: 12/12/2023    Admit Date: 12/7/2023    Plan: D/C Plan: [facility] referral   Discharge Plan       Row Name 12/12/23 1309       Plan    Plan D/C Plan: [facility] referral    Plan Comments D/C Barrier: poor po intake; Restraints.                 Expected Discharge Date and Time       Expected Discharge Date Expected Discharge Time    Dec 14, 2023               Jessi Chavez RN

## 2023-12-12 NOTE — PLAN OF CARE
Problem: Fall Injury Risk  Goal: Absence of Fall and Fall-Related Injury  Outcome: Ongoing, Progressing  Intervention: Identify and Manage Contributors  Recent Flowsheet Documentation  Taken 12/12/2023 0440 by Lexie Zamarripa RN  Medication Review/Management: medications reviewed  Taken 12/12/2023 0200 by Lexie Zamarripa RN  Medication Review/Management: medications reviewed  Taken 12/12/2023 0025 by Lexie Zamarripa RN  Medication Review/Management: medications reviewed  Taken 12/11/2023 2200 by Lexie Zamarripa RN  Medication Review/Management: medications reviewed  Taken 12/11/2023 2005 by Lexie Zamarripa RN  Medication Review/Management: medications reviewed  Intervention: Promote Injury-Free Environment  Recent Flowsheet Documentation  Taken 12/12/2023 0440 by Lexie Zamarripa RN  Safety Promotion/Fall Prevention:   assistive device/personal items within reach   clutter free environment maintained   fall prevention program maintained   lighting adjusted   mobility aid in reach   nonskid shoes/slippers when out of bed   room organization consistent   safety round/check completed  Taken 12/12/2023 0200 by Lexie Zamarripa RN  Safety Promotion/Fall Prevention:   assistive device/personal items within reach   clutter free environment maintained   fall prevention program maintained   lighting adjusted   mobility aid in reach   nonskid shoes/slippers when out of bed   room organization consistent   safety round/check completed  Taken 12/12/2023 0025 by Lexie Zamarripa RN  Safety Promotion/Fall Prevention:   assistive device/personal items within reach   clutter free environment maintained   fall prevention program maintained   mobility aid in reach   lighting adjusted   nonskid shoes/slippers when out of bed   safety round/check completed   room organization consistent  Taken 12/11/2023 2200 by Lexie Zamarripa RN  Safety Promotion/Fall Prevention:   assistive device/personal items within reach   clutter free environment maintained   fall prevention  program maintained   lighting adjusted   mobility aid in reach   nonskid shoes/slippers when out of bed   room organization consistent   safety round/check completed  Taken 12/11/2023 2005 by Lexie Zamarripa RN  Safety Promotion/Fall Prevention:   assistive device/personal items within reach   clutter free environment maintained   fall prevention program maintained   mobility aid in reach   lighting adjusted   nonskid shoes/slippers when out of bed   room organization consistent   safety round/check completed     Problem: Skin Injury Risk Increased  Goal: Skin Health and Integrity  Outcome: Ongoing, Progressing  Intervention: Optimize Skin Protection  Recent Flowsheet Documentation  Taken 12/12/2023 0440 by Lexie Zamarripa, AP[place]IA  Pressure Reduction Techniques: weight shift assistance provided  Head of Bed (HOB) Positioning: HOB elevated  Pressure Reduction Devices: pressure-redistributing mattress utilized  Skin Protection:   adhesive use limited   tubing/devices free from skin contact   transparent dressing maintained  Taken 12/12/2023 0025 by Lexie Zamarripa RN  Pressure Reduction Techniques:   frequent weight shift encouraged   weight shift assistance provided  Head of Bed (HOB) Positioning: HOB elevated  Pressure Reduction Devices: pressure-redistributing mattress utilized  Skin Protection:   adhesive use limited   tubing/devices free from skin contact   transparent dressing maintained  Taken 12/11/2023 2200 by Lexie Zamarripa RN  Head of Bed (HOB) Positioning: HOB elevated  Taken 12/11/2023 2005 by Lexie Zamarripa RN  Pressure Reduction Techniques:   frequent weight shift encouraged   weight shift assistance provided   pressure points protected  Head of Bed (HOB) Positioning: HOB elevated  Pressure Reduction Devices:   pressure-redistributing mattress utilized   positioning supports utilized  Skin Protection:   adhesive use limited   tubing/devices free from skin contact   transparent dressing maintained     Problem: Restraint,  Nonviolent  Goal: Absence of Harm or Injury  Outcome: Ongoing, Progressing  Intervention: Implement Least Restrictive Safety Strategies  Recent Flowsheet Documentation  Taken 12/12/2023 0440 by Lexie Zamarripa RN  Medical Device Protection: tubing secured  Taken 12/12/2023 0400 by Lexie Zamarripa RN  Medical Device Protection: tubing secured  Less Restrictive Alternative:   bed alarm in use   emotional support provided  De-Escalation Techniques:   appropriate behavior reinforced   increased round frequency   stimulation decreased  Diversional Activities: television  Taken 12/12/2023 0200 by Lexie Zamarripa RN  Medical Device Protection: tubing secured  Less Restrictive Alternative:   bed alarm in use   emotional support provided  De-Escalation Techniques:   appropriate behavior reinforced   increased round frequency   stimulation decreased  Diversional Activities: television  Taken 12/12/2023 0025 by Lexie Zamarripa RN  Medical Device Protection: tubing secured  Taken 12/12/2023 0000 by Lexie Zamarripa RN  Medical Device Protection: tubing secured  Less Restrictive Alternative:   bed alarm in use   emotional support provided  De-Escalation Techniques:   appropriate behavior reinforced   increased round frequency   stimulation decreased  Diversional Activities: television  Taken 12/11/2023 2200 by Lexie Zamarripa RN  Medical Device Protection: tubing secured  Less Restrictive Alternative:   bed alarm in use   emotional support provided  De-Escalation Techniques:   appropriate behavior reinforced   increased round frequency   stimulation decreased  Diversional Activities: television  Taken 12/11/2023 2005 by Lexie Zamarripa RN  Medical Device Protection: tubing secured  Diversional Activities: television  Taken 12/11/2023 2000 by Lexie Zamarripa RN  Medical Device Protection: tubing secured  Less Restrictive Alternative:   bed alarm in use   emotional support provided  De-Escalation Techniques:   stimulation decreased   increased round frequency    appropriate behavior reinforced  Diversional Activities: television  Intervention: Protect Skin and Joint Integrity  Recent Flowsheet Documentation  Taken 12/12/2023 0440 by Lexie Zamarripa RN  Body Position: weight shifting  Range of Motion: active ROM (range of motion) encouraged  Taken 12/12/2023 0025 by Lexie Zamarripa RN  Body Position:   weight shifting   turned   left  Range of Motion: active ROM (range of motion) encouraged  Taken 12/11/2023 2200 by Lexie Zamarripa RN  Body Position: weight shifting  Taken 12/11/2023 2005 by Lexie Zamarripa RN  Body Position: weight shifting  Range of Motion:   ROM (range of motion) performed   active ROM (range of motion) encouraged     Problem: Behavior Regulation Impairment (Dementia Signs/Symptoms)  Goal: Improved Behavioral Control (Dementia Signs/Symptoms)  Outcome: Ongoing, Progressing     Problem: Cognitive Impairment (Dementia Signs/Symptoms)  Goal: Optimized Cognitive Function (Dementia Signs/Symptoms)  Outcome: Ongoing, Progressing     Problem: Mood Impairment (Dementia Signs/Symptoms)  Goal: Improved Mood Symptoms (Dementia Signs/Symptoms)  Outcome: Ongoing, Progressing     Problem: Nutrition Imbalance (Dementia Signs/Symptoms)  Goal: Optimized Nutrition Intake (Dementia Signs/Symptoms)  Outcome: Ongoing, Progressing     Problem: Sleep Disturbance (Dementia Signs/Symptoms)  Goal: Improved Sleep (Dementia Signs/Symptoms)  Outcome: Ongoing, Progressing     Problem: Social or Functional Impairment (Dementia Signs/Symptoms)  Goal: Enhanced Social or Functional Skills and Ability (Dementia Signs/Symptoms)  Outcome: Ongoing, Progressing     Problem: Adult Inpatient Plan of Care  Goal: Plan of Care Review  Outcome: Ongoing, Progressing  Goal: Patient-Specific Goal (Individualized)  Outcome: Ongoing, Progressing  Goal: Absence of Hospital-Acquired Illness or Injury  Outcome: Ongoing, Progressing  Intervention: Identify and Manage Fall Risk  Recent Flowsheet Documentation  Taken  12/12/2023 0440 by Lexie Zamarripa RN  Safety Promotion/Fall Prevention:   assistive device/personal items within reach   clutter free environment maintained   fall prevention program maintained   lighting adjusted   mobility aid in reach   nonskid shoes/slippers when out of bed   room organization consistent   safety round/check completed  Taken 12/12/2023 0200 by Lexie Zamarripa, APOLONIA  Safety Promotion/Fall Prevention:   assistive device/personal items within reach   clutter free environment maintained   fall prevention program maintained   lighting adjusted   mobility aid in reach   nonskid shoes/slippers when out of bed   room organization consistent   safety round/check completed  Taken 12/12/2023 0025 by Lexie Zamarripa, RN  Safety Promotion/Fall Prevention:   assistive device/personal items within reach   clutter free environment maintained   fall prevention program maintained   mobility aid in reach   lighting adjusted   nonskid shoes/slippers when out of bed   safety round/check completed   room organization consistent  Taken 12/11/2023 2200 by Lexie Zamarripa, RN  Safety Promotion/Fall Prevention:   assistive device/personal items within reach   clutter free environment maintained   fall prevention program maintained   lighting adjusted   mobility aid in reach   nonskid shoes/slippers when out of bed   room organization consistent   safety round/check completed  Taken 12/11/2023 2005 by Lexie Zamarripa, RN  Safety Promotion/Fall Prevention:   assistive device/personal items within reach   clutter free environment maintained   fall prevention program maintained   mobility aid in reach   lighting adjusted   nonskid shoes/slippers when out of bed   room organization consistent   safety round/check completed  Intervention: Prevent Skin Injury  Recent Flowsheet Documentation  Taken 12/12/2023 0440 by Lexie Zamarripa, RN  Body Position: weight shifting  Skin Protection:   adhesive use limited   tubing/devices free from skin contact    transparent dressing maintained  Taken 12/12/2023 0025 by Lexie Zamarripa RN  Body Position:   weight shifting   turned   left  Skin Protection:   adhesive use limited   tubing/devices free from skin contact   transparent dressing maintained  Taken 12/11/2023 2200 by Lexie Zamarripa RN  Body Position: weight shifting  Taken 12/11/2023 2005 by Lexie Zamarripa RN  Body Position: weight shifting  Skin Protection:   adhesive use limited   tubing/devices free from skin contact   transparent dressing maintained  Intervention: Prevent and Manage VTE (Venous Thromboembolism) Risk  Recent Flowsheet Documentation  Taken 12/12/2023 0440 by Lexie Zamarripa RN  Range of Motion: active ROM (range of motion) encouraged  Taken 12/12/2023 0025 by Lexie Zamarripa RN  Range of Motion: active ROM (range of motion) encouraged  Taken 12/11/2023 2005 by Lexie Zamarripa RN  Activity Management: bedrest  VTE Prevention/Management:   bilateral   sequential compression devices off   patient refused intervention  Range of Motion:   ROM (range of motion) performed   active ROM (range of motion) encouraged  Intervention: Prevent Infection  Recent Flowsheet Documentation  Taken 12/12/2023 0440 by Lexie Zamarripa RN  Infection Prevention:   equipment surfaces disinfected   hand hygiene promoted   personal protective equipment utilized   rest/sleep promoted   single patient room provided  Taken 12/12/2023 0200 by Lexie Zamarripa RN  Infection Prevention:   equipment surfaces disinfected   hand hygiene promoted   personal protective equipment utilized   rest/sleep promoted   single patient room provided  Taken 12/12/2023 0025 by Lexie Zamarripa RN  Infection Prevention:   equipment surfaces disinfected   personal protective equipment utilized   hand hygiene promoted   rest/sleep promoted   single patient room provided  Taken 12/11/2023 2200 by Lexie Zamarripa RN  Infection Prevention:   equipment surfaces disinfected   hand hygiene promoted   personal protective equipment utilized    rest/sleep promoted   single patient room provided  Taken 12/11/2023 2005 by Lexie Zamarripa, RN  Infection Prevention:   equipment surfaces disinfected   hand hygiene promoted   personal protective equipment utilized   rest/sleep promoted   single patient room provided  Goal: Optimal Comfort and Wellbeing  Outcome: Ongoing, Progressing  Goal: Readiness for Transition of Care  Outcome: Ongoing, Progressing   Goal Outcome Evaluation:

## 2023-12-12 NOTE — THERAPY TREATMENT NOTE
Acute Care - Speech Language Pathology   Swallow Treatment Note  Eugenio     Patient Name: Maddison Vasquez  : 1940  MRN: [id number]  Today's Date: 2023               Admit Date: 2023    Visit Dx:     ICD-10-CM ICD-9-CM   1. Acute renal failure, unspecified acute renal failure type  N17.9 584.9   2. UTI (urinary tract infection), uncomplicated  N39.0 599.0   3. Altered mental status, unspecified altered mental status type  R41.82 780.97   4. Hypernatremia  E87.0 276.0   5. Hyperkalemia  E87.5 276.7     Patient Active Problem List   Diagnosis    Hypertension    Seizures    Dementia    Lung nodule < 6cm on CT    Thoracic spine fracture    Hyponatremia    Anemia    Head contusion    Closed fracture of proximal end of left humerus    Closed Colles' fracture of left radius    Fall    URSULA (acute kidney injury)     Past Medical History:   Diagnosis Date    Dementia     Hearing loss     Hypertension     Normochromic normocytic anemia     Seizures     Stage III chronic kidney disease      Past Surgical History:   Procedure Laterality Date    BACK SURGERY      BRAIN SURGERY      ORIF HUMERUS FRACTURE Left 2022    Procedure: HUMERUS PROXIMAL OPEN REDUCTION INTERNAL FIXATION;  Surgeon: Bulmaro Wallace MD;  Location: [facility] OR;  Service: Orthopedics;  Laterality: Left;    ORIF WRIST FRACTURE Left 2022    Procedure: WRIST OPEN REDUCTION INTERNAL FIXATION;  Surgeon: Bulmaro Wallace MD;  Location: [facility] MAIN OR;  Service: Orthopedics;  Laterality: Left;       SLP Recommendation and Plan  SLP Swallowing Diagnosis: moderate, oral dysphagia, functional pharyngeal phase (23 1200)  SLP Diet Recommendation: puree, thin liquids (23 1200)  Recommended Precautions and Strategies: upright posture during/after eating, small bites of food and sips of liquid, alternate between small bites of food and sips of liquid, check mouth frequently for oral residue/pocketing, general aspiration precautions,  "reflux precautions, fatigue precautions, assist with feeding (12/12/23 1200)  SLP Rec. for Method of Medication Administration: meds crushed, with puree, as tolerated (12/12/23 1200)     Monitor for Signs of Aspiration: yes, notify SLP if any concerns, cough, gurgly voice, throat clearing (12/12/23 1200)     Swallow Criteria for Skilled Therapeutic Interventions Met: demonstrates skilled criteria (12/12/23 1200)  Anticipated Discharge Disposition (SLP): skilled nursing facility (12/12/23 1200)  Rehab Potential/Prognosis, Swallowing: good, to achieve stated therapy goals (12/12/23 1200)  Therapy Frequency (Swallow): PRN (12/12/23 1200)  Predicted Duration Therapy Intervention (Days): until discharge (12/12/23 1200)  Oral Care Recommendations: Oral Care before breakfast, after meals and PRN (12/12/23 1200)        Daily Summary of Progress (SLP): progress toward functional goals as expected (12/12/23 1200)               Treatment Assessment (SLP): continued, toleration of diet, oral dysphagia (12/12/23 1200)     Plan for Continued Treatment (SLP): continue treatment per plan of care (12/12/23 1200)       Oral Care Recommendations: Oral Care before breakfast, after meals and PRN (12/12/23 1200)           SWALLOW EVALUATION (last 72 hours)       SLP Adult Swallow Evaluation       Row Name 12/12/23 1200       Rehab Evaluation    Document Type therapy note (daily note)  -CP    Subjective Information no complaints  -CP    Patient Observations alert;cooperative  -CP    Patient Effort good  -CP    Comment Skilled ST targeting dysphagia was conducted today. Pt was seen for meal assessment. Pt was brought upright in bed and fed by clinician. Pt was seen consuming puree waffle, yogurt, puree bananas, and drinking OJ by straw. The puree waffles were not fully pureed with a \"crust\" around each one. Clinician added syrp and mashed them with a fork, but pt had difficulty clearing oral cavity after each bite. Oral resials remained. A " liquid wash helped clear this. When pt given yougurt and puree bananas, no oral residual remained after each bite. Oral transit was functional for this puree consistency. Digital palpation of swallow suggests timely initiation. After the swallow, pt had no cough or other overt s/s of aspiration. Pt appears to tolerate smooth puree food with timely oral transit and no overt s/s of aspiration. Pt had difficulty clearing oral cavity of the puree food that was not as smooth. Due to this, pt does not appear ready for solids and puree is mnost likely L/R diet. ST will continue to follow to assure toelrance of diet and make further recs as indicated.  -CP       SLP Evaluation Clinical Impression    SLP Swallowing Diagnosis moderate;oral dysphagia;functional pharyngeal phase  -CP    Functional Impact risk of malnutrition;risk of aspiration/pneumonia  -CP    Rehab Potential/Prognosis, Swallowing good, to achieve stated therapy goals  -CP    Swallow Criteria for Skilled Therapeutic Interventions Met demonstrates skilled criteria  -CP       SLP Treatment Clinical Impressions    Treatment Assessment (SLP) continued;toleration of diet;oral dysphagia  -CP    Daily Summary of Progress (SLP) progress toward functional goals as expected  -CP    Barriers to Overall Progress (SLP) Cognitive status  -CP    Plan for Continued Treatment (SLP) continue treatment per plan of care  -CP       Recommendations    Therapy Frequency (Swallow) PRN  -CP    Predicted Duration Therapy Intervention (Days) until discharge  -CP    SLP Diet Recommendation puree;thin liquids  -CP    Recommended Precautions and Strategies upright posture during/after eating;small bites of food and sips of liquid;alternate between small bites of food and sips of liquid;check mouth frequently for oral residue/pocketing;general aspiration precautions;reflux precautions;fatigue precautions;assist with feeding  -CP    Oral Care Recommendations Oral Care before breakfast, after  meals and PRN  -CP    SLP Rec. for Method of Medication Administration meds crushed;with puree;as tolerated  -CP    Monitor for Signs of Aspiration yes;notify SLP if any concerns;cough;gurgly voice;throat clearing  -CP    Anticipated Discharge Disposition (SLP) skilled nursing facility  -CP       Swallow Goals (SLP)    Swallow LTGs Swallow Long Term Goal (free text)  -CP    Swallow STGs diet tolerance goal selection (SLP)  -CP    Diet Tolerance Goal Selection (SLP) Swallow Short Term Goal 1;Swallow Short Term Goal 2  -CP       (LTG) Swallow    (LTG) Swallow Pt will maximize swallow function for least restrictive PO diet, exhibiting no complication associated with dysphagia, adequate PO intake, and demonstrating independent use of swallow compensations  -CP    [place] (Swallow Long Term Goal) with moderate cues (50-74% accuracy)  -CP    Time Frame (Swallow Long Term Goal) by discharge  -CP    Progress/Outcomes (Swallow Long Term Goal) goal ongoing  -CP    Comment (Swallow Long Term Goal) See above  -CP       (STG) Swallow 1    (STG) Swallow 1 The patient will participate in a meal/follow-up assessment to determine safety and adequacy of recommended diet, independent use of safe swallow compensations, pt/family education and additional goals/recommendations to follow  -CP    [place] (Swallow Short Term Goal 1) with maximum cues (25-49% accuracy)  -CP    Time Frame (Swallow Short Term Goal 1) 1 week  -CP    Progress/Outcomes (Swallow Short Term Goal 1) goal ongoing  -CP    Comment (Swallow Short Term Goal 1) See above  -CP       (STG) Swallow 2    (STG) Swallow 2 Pt will participate in ongoing swallow assessment to include VFSS if indicated, and caregiver teaching.  -CP    [place] (Swallow Short Term Goal 2) with maximum cues (25-49% accuracy)  -CP    Time Frame (Swallow Short Term Goal 2) 1 week  -CP    Progress/Outcomes (Swallow Short Term Goal 2) goal ongoing  -CP    Comment (Swallow Short Term Goal  2) See above  -CP              User Key  (r) = Recorded By, (t) = Taken By, (c) = Cosigned By      Initials Name Effective Dates    CP Mihaela Casanova, SLP 06/16/21 -                     EDUCATION  The patient has been educated in the following areas:   Dysphagia (Swallowing Impairment) Oral Care/Hydration Modified Diet Instruction.        SLP GOALS       Row Name 12/12/23 1200 12/11/23 1500          (LTG) Swallow    (LTG) Swallow Pt will maximize swallow function for least restrictive PO diet, exhibiting no complication associated with dysphagia, adequate PO intake, and demonstrating independent use of swallow compensations  -CP Pt will maximize swallow function for least restrictive PO diet, exhibiting no complication associated with dysphagia, adequate PO intake, and demonstrating independent use of swallow compensations  -SM     [place] (Swallow Long Term Goal) with moderate cues (50-74% accuracy)  -CP with moderate cues (50-74% accuracy)  -SM     Time Frame (Swallow Long Term Goal) by discharge  -CP by discharge  -SM     Progress/Outcomes (Swallow Long Term Goal) goal ongoing  -CP goal ongoing  -SM     Comment (Swallow Long Term Goal) See above  -CP See above  -SM        (STG) Swallow 1    (STG) Swallow 1 The patient will participate in a meal/follow-up assessment to determine safety and adequacy of recommended diet, independent use of safe swallow compensations, pt/family education and additional goals/recommendations to follow  -CP The patient will participate in a meal/follow-up assessment to determine safety and adequacy of recommended diet, independent use of safe swallow compensations, pt/family education and additional goals/recommendations to follow  -SM     [place] (Swallow Short Term Goal 1) with maximum cues (25-49% accuracy)  -CP with maximum cues (25-49% accuracy)  -SM     Time Frame (Swallow Short Term Goal 1) 1 week  -CP 1 week  -SM     Progress/Outcomes (Swallow Short Term Goal 1) goal  ongoing  -CP goal ongoing  -SM     Comment (Swallow Short Term Goal 1) See above  -CP See above  -SM        (STG) Swallow 2    (STG) Swallow 2 Pt will participate in ongoing swallow assessment to include VFSS if indicated, and caregiver teaching.  -CP Pt will participate in ongoing swallow assessment to include VFSS if indicated, and caregiver teaching.  -SM     [place] (Swallow Short Term Goal 2) with maximum cues (25-49% accuracy)  -CP with maximum cues (25-49% accuracy)  -SM     Time Frame (Swallow Short Term Goal 2) 1 week  -CP 1 week  -SM     Progress/Outcomes (Swallow Short Term Goal 2) goal ongoing  -CP goal ongoing  -SM     Comment (Swallow Short Term Goal 2) See above  -CP See above  -SM               User Key  (r) = Recorded By, (t) = Taken By, (c) = Cosigned By      Initials Name Provider Type    Mihaela Rand, SLP Speech and Language Pathologist    Ginger Luong, SLP Speech and Language Pathologist                       Time Calculation:                HARDY Patel  12/12/2023

## 2023-12-12 NOTE — PROGRESS NOTES
"RENAL/KCC:     LOS: 5 days    Patient Care Team:  Provider, No Known as PCP - General    Chief Complaint:  URSULA, Hypernatremia    Subjective     Interval History:   Chart reviewed  ROS unobtainable    Objective     Vital Sign Min/Max for last 24 hours  Temp  Min: 97.6 °F (36.4 °C)  Max: 98.5 °F (36.9 °C)   BP  Min: 111/73  Max: 151/67   Pulse  Min: 89  Max: 93   Resp  Min: 13  Max: 27   SpO2  Min: 99 %  Max: 100 %   No data recorded   Weight  Min: 61.4 kg (135 lb 5.8 oz)  Max: 61.4 kg (135 lb 5.8 oz)     Flowsheet Rows      Flowsheet Row First Filed Value   Admission Height 154.9 cm (61\") Documented at 12/07/2023 1204   Admission Weight 65.8 kg (145 lb) Documented at 12/07/2023 1204            No intake/output data recorded.  I/O last 3 completed shifts:  In: 1601.7 [P.O.:600; I.V.:1001.7]  Out: 1400 [Urine:1400]    Physical Exam:  GEN: NAD  ENT: PERRL, EOMI, MMM  NECK: Supple, no JVD  CHEST: CTAB, no W/R/C  CV: RRR, no M/G/R  ABD: Soft, NT, +BS  SKIN: Warm and Dry  NEURO: Lethargic        WBC WBC   Date Value Ref Range Status   12/10/2023 7.70 3.40 - 10.80 10*3/mm3 Final   12/09/2023 9.00 3.40 - 10.80 10*3/mm3 Final      HGB Hemoglobin   Date Value Ref Range Status   12/10/2023 7.9 (L) 12.0 - 15.9 g/dL Final   12/09/2023 8.4 (L) 12.0 - 15.9 g/dL Final      HCT Hematocrit   Date Value Ref Range Status   12/10/2023 25.3 (L) 34.0 - 46.6 % Final   12/09/2023 26.2 (L) 34.0 - 46.6 % Final      Platlets No results found for: \"LABPLAT\"   MCV MCV   Date Value Ref Range Status   12/10/2023 99.7 (H) 79.0 - 97.0 fL Final   12/09/2023 102.0 (H) 79.0 - 97.0 fL Final          Sodium Sodium   Date Value Ref Range Status   12/12/2023 139 136 - 145 mmol/L Final   12/11/2023 140 136 - 145 mmol/L Final   12/10/2023 147 (H) 136 - 145 mmol/L Final   12/09/2023 155 (H) 136 - 145 mmol/L Final      Potassium Potassium   Date Value Ref Range Status   12/12/2023 4.9 3.5 - 5.2 mmol/L Final     Comment:     Slight hemolysis detected by " "analyzer. Result may be falsely elevated.   12/11/2023 4.3 3.5 - 5.2 mmol/L Final   12/10/2023 4.3 3.5 - 5.2 mmol/L Final   12/09/2023 4.5 3.5 - 5.2 mmol/L Final      Chloride Chloride   Date Value Ref Range Status   12/12/2023 112 (H) 98 - 107 mmol/L Final   12/11/2023 109 (H) 98 - 107 mmol/L Final   12/10/2023 114 (H) 98 - 107 mmol/L Final   12/09/2023 122 (H) 98 - 107 mmol/L Final      CO2 CO2   Date Value Ref Range Status   12/12/2023 16.0 (L) 22.0 - 29.0 mmol/L Final   12/11/2023 18.0 (L) 22.0 - 29.0 mmol/L Final   12/10/2023 20.0 (L) 22.0 - 29.0 mmol/L Final   12/09/2023 22.0 22.0 - 29.0 mmol/L Final      BUN BUN   Date Value Ref Range Status   12/12/2023 44 (H) 8 - 23 mg/dL Final   12/11/2023 56 (H) 8 - 23 mg/dL Final   12/10/2023 72 (H) 8 - 23 mg/dL Final   12/09/2023 76 (H) 8 - 23 mg/dL Final      Creatinine Creatinine   Date Value Ref Range Status   12/12/2023 1.48 (H) 0.57 - 1.00 mg/dL Final   12/11/2023 1.40 (H) 0.57 - 1.00 mg/dL Final   12/10/2023 1.57 (H) 0.57 - 1.00 mg/dL Final   12/09/2023 1.82 (H) 0.57 - 1.00 mg/dL Final      Calcium Calcium   Date Value Ref Range Status   12/12/2023 8.4 (L) 8.6 - 10.5 mg/dL Final   12/11/2023 8.5 (L) 8.6 - 10.5 mg/dL Final   12/10/2023 8.9 8.6 - 10.5 mg/dL Final   12/09/2023 9.2 8.6 - 10.5 mg/dL Final      PO4 No results found for: \"CAPO4\"   Albumin No results found for: \"ALBUMIN\"     Magnesium Magnesium   Date Value Ref Range Status   12/10/2023 2.6 (H) 1.6 - 2.4 mg/dL Final   12/09/2023 2.9 (H) 1.6 - 2.4 mg/dL Final      Uric Acid No results found for: \"URICACID\"        Results Review:     I reviewed the patient's new clinical results.    amLODIPine, 10 mg, Oral, Q24H  aspirin, 81 mg, Oral, Daily   Or  aspirin, 300 mg, Rectal, Daily  atenolol, 50 mg, Oral, Q24H  atorvastatin, 80 mg, Oral, Nightly  cefTRIAXone, 1,000 mg, Intravenous, Q24H  enoxaparin, 30 mg, Subcutaneous, Daily  sodium bicarbonate, 1,300 mg, Oral, BID  sodium chloride, 10 mL, Intravenous, " Q12H  traZODone, 50 mg, Oral, Nightly             Medication Review: Reviewed    Assessment & Plan     1) URSULA  2) CKD3 - Baseline Cr mid 1's  3) Severe hypernatremia - from dehydration  4) Hyperkalemia - resolved  5) UTI  6) DM  7) AMS  8) HTN  9) Metabolic acidosis  10) Anemia     PLAN: Cr stable at baseline.  Na normalized.  Encourage PO nutrition.  Add scheduled PO Bicarb.  ABX for UTI per primary.  Palliative following.  Poor prognosis.         Dmitry Lindsey MD  [facility]  12/12/23  10:34 EST

## 2023-12-12 NOTE — THERAPY TREATMENT NOTE
Subjective: Pt agreeable to therapeutic plan of care. Pt in bilateral hand restraints.   Cognition: oriented to Person. Pt alert & stated her name & month, day of birth. Pt performing spontaneous activity such as reaching for cup, spoon, but command following limited to less than 25% consistency.     Objective:     Bed Mobility: Max-A and Assist x 2   Functional Transfers: N/A or Not attempted.     Balance: Sitting balance on EOB: mod-max A.   Functional Ambulation: N/A or Not attempted.    Feeding: Mod-A with v.c. & tactile cuing to initiate.   ADL Position: sitting up in bed and supine  ADL Comments: Pt able to state if she wanted more to eat/drink when asked.     Therapeutic Exercise - 10 Reps L Upper Extremity PROM lying supine. LUE still flaccid. Pt reacting to light touch on LUE.     Vitals: WNL    Pain: Pt expressing generalized discomfort  during transition sup<>sit VAS  Location: generalized  Interventions for pain: Repositioned, Increased Activity, and Therapeutic Presence  Education: Provided education on the importance of mobility in the acute care setting, Verbal/Tactile Cues, ADL training, and Transfer Training      Assessment: Maddison Vasquez presents with ADL impairments affecting function including balance, cognition, coordination, endurance / activity tolerance, grasp, muscle tone abnormal, pain, postural / trunk control, range of motion (ROM), and strength.  Pt alert this day & tolerating & participating with EOB sitting & feeding tasks. Spoke with nursing after tx & since pt not agitated, bilateral wrist restraints were left off at this time per nursing approval. Demonstrated functioning below baseline abilities indicate the need for continued skilled intervention while inpatient. Tolerating session today without incident. Will continue to follow and progress as tolerated.     Plan/Recommendations:   Moderate Intensity Therapy recommended post-acute care. This is recommended as therapy feels  "the patient would require 3-4 days per week and wouldn't tolerate \"3 hour daily\" rehab intensity. SNF would be the preferred choice. If the patient does not agree to SNF, arrange HH or OP depending on home bound status. If patient is medically complex, consider LTACH.. Pt requires no DME at discharge.     Pt desires Skilled Rehab placement at discharge. Pt cooperative; agreeable to therapeutic recommendations and plan of care.     Modified [place]: 5 = Severe disability (Requires constant nursing care and attention, bedridden, incontinent)    Post-Tx Position: Supine with HOB Elevated, Alarms activated, and Call light and personal items within reach  PPE: gloves and gown   "
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
## 2023-12-13 LAB
ANION GAP SERPL CALCULATED.3IONS-SCNC: 11 MMOL/L (ref 5–15)
BACTERIA ISLT: NORMAL
BUN SERPL-MCNC: 39 MG/DL (ref 8–23)
BUN/CREAT SERPL: 31.5 (ref 7–25)
CALCIUM SPEC-SCNC: 8.8 MG/DL (ref 8.6–10.5)
CHLORIDE SERPL-SCNC: 112 MMOL/L (ref 98–107)
CO2 SERPL-SCNC: 21 MMOL/L (ref 22–29)
CREAT SERPL-MCNC: 1.24 MG/DL (ref 0.57–1)
EGFRCR SERPLBLD CKD-EPI 2021: 43.3 ML/MIN/1.73
GLUCOSE BLDC GLUCOMTR-MCNC: 104 MG/DL (ref 70–105)
GLUCOSE BLDC GLUCOMTR-MCNC: 106 MG/DL (ref 70–105)
GLUCOSE BLDC GLUCOMTR-MCNC: 98 MG/DL (ref 70–105)
GLUCOSE SERPL-MCNC: 106 MG/DL (ref 65–99)
POTASSIUM SERPL-SCNC: 4.2 MMOL/L (ref 3.5–5.2)
SODIUM SERPL-SCNC: 144 MMOL/L (ref 136–145)
WHOLE BLOOD HOLD SPECIMEN: NORMAL

## 2023-12-13 PROCEDURE — 97530 THERAPEUTIC ACTIVITIES: CPT

## 2023-12-13 PROCEDURE — 80048 BASIC METABOLIC PNL TOTAL CA: CPT | Performed by: INTERNAL MEDICINE

## 2023-12-13 PROCEDURE — 97110 THERAPEUTIC EXERCISES: CPT

## 2023-12-13 PROCEDURE — 25010000002 CEFTRIAXONE PER 250 MG: Performed by: INTERNAL MEDICINE

## 2023-12-13 PROCEDURE — 82948 REAGENT STRIP/BLOOD GLUCOSE: CPT

## 2023-12-13 PROCEDURE — 92526 ORAL FUNCTION THERAPY: CPT

## 2023-12-13 PROCEDURE — 97535 SELF CARE MNGMENT TRAINING: CPT

## 2023-12-13 RX ADMIN — TRAZODONE HYDROCHLORIDE 50 MG: 50 TABLET ORAL at 20:08

## 2023-12-13 RX ADMIN — Medication 10 ML: at 21:00

## 2023-12-13 RX ADMIN — CEFTRIAXONE 1000 MG: 1 INJECTION, POWDER, FOR SOLUTION INTRAMUSCULAR; INTRAVENOUS at 12:29

## 2023-12-13 RX ADMIN — AMLODIPINE BESYLATE 10 MG: 5 TABLET ORAL at 09:10

## 2023-12-13 RX ADMIN — ATORVASTATIN CALCIUM 80 MG: 40 TABLET, FILM COATED ORAL at 20:08

## 2023-12-13 RX ADMIN — Medication 10 ML: at 09:11

## 2023-12-13 RX ADMIN — ASPIRIN 81 MG CHEWABLE TABLET 81 MG: 81 TABLET CHEWABLE at 09:10

## 2023-12-13 RX ADMIN — ATENOLOL 50 MG: 50 TABLET ORAL at 09:10

## 2023-12-13 RX ADMIN — SODIUM BICARBONATE 1300 MG: 650 TABLET ORAL at 09:10

## 2023-12-13 NOTE — PLAN OF CARE
Problem: Fall Injury Risk  Goal: Absence of Fall and Fall-Related Injury  Outcome: Ongoing, Progressing  Intervention: Identify and Manage Contributors  Recent Flowsheet Documentation  Taken 12/12/2023 2000 by Kana Vaughn LPN  Medication Review/Management: medications reviewed  Intervention: Promote Injury-Free Environment  Recent Flowsheet Documentation  Taken 12/12/2023 2000 by Kana Vaughn LPN  Safety Promotion/Fall Prevention:   activity supervised   assistive device/personal items within reach   clutter free environment maintained   elopement precautions   fall prevention program maintained   lighting adjusted   room organization consistent   safety round/check completed     Problem: Skin Injury Risk Increased  Goal: Skin Health and Integrity  Outcome: Ongoing, Progressing  Intervention: Optimize Skin Protection  Recent Flowsheet Documentation  Taken 12/12/2023 2000 by Kana Vaughn LPN  Pressure Reduction Techniques:   frequent weight shift encouraged   weight shift assistance provided  Head of Bed (HOB) Positioning: HOB elevated  Pressure Reduction Devices:   pressure-redistributing mattress utilized   positioning supports utilized  Skin Protection:   adhesive use limited   tubing/devices free from skin contact     Problem: Restraint, Nonviolent  Goal: Absence of Harm or Injury  Outcome: Ongoing, Progressing  Intervention: Implement Least Restrictive Safety Strategies  Recent Flowsheet Documentation  Taken 12/12/2023 2000 by Kana Vaughn LPN  Medical Device Protection:   tubing secured   IV pole/bag removed from visual field  Diversional Activities: television  Intervention: Protect Dignity, Rights, and Personal Wellbeing  Recent Flowsheet Documentation  Taken 12/12/2023 2000 by Kana Vaughn LPN  Trust Relationship/Rapport:   care explained   choices provided   thoughts/feelings acknowledged  Intervention: Protect Skin and Joint Integrity  Recent Flowsheet Documentation  Taken 12/12/2023  2000 by Kana Vaughn LPN  Body Position: supine     Problem: Behavior Regulation Impairment (Dementia Signs/Symptoms)  Goal: Improved Behavioral Control (Dementia Signs/Symptoms)  Outcome: Ongoing, Progressing     Problem: Cognitive Impairment (Dementia Signs/Symptoms)  Goal: Optimized Cognitive Function (Dementia Signs/Symptoms)  Outcome: Ongoing, Progressing     Problem: Mood Impairment (Dementia Signs/Symptoms)  Goal: Improved Mood Symptoms (Dementia Signs/Symptoms)  Outcome: Ongoing, Progressing     Problem: Nutrition Imbalance (Dementia Signs/Symptoms)  Goal: Optimized Nutrition Intake (Dementia Signs/Symptoms)  Outcome: Ongoing, Progressing     Problem: Sleep Disturbance (Dementia Signs/Symptoms)  Goal: Improved Sleep (Dementia Signs/Symptoms)  Outcome: Ongoing, Progressing     Problem: Social or Functional Impairment (Dementia Signs/Symptoms)  Goal: Enhanced Social or Functional Skills and Ability (Dementia Signs/Symptoms)  Outcome: Ongoing, Progressing  Intervention: Promote Social and Functional Ability  Recent Flowsheet Documentation  Taken 12/12/2023 2000 by Kana Vaughn LPN  Trust Relationship/Rapport:   care explained   choices provided   thoughts/feelings acknowledged     Problem: Adult Inpatient Plan of Care  Goal: Plan of Care Review  Outcome: Ongoing, Progressing  Goal: Patient-Specific Goal (Individualized)  Outcome: Ongoing, Progressing  Goal: Absence of Hospital-Acquired Illness or Injury  Outcome: Ongoing, Progressing  Intervention: Identify and Manage Fall Risk  Recent Flowsheet Documentation  Taken 12/12/2023 2000 by Kana Vaughn LPN  Safety Promotion/Fall Prevention:   activity supervised   assistive device/personal items within reach   clutter free environment maintained   elopement precautions   fall prevention program maintained   lighting adjusted   room organization consistent   safety round/check completed  Intervention: Prevent Skin Injury  Recent Flowsheet  Documentation  Taken 12/12/2023 2000 by Kana Vaughn LPN  Body Position: supine  Skin Protection:   adhesive use limited   tubing/devices free from skin contact  Intervention: Prevent and Manage VTE (Venous Thromboembolism) Risk  Recent Flowsheet Documentation  Taken 12/12/2023 2000 by Kana Vaughn LPN  VTE Prevention/Management:   bilateral   sequential compression devices off  Intervention: Prevent Infection  Recent Flowsheet Documentation  Taken 12/12/2023 2000 by Kana Vaughn LPN  Infection Prevention:   environmental surveillance performed   hand hygiene promoted   equipment surfaces disinfected   personal protective equipment utilized   rest/sleep promoted   single patient room provided  Goal: Optimal Comfort and Wellbeing  Outcome: Ongoing, Progressing  Intervention: Provide Person-Centered Care  Recent Flowsheet Documentation  Taken 12/12/2023 2000 by Kana Vaughn LPN  Trust Relationship/Rapport:   care explained   choices provided   thoughts/feelings acknowledged  Goal: Readiness for Transition of Care  Outcome: Ongoing, Progressing   Goal Outcome Evaluation:

## 2023-12-13 NOTE — THERAPY TREATMENT NOTE
Subjective: Pt agreeable to therapeutic plan of care.  Cognition: oriented to Person, disoriented to Place, Time, and Situation, memory: Impaired long term and Impaired short term, arousal/alertness: Alert, Attentive, and Confused, safety/judgement: poor, and awareness of deficits: poor awareness of safety precaution and poor awareness of defits    Objective: attentive grndtr at bedside to receive coaching for PROM and already aware of positioning for LUE edema.    Bed Mobility: Assist x 2 and Dependent   Functional Transfers: Max-A come to stand at EOB. Pt bore weight on her rle and stood at bedside w/ max (A), bringing hips fully off the bed; however because of her short stature it was not safe to try lifting her backside over the high chair arm for a pivot. She would benefit form a drop-arm chair.     Balance: sitting EOB and supported Min-A and Mod-A. Pt pushes to the right at times but was able to achieve midline for several minutes on the bed by holding granddaughter's hand across her lap.  Functional Ambulation: N/A or Not attempted.    Upper Body Dressing, Lower Body Dressing, Toileting, Grooming, Feeding, and Bathing: Dependent  ADL Position: edge of bed sitting and supine  ADL Comments: at EOB pt perks up and asks for food and drink.     Pain: 4 VAS  Location: LUE, LLE  Interventions for pain: Repositioned, Increased Activity, and Therapeutic Presence  Education: Provided education on the importance of mobility in the acute care setting, Verbal/Tactile Cues, ADL training, Transfer Training, and WB'ing status      Assessment: Maddison Vasquez presents with ADL impairments affecting function including balance, cognition, coordination, endurance / activity tolerance, grasp, motor control, muscle tone abnormal, pain, postural / trunk control, range of motion (ROM), and strength. Demonstrated functioning below baseline abilities indicate the need for continued skilled intervention while inpatient. Tolerating  "session today without incident. Pt verbalizes the desire to sit up and eagerly participated in standing at EOB, in sitting balance activities, ROM exercises, and eating/drinking activities. She plans [facility] care upon return to her facility. Recommend she be offered therapy services while there to progress her mobility and enable participation in valued life functions. Will continue to follow and progress as tolerated.     Plan/Recommendations:   Moderate Intensity Therapy recommended post-acute care. This is recommended as therapy feels the patient would require 3-4 days per week and wouldn't tolerate \"3 hour daily\" rehab intensity. SNF would be the preferred choice. If the patient does not agree to SNF, arrange HH or OP depending on home bound status. If patient is medically complex, consider LTACH.. Pt requires no DME at discharge.     Pt desires ECF placement at discharge. Pt cooperative; agreeable to therapeutic recommendations and plan of care.     Modified [place]: 5 = Severe disability (Requires constant nursing care and attention, bedridden, incontinent)    Post-Tx Position: Supine with HOB Elevated, Staff Present, and Call light and personal items within reach  PPE: gloves and gown    "

## 2023-12-13 NOTE — PLAN OF CARE
"Goal Outcome Evaluation:  Plan of Care Reviewed With: patient     Assessment: Maddison Vasquez presents with ADL impairments affecting function including balance, cognition, coordination, endurance / activity tolerance, grasp, motor control, muscle tone abnormal, pain, postural / trunk control, range of motion (ROM), and strength. Demonstrated functioning below baseline abilities indicate the need for continued skilled intervention while inpatient. Tolerating session today without incident. Pt verbalizes the desire to sit up and eagerly participated in standing at EOB, in sitting balance activities, ROM exercises, and eating/drinking activities. She plans [facility] care upon return to her facility. Recommend she be offered therapy services while there to progress her mobility and enable participation in valued life functions. Will continue to follow and progress as tolerated.      Plan/Recommendations:   Moderate Intensity Therapy recommended post-acute care. This is recommended as therapy feels the patient would require 3-4 days per week and wouldn't tolerate \"3 hour daily\" rehab intensity. SNF would be the preferred choice. If the patient does not agree to SNF, arrange HH or OP depending on home bound status. If patient is medically complex, consider LTACH.. Pt requires no DME at discharge.      Pt desires ECF placement at discharge. Pt cooperative; agreeable to therapeutic recommendations and plan of care.       Anticipated Discharge Disposition (OT): skilled nursing facility  "

## 2023-12-13 NOTE — THERAPY DISCHARGE NOTE
Acute Care - Speech Language Pathology   Swallow Treatment Note/Discharge LENKA Vaughnyd     Patient Name: Maddison Vasquez  : 1940  MRN: [id number]  Today's Date: 2023               Admit Date: 2023    Visit Dx:    ICD-10-CM ICD-9-CM   1. Acute renal failure, unspecified acute renal failure type  N17.9 584.9   2. UTI (urinary tract infection), uncomplicated  N39.0 599.0   3. Altered mental status, unspecified altered mental status type  R41.82 780.97   4. Hypernatremia  E87.0 276.0   5. Hyperkalemia  E87.5 276.7     Patient Active Problem List   Diagnosis    Hypertension    Seizures    Dementia    Lung nodule < 6cm on CT    Thoracic spine fracture    Hyponatremia    Anemia    Head contusion    Closed fracture of proximal end of left humerus    Closed Colles' fracture of left radius    Fall    URSULA (acute kidney injury)     Past Medical History:   Diagnosis Date    Dementia     Hearing loss     Hypertension     Normochromic normocytic anemia     Seizures     Stage III chronic kidney disease      Past Surgical History:   Procedure Laterality Date    BACK SURGERY      BRAIN SURGERY      ORIF HUMERUS FRACTURE Left 2022    Procedure: HUMERUS PROXIMAL OPEN REDUCTION INTERNAL FIXATION;  Surgeon: Bulmaro Wallace MD;  Location: [facility] MAIN OR;  Service: Orthopedics;  Laterality: Left;    ORIF WRIST FRACTURE Left 2022    Procedure: WRIST OPEN REDUCTION INTERNAL FIXATION;  Surgeon: Bulmaro Wallace MD;  Location: [facility] MAIN OR;  Service: Orthopedics;  Laterality: Left;       SLP Recommendation and Plan  SLP Swallowing Diagnosis: moderate, oral dysphagia, functional pharyngeal phase (23 1259)  SLP Diet Recommendation: puree, thin liquids (23 1259)     Monitor for Signs of Aspiration: yes, notify SLP if any concerns, cough, gurgly voice, throat clearing (23 1259)     Swallow Criteria for Skilled Therapeutic Interventions Met: baseline status, no problems identified which require skilled  "intervention (12/13/23 1259)  Anticipated Discharge Disposition (SLP): skilled nursing facility (12/13/23 1259)  Rehab Potential/Prognosis, Swallowing: good, to achieve stated therapy goals (12/13/23 1259)                 Anticipated Discharge Disposition (SLP): skilled nursing facility (12/13/23 1259)                 Treatment Assessment (SLP): continued, toleration of diet, no clinical signs of, aspiration (12/13/23 1259)               SWALLOW EVALUATION (last 72 hours)       SLP Adult Swallow Evaluation       Row Name 12/13/23 1259       Rehab Evaluation    Document Type discharge treatment  -EC    Subjective Information no complaints  -EC    Patient Observations alert;cooperative;agree to therapy  -EC    Patient Effort good  -EC    Comment Assessment completed to ensure continued diet tolerance. Pt is currently on a puree diet w/thin liquids. Pt asleep upon entry though awakens easily and is amenable to repositioning to be sitting upright w/supports in place to keep pt midline and she tends to lean R. Pt is fed by clinician and consumes puree and thin lqiuid items from lunch tray. Oral transit is WNL for puree and thin liquids. Pt adequately clears oral cavity between bites. No overt clinical s/s of aspiration demonstrated at anytime. Recommend continue current diet. Puree/thin is likely pt's least restrictive diet due to edentulousness, inability to clear oral cavity yesterday w/slightly \"crusted\" over puree and pt positioning. ST to sign off as no further skilled dysphagia tx appears indicated. Please re-consult if needs arise. Thank you.  -EC    Symptoms Noted During/After Treatment none  -EC       General Information    Patient Profile Reviewed yes  -EC       SLP Evaluation Clinical Impression    SLP Swallowing Diagnosis moderate;oral dysphagia;functional pharyngeal phase  -EC    Functional Impact risk of malnutrition;risk of aspiration/pneumonia  -EC    Rehab Potential/Prognosis, Swallowing good, to achieve " stated therapy goals  -EC    Swallow Criteria for Skilled Therapeutic Interventions Met baseline status;no problems identified which require skilled intervention  -EC       SLP Treatment Clinical Impressions    Treatment Assessment (SLP) continued;toleration of diet;no clinical signs of;aspiration  -EC    Daily Summary of Progress (SLP) --    Barriers to Overall Progress (SLP) --    Plan for Continued Treatment (SLP) --    Care Plan Review evaluation/treatment results reviewed  -EC       Recommendations    Therapy Frequency (Swallow) --    Predicted Duration Therapy Intervention (Days) --    SLP Diet Recommendation puree;thin liquids  -EC    Recommended Precautions and Strategies upright posture during/after eating;small bites of food and sips of liquid;alternate between small bites of food and sips of liquid;check mouth frequently for oral residue/pocketing;general aspiration precautions;reflux precautions;fatigue precautions;assist with feeding  -EC    Oral Care Recommendations Oral Care before breakfast, after meals and PRN  -EC    SLP Rec. for Method of Medication Administration meds crushed;with puree;as tolerated  -EC    Monitor for Signs of Aspiration yes;notify SLP if any concerns;cough;gurgly voice;throat clearing  -EC    Anticipated Discharge Disposition (SLP) skilled nursing facility  -EC       Swallow Goals (SLP)    Swallow LTGs Swallow Long Term Goal (free text)  -EC    Swallow STGs diet tolerance goal selection (SLP)  -EC    Diet Tolerance Goal Selection (SLP) Swallow Short Term Goal 1;Swallow Short Term Goal 2  -EC       (LTG) Swallow    (LTG) Swallow Pt will maximize swallow function for least restrictive PO diet, exhibiting no complication associated with dysphagia, adequate PO intake, and demonstrating independent use of swallow compensations  -EC    [place] (Swallow Long Term Goal) with moderate cues (50-74% accuracy)  -EC    Time Frame (Swallow Long Term Goal) by discharge  -EC     Progress/Outcomes (Swallow Long Term Goal) goal met  -EC    Comment (Swallow Long Term Goal) --       (STG) Swallow 1    (STG) Swallow 1 The patient will participate in a meal/follow-up assessment to determine safety and adequacy of recommended diet, independent use of safe swallow compensations, pt/family education and additional goals/recommendations to follow  -EC    [place] (Swallow Short Term Goal 1) with maximum cues (25-49% accuracy)  -EC    Time Frame (Swallow Short Term Goal 1) 1 week  -EC    Progress/Outcomes (Swallow Short Term Goal 1) goal met  -EC    Comment (Swallow Short Term Goal 1) --       (STG) Swallow 2    (STG) Swallow 2 Pt will participate in ongoing swallow assessment to include VFSS if indicated, and caregiver teaching.  -EC    [place] (Swallow Short Term Goal 2) with maximum cues (25-49% accuracy)  -EC    Time Frame (Swallow Short Term Goal 2) 1 week  -EC    Progress/Outcomes (Swallow Short Term Goal 2) goal met  -EC    Comment (Swallow Short Term Goal 2) --              User Key  (r) = Recorded By, (t) = Taken By, (c) = Cosigned By      Initials Name Effective Dates    CP Mihaela Casanova SLP 06/16/21 -     EC Xochilt Solitario 06/16/21 -                     EDUCATION  The patient has been educated in the following areas:   Dysphagia (Swallowing Impairment).         SLP GOALS       Row Name 12/13/23 1259 12/12/23 1200 12/11/23 1500       (LTG) Swallow    (LTG) Swallow Pt will maximize swallow function for least restrictive PO diet, exhibiting no complication associated with dysphagia, adequate PO intake, and demonstrating independent use of swallow compensations  -EC Pt will maximize swallow function for least restrictive PO diet, exhibiting no complication associated with dysphagia, adequate PO intake, and demonstrating independent use of swallow compensations  -CP Pt will maximize swallow function for least restrictive PO diet, exhibiting no complication associated with  dysphagia, adequate PO intake, and demonstrating independent use of swallow compensations  -SM    [place] (Swallow Long Term Goal) with moderate cues (50-74% accuracy)  -EC with moderate cues (50-74% accuracy)  -CP with moderate cues (50-74% accuracy)  -SM    Time Frame (Swallow Long Term Goal) by discharge  -EC by discharge  -CP by discharge  -SM    Progress/Outcomes (Swallow Long Term Goal) goal met  -EC goal ongoing  -CP goal ongoing  -SM    Comment (Swallow Long Term Goal) -- See above  -CP See above  -SM       (STG) Swallow 1    (STG) Swallow 1 The patient will participate in a meal/follow-up assessment to determine safety and adequacy of recommended diet, independent use of safe swallow compensations, pt/family education and additional goals/recommendations to follow  -EC The patient will participate in a meal/follow-up assessment to determine safety and adequacy of recommended diet, independent use of safe swallow compensations, pt/family education and additional goals/recommendations to follow  -CP The patient will participate in a meal/follow-up assessment to determine safety and adequacy of recommended diet, independent use of safe swallow compensations, pt/family education and additional goals/recommendations to follow  -SM    [place] (Swallow Short Term Goal 1) with maximum cues (25-49% accuracy)  -EC with maximum cues (25-49% accuracy)  -CP with maximum cues (25-49% accuracy)  -SM    Time Frame (Swallow Short Term Goal 1) 1 week  -EC 1 week  -CP 1 week  -SM    Progress/Outcomes (Swallow Short Term Goal 1) goal met  -EC goal ongoing  -CP goal ongoing  -SM    Comment (Swallow Short Term Goal 1) -- See above  -CP See above  -SM       (STG) Swallow 2    (STG) Swallow 2 Pt will participate in ongoing swallow assessment to include VFSS if indicated, and caregiver teaching.  -EC Pt will participate in ongoing swallow assessment to include VFSS if indicated, and caregiver teaching.  -CP Pt will  participate in ongoing swallow assessment to include VFSS if indicated, and caregiver teaching.  -SM    [place] (Swallow Short Term Goal 2) with maximum cues (25-49% accuracy)  -EC with maximum cues (25-49% accuracy)  -CP with maximum cues (25-49% accuracy)  -SM    Time Frame (Swallow Short Term Goal 2) 1 week  -EC 1 week  -CP 1 week  -SM    Progress/Outcomes (Swallow Short Term Goal 2) goal met  -EC goal ongoing  -CP goal ongoing  -SM    Comment (Swallow Short Term Goal 2) -- See above  -CP See above  -SM              User Key  (r) = Recorded By, (t) = Taken By, (c) = Cosigned By      Initials Name Provider Type    CP Mihaela Casanova, SLP Speech and Language Pathologist    Xochilt Castellanos Speech and Language Pathologist    Ginger Luong, SLP Speech and Language Pathologist                         Time Calculation:                SLP Discharge Summary  Anticipated Discharge Disposition (SLP): skilled nursing facility    Xochilt Solitario  12/13/2023
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
## 2023-12-13 NOTE — PLAN OF CARE
Problem: Fall Injury Risk  Goal: Absence of Fall and Fall-Related Injury  Outcome: Ongoing, Progressing  Intervention: Identify and Manage Contributors  Recent Flowsheet Documentation  Taken 12/13/2023 1100 by Mandy Singh LPN  Medication Review/Management: medications reviewed  Taken 12/13/2023 1000 by Mandy Singh LPN  Medication Review/Management: medications reviewed  Taken 12/13/2023 0830 by Mandy Singh LPN  Medication Review/Management: medications reviewed  Intervention: Promote Injury-Free Environment  Recent Flowsheet Documentation  Taken 12/13/2023 1000 by [place], Mandy A, LPN  Safety Promotion/Fall Prevention:   activity supervised   assistive device/personal items within reach   clutter free environment maintained   fall prevention program maintained   nonskid shoes/slippers when out of bed   room organization consistent   safety round/check completed  Taken 12/13/2023 0830 by [place], Mandy A, LPN  Safety Promotion/Fall Prevention:   activity supervised   assistive device/personal items within reach   clutter free environment maintained   fall prevention program maintained   nonskid shoes/slippers when out of bed   room organization consistent   safety round/check completed     Problem: Skin Injury Risk Increased  Goal: Skin Health and Integrity  Outcome: Ongoing, Progressing  Intervention: Optimize Skin Protection  Recent Flowsheet Documentation  Taken 12/13/2023 1100 by Mandy Singh LPN  Pressure Reduction Techniques:   frequent weight shift encouraged   weight shift assistance provided  Pressure Reduction Devices:   positioning supports utilized   pressure-redistributing mattress utilized  Skin Protection:   adhesive use limited   tubing/devices free from skin contact     Problem: Restraint, Nonviolent  Goal: Absence of Harm or Injury  Outcome: Ongoing, Progressing  Intervention: Implement Least Restrictive Safety Strategies  Recent Flowsheet Documentation  Taken  12/13/2023 1100 by Mandy Singh LPN  Medical Device Protection:   IV pole/bag removed from visual field   tubing secured  Diversional Activities: television  Taken 12/13/2023 1000 by Mandy Singh LPN  Medical Device Protection:   IV pole/bag removed from visual field   tubing secured  Taken 12/13/2023 0830 by Mandy Singh LPN  Medical Device Protection:   IV pole/bag removed from visual field   tubing secured  Intervention: Protect Dignity, Rights, and Personal Wellbeing  Recent Flowsheet Documentation  Taken 12/13/2023 1100 by Mandy Singh LPN  Trust Relationship/Rapport:   care explained   thoughts/feelings acknowledged  Taken 12/13/2023 0830 by Mandy Singh LPN  Trust Relationship/Rapport:   care explained   thoughts/feelings acknowledged     Problem: Behavior Regulation Impairment (Dementia Signs/Symptoms)  Goal: Improved Behavioral Control (Dementia Signs/Symptoms)  Outcome: Ongoing, Progressing     Problem: Cognitive Impairment (Dementia Signs/Symptoms)  Goal: Optimized Cognitive Function (Dementia Signs/Symptoms)  Outcome: Ongoing, Progressing     Problem: Mood Impairment (Dementia Signs/Symptoms)  Goal: Improved Mood Symptoms (Dementia Signs/Symptoms)  Outcome: Ongoing, Progressing     Problem: Nutrition Imbalance (Dementia Signs/Symptoms)  Goal: Optimized Nutrition Intake (Dementia Signs/Symptoms)  Outcome: Ongoing, Progressing     Problem: Sleep Disturbance (Dementia Signs/Symptoms)  Goal: Improved Sleep (Dementia Signs/Symptoms)  Outcome: Ongoing, Progressing     Problem: Social or Functional Impairment (Dementia Signs/Symptoms)  Goal: Enhanced Social or Functional Skills and Ability (Dementia Signs/Symptoms)  Outcome: Ongoing, Progressing  Intervention: Promote Social and Functional Ability  Recent Flowsheet Documentation  Taken 12/13/2023 1100 by Mandy Singh LPN  Trust Relationship/Rapport:   care explained   thoughts/feelings acknowledged  Taken 12/13/2023  0830 by Mandy Singh LPN  Trust Relationship/Rapport:   care explained   thoughts/feelings acknowledged     Problem: Adult Inpatient Plan of Care  Goal: Plan of Care Review  Outcome: Ongoing, Progressing  Goal: Patient-Specific Goal (Individualized)  Outcome: Ongoing, Progressing  Goal: Absence of Hospital-Acquired Illness or Injury  Outcome: Ongoing, Progressing  Intervention: Identify and Manage Fall Risk  Recent Flowsheet Documentation  Taken 12/13/2023 1000 by [place], Mandy A, LPN  Safety Promotion/Fall Prevention:   activity supervised   assistive device/personal items within reach   clutter free environment maintained   fall prevention program maintained   nonskid shoes/slippers when out of bed   room organization consistent   safety round/check completed  Taken 12/13/2023 0830 by [place], Mandy A, LPN  Safety Promotion/Fall Prevention:   activity supervised   assistive device/personal items within reach   clutter free environment maintained   fall prevention program maintained   nonskid shoes/slippers when out of bed   room organization consistent   safety round/check completed  Intervention: Prevent Skin Injury  Recent Flowsheet Documentation  Taken 12/13/2023 1100 by Mandy Singh LPN  Skin Protection:   adhesive use limited   tubing/devices free from skin contact  Intervention: Prevent and Manage VTE (Venous Thromboembolism) Risk  Recent Flowsheet Documentation  Taken 12/13/2023 1100 by Mandy Singh LPN  VTE Prevention/Management:   bilateral   sequential compression devices off  Taken 12/13/2023 0830 by Mandy Singh LPN  VTE Prevention/Management:   bilateral   sequential compression devices off  Intervention: Prevent Infection  Recent Flowsheet Documentation  Taken 12/13/2023 1000 by Mandy Singh LPN  Infection Prevention: hand hygiene promoted  Taken 12/13/2023 0830 by Mandy Singh LPN  Infection Prevention: hand hygiene promoted  Goal: Optimal Comfort and  Wellbeing  Outcome: Ongoing, Progressing  Intervention: Provide Person-Centered Care  Recent Flowsheet Documentation  Taken 12/13/2023 1100 by Mandy Singh LPN  Trust Relationship/Rapport:   care explained   thoughts/feelings acknowledged  Taken 12/13/2023 0830 by Mandy Singh LPN  Trust Relationship/Rapport:   care explained   thoughts/feelings acknowledged  Goal: Readiness for Transition of Care  Outcome: Ongoing, Progressing   Goal Outcome Evaluation:

## 2023-12-13 NOTE — PLAN OF CARE
"Goal Outcome Evaluation:               Assessment completed to ensure continued diet tolerance. Pt is currently on a puree diet w/thin liquids. Pt asleep upon entry though awakens easily and is amenable to repositioning to be sitting upright w/supports in place to keep pt midline and she tends to lean R. Pt is fed by clinician and consumes puree and thin lqiuid items from lunch tray. Oral transit is WNL for puree and thin liquids. Pt adequately clears oral cavity between bites. No overt clinical s/s of aspiration demonstrated at anytime. Recommend continue current diet. Puree/thin is likely pt's least restrictive diet due to edentulousness, inability to clear oral cavity yesterday w/slightly \"crusted\" over puree and pt positioning. ST to sign off as no further skilled dysphagia tx appears indicated. Please re-consult if needs arise. Thank you.       Anticipated Discharge Disposition (SLP): skilled nursing facility  "

## 2023-12-13 NOTE — PROGRESS NOTES
"RENAL/KCC:     LOS: 6 days    Patient Care Team:  Provider, No Known as PCP - General    Chief Complaint:  URSULA, Hypernatremia    Subjective     Interval History:   Chart reviewed  ROS unobtainable    Objective     Vital Sign Min/Max for last 24 hours  Temp  Min: 97.5 °F (36.4 °C)  Max: 98.6 °F (37 °C)   BP  Min: 125/62  Max: 163/75   Pulse  Min: 86  Max: 96   Resp  Min: 15  Max: 20   SpO2  Min: 90 %  Max: 95 %   No data recorded   Weight  Min: 63.7 kg (140 lb 6.9 oz)  Max: 63.7 kg (140 lb 6.9 oz)     Flowsheet Rows      Flowsheet Row First Filed Value   Admission Height 154.9 cm (61\") Documented at 12/07/2023 1204   Admission Weight 65.8 kg (145 lb) Documented at 12/07/2023 1204            No intake/output data recorded.  I/O last 3 completed shifts:  In: 800 [P.O.:800]  Out: 1150 [Urine:1150]    Physical Exam:  GEN: NAD  ENT: PERRL, EOMI, MMM  NECK: Supple, no JVD  CHEST: CTAB, no W/R/C  CV: RRR, no M/G/R  ABD: Soft, NT, +BS  SKIN: Warm and Dry  NEURO: Lethargic        WBC No results found for: \"WBC\"     HGB No results found for: \"HGB\"     HCT No results found for: \"HCT\"     Platlets No results found for: \"LABPLAT\"   MCV No results found for: \"MCV\"         Sodium Sodium   Date Value Ref Range Status   12/13/2023 144 136 - 145 mmol/L Final   12/12/2023 139 136 - 145 mmol/L Final   12/11/2023 140 136 - 145 mmol/L Final      Potassium Potassium   Date Value Ref Range Status   12/13/2023 4.2 3.5 - 5.2 mmol/L Final   12/12/2023 4.9 3.5 - 5.2 mmol/L Final     Comment:     Slight hemolysis detected by analyzer. Result may be falsely elevated.   12/11/2023 4.3 3.5 - 5.2 mmol/L Final      Chloride Chloride   Date Value Ref Range Status   12/13/2023 112 (H) 98 - 107 mmol/L Final   12/12/2023 112 (H) 98 - 107 mmol/L Final   12/11/2023 109 (H) 98 - 107 mmol/L Final      CO2 CO2   Date Value Ref Range Status   12/13/2023 21.0 (L) 22.0 - 29.0 mmol/L Final   12/12/2023 16.0 (L) 22.0 - 29.0 mmol/L Final   12/11/2023 18.0 (L) 22.0 " "- 29.0 mmol/L Final      BUN BUN   Date Value Ref Range Status   12/13/2023 39 (H) 8 - 23 mg/dL Final   12/12/2023 44 (H) 8 - 23 mg/dL Final   12/11/2023 56 (H) 8 - 23 mg/dL Final      Creatinine Creatinine   Date Value Ref Range Status   12/13/2023 1.24 (H) 0.57 - 1.00 mg/dL Final   12/12/2023 1.48 (H) 0.57 - 1.00 mg/dL Final   12/11/2023 1.40 (H) 0.57 - 1.00 mg/dL Final      Calcium Calcium   Date Value Ref Range Status   12/13/2023 8.8 8.6 - 10.5 mg/dL Final   12/12/2023 8.4 (L) 8.6 - 10.5 mg/dL Final   12/11/2023 8.5 (L) 8.6 - 10.5 mg/dL Final      PO4 No results found for: \"CAPO4\"   Albumin No results found for: \"ALBUMIN\"     Magnesium No results found for: \"MG\"     Uric Acid No results found for: \"URICACID\"        Results Review:     I reviewed the patient's new clinical results.    amLODIPine, 10 mg, Oral, Q24H  aspirin, 81 mg, Oral, Daily   Or  aspirin, 300 mg, Rectal, Daily  atenolol, 50 mg, Oral, Q24H  atorvastatin, 80 mg, Oral, Nightly  cefTRIAXone, 1,000 mg, Intravenous, Q24H  enoxaparin, 30 mg, Subcutaneous, Daily  sodium chloride, 10 mL, Intravenous, Q12H  traZODone, 50 mg, Oral, Nightly             Medication Review: Reviewed    Assessment & Plan     1) URSULA  2) CKD3 - Baseline Cr mid 1's  3) Severe hypernatremia - from dehydration  4) Hyperkalemia - resolved  5) UTI  6) DM  7) AMS  8) HTN  9) Metabolic acidosis  10) Anemia     PLAN: Cr stable at or better than baseline.  Na normalized.  Encourage PO nutrition.  Monitor off PO Bicarb.  ABX for UTI per primary.  Palliative following - noted plans for [facility] eval once returns to nursing facility.  Poor longterm prognosis.         Dmitry Lindsey MD  [facility]  12/13/23  13:28 EST  "

## 2023-12-14 VITALS
BODY MASS INDEX: 26.39 KG/M2 | TEMPERATURE: 98.6 F | OXYGEN SATURATION: 94 % | SYSTOLIC BLOOD PRESSURE: 127 MMHG | HEIGHT: 61 IN | HEART RATE: 93 BPM | RESPIRATION RATE: 27 BRPM | WEIGHT: 139.77 LBS | DIASTOLIC BLOOD PRESSURE: 62 MMHG

## 2023-12-14 LAB
ANION GAP SERPL CALCULATED.3IONS-SCNC: 9 MMOL/L (ref 5–15)
BUN SERPL-MCNC: 40 MG/DL (ref 8–23)
BUN/CREAT SERPL: 27.2 (ref 7–25)
CALCIUM SPEC-SCNC: 8.9 MG/DL (ref 8.6–10.5)
CHLORIDE SERPL-SCNC: 110 MMOL/L (ref 98–107)
CO2 SERPL-SCNC: 19 MMOL/L (ref 22–29)
CREAT SERPL-MCNC: 1.47 MG/DL (ref 0.57–1)
EGFRCR SERPLBLD CKD-EPI 2021: 35.3 ML/MIN/1.73
GLUCOSE SERPL-MCNC: 185 MG/DL (ref 65–99)
NT-PROBNP SERPL-MCNC: 2770 PG/ML (ref 0–1800)
POTASSIUM SERPL-SCNC: 4.4 MMOL/L (ref 3.5–5.2)
SODIUM SERPL-SCNC: 138 MMOL/L (ref 136–145)

## 2023-12-14 PROCEDURE — 80048 BASIC METABOLIC PNL TOTAL CA: CPT | Performed by: INTERNAL MEDICINE

## 2023-12-14 PROCEDURE — 97535 SELF CARE MNGMENT TRAINING: CPT

## 2023-12-14 PROCEDURE — 97112 NEUROMUSCULAR REEDUCATION: CPT

## 2023-12-14 PROCEDURE — 25010000002 ENOXAPARIN PER 10 MG: Performed by: INTERNAL MEDICINE

## 2023-12-14 PROCEDURE — 25010000002 CEFTRIAXONE PER 250 MG: Performed by: INTERNAL MEDICINE

## 2023-12-14 PROCEDURE — 83880 ASSAY OF NATRIURETIC PEPTIDE: CPT | Performed by: INTERNAL MEDICINE

## 2023-12-14 PROCEDURE — 99232 SBSQ HOSP IP/OBS MODERATE 35: CPT

## 2023-12-14 PROCEDURE — 97530 THERAPEUTIC ACTIVITIES: CPT

## 2023-12-14 RX ADMIN — CEFTRIAXONE 1000 MG: 1 INJECTION, POWDER, FOR SOLUTION INTRAMUSCULAR; INTRAVENOUS at 11:44

## 2023-12-14 RX ADMIN — ASPIRIN 81 MG CHEWABLE TABLET 81 MG: 81 TABLET CHEWABLE at 08:59

## 2023-12-14 RX ADMIN — ENOXAPARIN SODIUM 30 MG: 100 INJECTION SUBCUTANEOUS at 17:26

## 2023-12-14 RX ADMIN — Medication 10 ML: at 08:59

## 2023-12-14 NOTE — PLAN OF CARE
Problem: Fall Injury Risk  Goal: Absence of Fall and Fall-Related Injury  Outcome: Met  Intervention: Identify and Manage Contributors  Recent Flowsheet Documentation  Taken 12/14/2023 1200 by Deepa Lux RN  Medication Review/Management: medications reviewed  Taken 12/14/2023 1000 by Deepa Lux RN  Medication Review/Management: medications reviewed  Taken 12/14/2023 0800 by Deepa Lux RN  Medication Review/Management: medications reviewed  Intervention: Promote Injury-Free Environment  Recent Flowsheet Documentation  Taken 12/14/2023 1200 by Deepa Lux RN  Safety Promotion/Fall Prevention:   activity supervised   assistive device/personal items within reach  Taken 12/14/2023 1000 by Deepa Lux RN  Safety Promotion/Fall Prevention:   activity supervised   assistive device/personal items within reach   clutter free environment maintained   fall prevention program maintained   nonskid shoes/slippers when out of bed   room organization consistent   safety round/check completed  Taken 12/14/2023 0800 by Deepa Lux RN  Safety Promotion/Fall Prevention:   activity supervised   assistive device/personal items within reach   clutter free environment maintained   fall prevention program maintained   nonskid shoes/slippers when out of bed   room organization consistent   safety round/check completed     Problem: Skin Injury Risk Increased  Goal: Skin Health and Integrity  Outcome: Met  Intervention: Optimize Skin Protection  Recent Flowsheet Documentation  Taken 12/14/2023 1200 by Deepa Lux RN  Head of Bed ([facility]) Positioning: HOB at 30-45 degrees  Skin Protection:   tubing/devices free from skin contact   incontinence pads utilized  Taken 12/14/2023 1000 by Deepa Lux RN  Head of Bed ([facility]) Positioning: HOB at 30-45 degrees  Taken 12/14/2023 0800 by Deepa Lux RN  Head of Bed ([facility]) Positioning: HOB at 30 degrees  Skin Protection:   incontinence pads utilized   adhesive use  limited   tubing/devices free from skin contact     Problem: Restraint, Nonviolent  Goal: Absence of Harm or Injury  Outcome: Met  Intervention: Implement Least Restrictive Safety Strategies  Recent Flowsheet Documentation  Taken 12/14/2023 1200 by Deepa Lux RN  Medical Device Protection:   tubing secured   IV pole/bag removed from visual field  Diversional Activities: television  Taken 12/14/2023 1000 by Deepa Lux RN  Medical Device Protection:   tubing secured   IV pole/bag removed from visual field  Taken 12/14/2023 0800 by Deepa Lux RN  Medical Device Protection:   tubing secured   IV pole/bag removed from visual field  Diversional Activities: television  Intervention: Protect Dignity, Rights, and Personal Wellbeing  Recent Flowsheet Documentation  Taken 12/14/2023 1200 by Deepa Lux RN  Trust Relationship/Rapport:   care explained   choices provided   emotional support provided   empathic listening provided   questions answered   thoughts/feelings acknowledged  Taken 12/14/2023 0800 by Deepa Lux RN  Trust Relationship/Rapport:   care explained   emotional support provided   choices provided   empathic listening provided   questions answered   thoughts/feelings acknowledged  Intervention: Protect Skin and Joint Integrity  Recent Flowsheet Documentation  Taken 12/14/2023 1200 by Deepa Lux RN  Body Position:   weight shifting   left  Taken 12/14/2023 1000 by Deepa Lux RN  Body Position: sitting up in bed  Taken 12/14/2023 0800 by Deepa Lux RN  Body Position:   turned   right  Range of Motion: ROM (range of motion) performed     Problem: Behavior Regulation Impairment (Dementia Signs/Symptoms)  Goal: Improved Behavioral Control (Dementia Signs/Symptoms)  Outcome: Met     Problem: Cognitive Impairment (Dementia Signs/Symptoms)  Goal: Optimized Cognitive Function (Dementia Signs/Symptoms)  Outcome: Met     Problem: Mood Impairment (Dementia Signs/Symptoms)  Goal:  Improved Mood Symptoms (Dementia Signs/Symptoms)  Outcome: Met     Problem: Nutrition Imbalance (Dementia Signs/Symptoms)  Goal: Optimized Nutrition Intake (Dementia Signs/Symptoms)  Outcome: Met     Problem: Sleep Disturbance (Dementia Signs/Symptoms)  Goal: Improved Sleep (Dementia Signs/Symptoms)  Outcome: Met     Problem: Social or Functional Impairment (Dementia Signs/Symptoms)  Goal: Enhanced Social or Functional Skills and Ability (Dementia Signs/Symptoms)  Outcome: Met  Intervention: Promote Social and Functional Ability  Recent Flowsheet Documentation  Taken 12/14/2023 1200 by Deepa Lux RN  Trust Relationship/Rapport:   care explained   choices provided   emotional support provided   empathic listening provided   questions answered   thoughts/feelings acknowledged  Taken 12/14/2023 0800 by Deepa Lux, RN  Trust Relationship/Rapport:   care explained   emotional support provided   choices provided   empathic listening provided   questions answered   thoughts/feelings acknowledged     Problem: Adult Inpatient Plan of Care  Goal: Plan of Care Review  Outcome: Met  Goal: Patient-Specific Goal (Individualized)  Outcome: Met  Goal: Absence of Hospital-Acquired Illness or Injury  Outcome: Met  Intervention: Identify and Manage Fall Risk  Recent Flowsheet Documentation  Taken 12/14/2023 1200 by Deepa Lux RN  Safety Promotion/Fall Prevention:   activity supervised   assistive device/personal items within reach  Taken 12/14/2023 1000 by Deepa Lux RN  Safety Promotion/Fall Prevention:   activity supervised   assistive device/personal items within reach   clutter free environment maintained   fall prevention program maintained   nonskid shoes/slippers when out of bed   room organization consistent   safety round/check completed  Taken 12/14/2023 0800 by Deepa Lux, RN  Safety Promotion/Fall Prevention:   activity supervised   assistive device/personal items within reach   clutter free  environment maintained   fall prevention program maintained   nonskid shoes/slippers when out of bed   room organization consistent   safety round/check completed  Intervention: Prevent Skin Injury  Recent Flowsheet Documentation  Taken 12/14/2023 1200 by Deepa Lux RN  Body Position:   weight shifting   left  Skin Protection:   tubing/devices free from skin contact   incontinence pads utilized  Taken 12/14/2023 1000 by Deepa Lux RN  Body Position: sitting up in bed  Taken 12/14/2023 0800 by Deepa Lux RN  Body Position:   turned   right  Skin Protection:   incontinence pads utilized   adhesive use limited   tubing/devices free from skin contact  Intervention: Prevent and Manage VTE (Venous Thromboembolism) Risk  Recent Flowsheet Documentation  Taken 12/14/2023 1200 by Deepa Lux RN  Activity Management: activity encouraged  VTE Prevention/Management:   bilateral   sequential compression devices off   patient refused intervention  Taken 12/14/2023 1000 by Deepa Lux RN  Activity Management: activity encouraged  Taken 12/14/2023 0800 by Deepa Lux RN  Activity Management: activity encouraged  VTE Prevention/Management:   bilateral   sequential compression devices off   patient refused intervention  Range of Motion: ROM (range of motion) performed  Intervention: Prevent Infection  Recent Flowsheet Documentation  Taken 12/14/2023 1200 by Deepa Lux RN  Infection Prevention:   environmental surveillance performed   equipment surfaces disinfected   hand hygiene promoted   personal protective equipment utilized   single patient room provided  Taken 12/14/2023 1000 by Deepa Lux RN  Infection Prevention:   environmental surveillance performed   equipment surfaces disinfected   hand hygiene promoted   personal protective equipment utilized   single patient room provided  Taken 12/14/2023 0800 by Deepa Lux RN  Infection Prevention:   environmental surveillance  performed   equipment surfaces disinfected   hand hygiene promoted   personal protective equipment utilized   single patient room provided  Goal: Optimal Comfort and Wellbeing  Outcome: Met  Intervention: Monitor Pain and Promote Comfort  Recent Flowsheet Documentation  Taken 12/14/2023 1200 by Deepa Lux RN  Pain Management Interventions:   care clustered   quiet environment facilitated   position adjusted   pillow support provided  Taken 12/14/2023 0800 by Deepa Lux RN  Pain Management Interventions:   care clustered   position adjusted   pillow support provided   quiet environment facilitated  Intervention: Provide Person-Centered Care  Recent Flowsheet Documentation  Taken 12/14/2023 1200 by Deepa Lux, RN  Trust Relationship/Rapport:   care explained   choices provided   emotional support provided   empathic listening provided   questions answered   thoughts/feelings acknowledged  Taken 12/14/2023 0800 by Deepa Lux RN  Trust Relationship/Rapport:   care explained   emotional support provided   choices provided   empathic listening provided   questions answered   thoughts/feelings acknowledged  Goal: Readiness for Transition of Care  Outcome: Met   Goal Outcome Evaluation:

## 2023-12-14 NOTE — PLAN OF CARE
Fair shift spent, medicated as ordered, left stable, care and observation continues    Problem: Behavior Regulation Impairment (Dementia Signs/Symptoms)  Goal: Improved Behavioral Control (Dementia Signs/Symptoms)  Outcome: Ongoing, Progressing     Problem: Adult Inpatient Plan of Care  Goal: Plan of Care Review  Outcome: Ongoing, Progressing  Goal: Readiness for Transition of Care  Outcome: Ongoing, Progressing   Goal Outcome Evaluation:

## 2023-12-14 NOTE — THERAPY TREATMENT NOTE
Subjective: Pt agreeable to therapeutic plan of care.  Cognition: oriented to Person, disoriented to Place, Time, and Situation, memory: Impaired long term and Impaired short term, arousal/alertness: Listless and Confused, safety/judgement: poor, and awareness of deficits: poor awareness of safety precaution and poor awareness of defits    Objective: makes eye contact, smiles    Bed Mobility: Assist x 2 and Dependent   Functional Transfers: Dependent this date could not stand w/ assist of 2     Balance: sitting EOB and supported Min-A and Mod-A. Have pt cross her mildine with her rt hand to hold hands with someone sitting on her left and her pushing and pulling to her right will decrease for a while.  Functional Ambulation: N/A or Not attempted.    Lower Body Dressing, Toileting, Grooming, Feeding, and Bathing: Dependent  ADL Position: edge of bed sitting and supine  ADL Comments: pt drank most of a boost EOB. It is necessary to remove the straw from her mouth and cue her to swallow.    Pain: 5 VAS  Location: appears improved this date. Less sensitive to touch and pressure though was guarded when OT washed her feet, especially on the left.    Interventions for pain: Repositioned, Increased Activity, and Therapeutic Presence  Education: Provided education on the importance of mobility in the acute care setting, Verbal/Tactile Cues, ADL training, and Transfer Training      Assessment: Maddison Vasquez presents with ADL impairments affecting function including balance, cognition, coordination, endurance / activity tolerance, grasp, motor control, muscle tone abnormal, pain, postural / trunk control, range of motion (ROM), sensation / sensory awareness, and strength. Demonstrated functioning below baseline abilities indicate the need for continued skilled intervention while inpatient. Tolerating session today without incident. Will continue to follow and progress as tolerated.     Plan/Recommendations:   Moderate  "Intensity Therapy recommended post-acute care. This is recommended as therapy feels the patient would require 3-4 days per week and wouldn't tolerate \"3 hour daily\" rehab intensity. SNF would be the preferred choice. If the patient does not agree to SNF, arrange HH or OP depending on home bound status. If patient is medically complex, consider LTACH.. Pt requires no DME at discharge.     Pt desires ECF placement at discharge. Pt cooperative; agreeable to therapeutic recommendations and plan of care.     Modified Angelina: 5 = Severe disability (Requires constant nursing care and attention, bedridden, incontinent)    Post-Tx Position: Supine with HOB Elevated, Alarms activated, and Call light and personal items within reach  PPE: gloves and gown    "

## 2023-12-14 NOTE — PLAN OF CARE
"  Assessment: Maddison Vasquez presents with functional mobility impairments which indicate the need for skilled intervention. Pt continues to be confused and oriented to self only. Pt able to verbalize yes/no. Pt agreeable to sitting EOB. Requires max a for bed mobility. Max VC/TC and assist to come to midline. Pt unable to follow meaningful directions this date. Attempted CTS form EOB with pt unable or unwilling to participate in. Pt returned to supine in bed and all needs met.  Tolerating session today without incident. Will continue to follow and progress as tolerated.     Plan/Recommendations:   If medically appropriate, Moderate Intensity Therapy recommended post-acute care. This is recommended as therapy feels the patient would require 3-4 days per week and wouldn't tolerate \"3 hour daily\" rehab intensity. SNF would be the preferred choice. If the patient does not agree to SNF, arrange HH or OP depending on home bound status. If patient is medically complex, consider LTACH. Pt requires no DME at discharge.     Pt desires Skilled Rehab placement at discharge. Pt cooperative; agreeable to therapeutic recommendations and plan of care.   "

## 2023-12-14 NOTE — PROGRESS NOTES
RENAL/[facility]:    Chart check.  No labs today.  Noted plans for patient to be followed by Hospice at nursing facility once discharged.  Will follow peripherally.  Call with questions.    Dmitry Lindsey M.D.  [facility]
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
## 2023-12-14 NOTE — PLAN OF CARE
"Goal Outcome Evaluation:  Plan of Care Reviewed With: patient    Assessment: Maddison Vasquez presents with ADL impairments affecting function including balance, cognition, coordination, endurance / activity tolerance, grasp, motor control, muscle tone abnormal, pain, postural / trunk control, range of motion (ROM), sensation / sensory awareness, and strength. Demonstrated functioning below baseline abilities indicate the need for continued skilled intervention while inpatient. Tolerating session today without incident. Will continue to follow and progress as tolerated.      Plan/Recommendations:   Moderate Intensity Therapy recommended post-acute care. This is recommended as therapy feels the patient would require 3-4 days per week and wouldn't tolerate \"3 hour daily\" rehab intensity. SNF would be the preferred choice. If the patient does not agree to SNF, arrange HH or OP depending on home bound status. If patient is medically complex, consider LTACH.. Pt requires no DME at discharge.      Pt desires ECF placement at discharge. Pt cooperative; agreeable to therapeutic recommendations and plan of care.       Anticipated Discharge Disposition (OT): skilled nursing facility  "

## 2023-12-14 NOTE — CASE MANAGEMENT/SOCIAL WORK
"Physicians Statement of Medical Necessity for  Ambulance Transportation    GENERAL INFORMATION     Name: Maddison Vasquez  YOB: 1940  Medicare #: [id number]  Transport Date: 12/14/23 (Valid for round trips this date, or for scheduled repetitive trips for 60 days from the date signed below.)  Origin: [facility]  Destination: [place], [place]  Is the Patient's stay covered under Medicare Part A (PPS/DRG?)Yes  Closest appropriate facility? Yes  If this a hosp-hosp transfer? No  Is this a hospice patient? No    MEDICAL NECESSITY QUESTIONAIRE    Ambulance Transportation is medically necessary only if other means of transportation are contraindicated or would be potentially harmful to the patient.  To meet this requirement, the patient must be either \"bed confined\" or suffer from a condition such that transport by means other than an ambulance is contraindicated by the patient's condition.  The following questions must be answered by the healthcare professional signing below for this form to be valid:     1) Describe the MEDICAL CONDITION (physical and/or mental) of this patient AT THE TIME OF AMBULANCE TRANSPORT that requires the patient to be transported in an ambulance, and why transport by other means is contraindicated by the patient's condition:   Past Medical History:   Diagnosis Date    Dementia     Hearing loss     Hypertension     Normochromic normocytic anemia     Seizures     Stage III chronic kidney disease       Past Surgical History:   Procedure Laterality Date    BACK SURGERY      BRAIN SURGERY      ORIF HUMERUS FRACTURE Left 1/28/2022    Procedure: HUMERUS PROXIMAL OPEN REDUCTION INTERNAL FIXATION;  Surgeon: Bulmaro Wallace MD;  Location[facility];  Service: Orthopedics;  Laterality: Left;    ORIF WRIST FRACTURE Left 1/28/2022    Procedure: WRIST OPEN REDUCTION INTERNAL FIXATION;  Surgeon: Bulmaro Wallace MD;  Location: [facility] OR;  Service: Orthopedics;  Laterality: Left;      2) " "Is this patient \"bed confined\" as defined below?Yes   To be \"bed confined\" the patient must satisfy all three of the following criteria:  (1) unable to get up from bed without assistance; AND (2) unable to ambulate;  AND (3) unable to sit in a chair or wheelchair.  3) Can this patient safely be transported by car or wheelchair van (I.e., may safely sit during transport, without an attendant or monitoring?)No   4. In addition to completing questions 1-3 above, please check any of the following conditions that apply*:          *Note: supporting documentation for any boxes checked must be maintained in the patient's medical records Patient is confused, Need or possible need for restraints, and Unable to tolerate seated position for time needed to transport      SIGNATURE OF PHYSICIAN OR OTHER AUTHORIZED HEALTHCARE PROFESSIONAL    I certify that the above information is true and correct based on my evaluation of this patient, and represent that the patient requires transport by ambulance and that other forms of transport are contraindicated.  I understand that this information will be used by the Centers for Medicare and Medicaid Services (CMS) to support the determiniation of medical necessity for ambulance services, and I represent that I have personal knowledge of the patient's condition at the time of transport.    DS   If this box is checked, I also certify that the patient is physically or mentally incapable of signing the ambulance service's claim form and that the institution with which I am affiliated has furnished care, services or assistance to the patient.  My signature below is made on behalf of the patient pursuant to 42 .36(b)(4). In accordance with 42 .37, the specific reason(s) that the patient is physically or mentally incapable of signing the claim for is as follows: Confused    Signature of Physician or Healthcare Professional   Jessi Chavez RN Date/Time:   12/14/23  7906       (For " Scheduled repetitive transport, this form is not valid for transports performed more than 60 days after this date).                                                                                                                                            --------------------------------------------------------------------------------------------  Printed Name and Credentials of Physician or Authorized Healthcare Professional     *Form must be signed by patient's attending physician for scheduled, repetitive transports,.  For non-repetitive ambulance transports, if unable to obtain the signature of the attending physician, any of the following may sign (please select below):     Physician  Clinical Nurse Specialist  Registered Nurse x    Physician Assistant  Discharge Planner  Licensed Practical Nurse     Nurse Practitioner x

## 2023-12-14 NOTE — DISCHARGE SUMMARY
Discharge Summary    Date of Service:   Patient Name: Maddison Vasquez  : 1940  MRN: [id number]    Date of Admission: 2023  Discharge Diagnosis: Patient will be discharged to skilled nursing facility with hospice  Patient with acute kidney insufficiency on chronic with baseline of 1.5  Severe hyponatremia due to dehydration  Hyperkalemia that has resolved  History of diabetes and hypertension  Mental status change with underlying dementia and severe metabolic encephalopathy    Seen per hospice and agreeable to go to hospice care at the skilled nursing facility  Anemia of chronic disease    Date of Discharge:    Primary Care Physician: Provider, No Known      Presenting Problem:   Hyperkalemia [E87.5]  Hypernatremia [E87.0]  UTI (urinary tract infection), uncomplicated [N39.0]  URSULA (acute kidney injury) [N17.9]  Acute renal failure, unspecified acute renal failure type [N17.9]  Altered mental status, unspecified altered mental status type [R41.82]    Active and Resolved Hospital Problems:  Active Hospital Problems    Diagnosis POA    **URSULA (acute kidney injury) [N17.9] Yes      Resolved Hospital Problems   No resolved problems to display.         Hospital Course     Hospital Course:  Maddison Vasquez is a 83 y.o. female patient admitted with change mental status  Patient with left-sided weakness  Patient had severe hyponatremia    She was agitated  Kidney function has improved and has hypernatremia resolved  Family is interested in hospice  Patient be discharged to skilled nursing facility with hospice      DISCHARGE Follow Up Recommendations for labs and diagnostics: snf with hospice      Reasons For Change In Medications and Indications for New Medications:      Day of Discharge     Vital Signs:  Temp:  [97.2 °F (36.2 °C)-99 °F (37.2 °C)] 98.9 °F (37.2 °C)  Heart Rate:  [74-86] 74  Resp:  [14-25] 22  BP: (106-149)/() 106/50    Physical Exam:  Physical Exam   Confused  lethargic  HEENT exam dry mucous pain  Neck supple  Chest with few rhonchi in the bases  Heart response to murmur  Abdomen soft benign nontender bowel sounds positive      Pertinent  and/or Most Recent Results     LAB RESULTS:      Lab 12/10/23  0035 12/09/23  1323 12/08/23  1130 12/07/23  1359 12/07/23  1355   WBC 7.70 9.00 10.30  --  11.00*   HEMOGLOBIN 7.9* 8.4* 8.9*  --  9.0*   HEMATOCRIT 25.3* 26.2* 27.8*  --  28.1*   PLATELETS 279 333 351  --  358   NEUTROS ABS 5.39 6.00 7.60*  --  8.30*   LYMPHS ABS  --  1.70 1.70  --  1.40   MONOS ABS  --  1.10* 0.90  --  1.20*   EOS ABS 0.23 0.10 0.00  --  0.10   MCV 99.7* 102.0* 101.5*  --  99.7*   LACTATE  --   --  1.7 1.7  --          Lab 12/13/23  0220 12/12/23  0027 12/11/23  0024 12/10/23  0035 12/09/23  1323 12/08/23  1130   SODIUM 144 139 140 147* 155* 158*   POTASSIUM 4.2 4.9 4.3 4.3 4.5 4.8   CHLORIDE 112* 112* 109* 114* 122* 124*   CO2 21.0* 16.0* 18.0* 20.0* 22.0 20.0*   ANION GAP 11.0 11.0 13.0 13.0 11.0 14.0   BUN 39* 44* 56* 72* 76* 85*   CREATININE 1.24* 1.48* 1.40* 1.57* 1.82* 1.99*   EGFR 43.3* 35.0* 37.4* 32.6* 27.3* 24.5*   GLUCOSE 106* 177* 131* 169* 154* 180*   CALCIUM 8.8 8.4* 8.5* 8.9 9.2 9.4   MAGNESIUM  --   --   --  2.6* 2.9*  --    PHOSPHORUS  --   --   --  4.4 5.1*  --    HEMOGLOBIN A1C  --   --   --   --   --  6.00*         Lab 12/07/23  1355   TOTAL PROTEIN 8.1   ALBUMIN 3.5   GLOBULIN 4.6   ALT (SGPT) 35*   AST (SGOT) 27   BILIRUBIN 0.2   ALK PHOS 123*         Lab 12/08/23  1130   HSTROP T 41*         Lab 12/08/23  1130   CHOLESTEROL 110   LDL CHOL 58   HDL CHOL 28*   TRIGLYCERIDES 135         Lab 12/09/23  1323   IRON 43   IRON SATURATION (TSAT) 23   TIBC 189*   TRANSFERRIN 127*   FERRITIN 869.70*         Brief Urine Lab Results  (Last result in the past 365 days)        Color   Clarity   Blood   Leuk Est   Nitrite   Protein   CREAT   Urine HCG        12/12/23 1420 Yellow   Clear   Trace   Negative   Negative   30 mg/dL (1+)                  Microbiology Results (last 10 days)       Procedure Component Value - Date/Time    CANDIDA AURIS SCREEN - Swab, Axilla Right, Axilla Left and Groin [[id number]]  (Normal) Collected: 12/08/23 1131    Lab Status: Final result Specimen: Swab from Axilla Right, Axilla Left and Groin Updated: 12/13/23 1146     Candida Auris Screen Culture No Candida auris isolated at 5 days    Blood Culture - Blood, Arm, Left [[id number]]  (Normal) Collected: 12/07/23 1426    Lab Status: Final result Specimen: Blood from Arm, Left Updated: 12/12/23 1431     Blood Culture No growth at 5 days    Blood Culture - Blood, Arm, Left [[id number]]  (Normal) Collected: 12/07/23 1355    Lab Status: Final result Specimen: Blood from Arm, Left Updated: 12/12/23 1415     Blood Culture No growth at 5 days    Urine Culture - Urine, Straight Cath [[id number]]  (Abnormal)  (Susceptibility) Collected: 12/07/23 1355    Lab Status: Final result Specimen: Urine from Straight Cath Updated: 12/09/23 1551     Urine Culture >100,000 CFU/mL Escherichia coli    Narrative:      Colonization of the urinary tract without infection is common. Treatment is discouraged unless the patient is symptomatic, pregnant, or undergoing an invasive urologic procedure.    Susceptibility        Escherichia coli      ANGELITO      Ampicillin Susceptible      Ampicillin + Sulbactam Susceptible      Cefazolin Susceptible      Cefepime Susceptible      Ceftazidime Susceptible      Ceftriaxone Susceptible      Gentamicin Susceptible      Levofloxacin Intermediate      Nitrofurantoin Susceptible      Piperacillin + Tazobactam Susceptible      Trimethoprim + Sulfamethoxazole Susceptible                                   XR Spine Lumbar 2 or 3 View    Result Date: 12/7/2023  Impression: Impression: 1. Age-indeterminate moderate compression fracture at T12. 2. Question compression fracture at L5, the degree of severe osteopenia limits complete assessment. Lumbar spine CT may be helpful to further  assess 3. Compression fracture at T8 similar to prior chest CT. 4. Lumbar degenerative findings above. Electronically Signed: Daniel Woods MD  12/7/2023 11:59 PM EST  Workstation ID: [id number]    XR Hip With or Without Pelvis 2 - 3 View Left    Result Date: 12/7/2023  Impression: Impression: Allowing for bony demineralization, no evidence of acute displaced fracture. Electronically Signed: Marcel Kenny MD  12/7/2023 8:10 PM EST  Workstation ID: [id number]    XR Shoulder 2+ View Left    Result Date: 12/7/2023  Impression: Impression: No acute fracture or malalignment. Chronic fracture deformity of the proximal humerus with unremarkable appearance of ORIF hardware. Electronically Signed: Marcel Kenny MD  12/7/2023 7:57 PM EST  Workstation ID: [id number]    CT Head Without Contrast    Result Date: 12/7/2023  Impression: Impression: Somewhat limited study. Extensive chronic findings. No acute process identified. Electronically Signed: Oliva Curtis MD  12/7/2023 2:24 PM EST  Workstation ID: [id number]    XR Chest 1 View    Result Date: 12/7/2023  Impression: Impression: 1.No acute process identified. 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 No acute intracranial abnormality. Electronically Signed: Ankur Hurtado MD  12/2/2023 9:32 PM EST  Workstation ID: [id number]     Results for orders placed during the hospital encounter of 12/07/23    Duplex Venous Upper Extremity - Left CAR    Interpretation Summary    Acute left upper extremity superficial thrombophlebitis noted in the cephalic (upper arm) and cephalic (forearm).    All other left sided vessels appear normal.      Results for orders placed during the hospital encounter of 12/07/23    Duplex Venous Upper Extremity - Left  CAR    Interpretation Summary    Acute left upper extremity superficial thrombophlebitis noted in the cephalic (upper arm) and cephalic (forearm).    All other left sided vessels appear normal.          Labs Pending at Discharge:      Procedures Performed           Consults:   Consults       Date and Time Order Name Status Description    12/10/2023 10:41 AM Inpatient Psychiatrist Consult Completed     12/7/2023  4:09 PM Inpatient Neurology Consult Stroke      12/7/2023  4:09 PM Inpatient Neurology Consult Stroke Completed     12/7/2023  2:51 PM Hospitalist (on-call MD unless specified)                Discharge Details        Discharge Medications        ASK your doctor about these medications        Instructions Start Date   acetaminophen 325 MG tablet  Commonly known as: TYLENOL   650 mg, Oral, Every 6 Hours PRN      amLODIPine 10 MG tablet  Commonly known as: NORVASC   10 mg, Oral, Every Evening      atenolol 50 MG tablet  Commonly known as: TENORMIN   50 mg, Oral, Every Morning      bisacodyl 10 MG suppository  Commonly known as: DULCOLAX   10 mg, Rectal, Daily PRN      Breo Ellipta 100-25 MCG/INH aerosol powder   Generic drug: Fluticasone Furoate-Vilanterol   1 puff, Inhalation, Daily - RT      carBAMazepine 100 MG chewable tablet  Commonly known as: TEGretol   100 mg, Oral, 2 Times Daily      Divalproex Sodium 125 MG capsule  Commonly known as: DEPAKOTE SPRINKLE   250 mg, Oral, 3 times daily      ferrous sulfate 325 (65 FE) MG tablet   325 mg, Oral, Daily With Breakfast      fleet enema 7-19 GM/118ML enema   1 enema, Rectal, Once As Needed      furosemide 20 MG tablet  Commonly known as: LASIX   20 mg, Oral, Daily      magnesium hydroxide 400 MG/5ML suspension  Commonly known as: MILK OF MAGNESIA   30 mL, Oral, Daily PRN      melatonin 3 MG tablet   3 mg, Oral, Daily      potassium chloride 10 MEQ CR tablet   10 mEq, Oral, Daily      sertraline 25 MG tablet  Commonly known as: ZOLOFT   25 mg, Oral, Daily       traMADol 50 MG tablet  Commonly known as: ULTRAM   50 mg, Oral, Every 8 Hours PRN      vitamin D 1.25 MG (15557 UT) capsule capsule  Commonly known as: ERGOCALCIFEROL   50,000 Units, Oral, Weekly, Tues               No Known Allergies      Discharge Disposition: 35      Diet:  Hospital:  Diet Order   Procedures    Diet: Regular/House Diet; Texture: Pureed (NDD 1); Fluid Consistency: Thin (IDDSI 0)         Discharge Activity:         CODE STATUS:  Code Status and Medical Interventions:   Ordered at: 12/07/23 1609     Level Of Support Discussed With:    Health Care Surrogate     Code Status (Patient has no pulse and is not breathing):    No CPR (Do Not Attempt to Resuscitate)     Medical Interventions (Patient has pulse or is breathing):    Full Support         No future appointments.        Time spent on Discharge including face to face service:  35 minutes        Signature: Electronically signed by Gisela Dumas MD, 12/14/23, 11:19 EST.  [facility] Hospitalist Team

## 2023-12-14 NOTE — THERAPY TREATMENT NOTE
"Subjective: Pt agreeable to therapeutic plan of care.    Objective:     Bed mobility - Max-A  Transfers - N/A or Not attempted.  Ambulation - m/a feet N/A or Not attempted.    Neuro/Balance:  Seated balance EOB noted significant R lateral lean w/ pt utilizing bedrail to pull self R. Pt unable to correct with VC. Pt cued to complete RUE contralateral reaching for Boost to allow for midline posture. Pt able to come to near midline and maintain while drinking boost.       Vitals: WNL    Pain: unable to quantify, reports pain/soreness VAS   Location LLE/LUE  Intervention for pain: Repositioned    Education: Provided education on the importance of mobility in the acute care setting, Verbal/Tactile Cues, and Transfer Training    Assessment: Maddison Vasquez presents with functional mobility impairments which indicate the need for skilled intervention. Pt continues to be confused and oriented to self only. Pt able to verbalize yes/no. Pt agreeable to sitting EOB. Requires max a for bed mobility. Max VC/TC and assist to come to midline. Pt unable to follow meaningful directions this date. Attempted CTS form EOB with pt unable or unwilling to participate in. Pt returned to supine in bed and all needs met.  Tolerating session today without incident. Will continue to follow and progress as tolerated.     Plan/Recommendations:   If medically appropriate, Moderate Intensity Therapy recommended post-acute care. This is recommended as therapy feels the patient would require 3-4 days per week and wouldn't tolerate \"3 hour daily\" rehab intensity. SNF would be the preferred choice. If the patient does not agree to SNF, arrange HH or OP depending on home bound status. If patient is medically complex, consider LTACH. Pt requires no DME at discharge.     Pt desires Skilled Rehab placement at discharge. Pt cooperative; agreeable to therapeutic recommendations and plan of care.         Basic Mobility 6-click:  Rollin = Total, " A lot = 2, A little = 3; 4 = None  Supine>Sit:   1 = Total, A lot = 2, A little = 3; 4 = None   Sit>Stand with arms:  1 = Total, A lot = 2, A little = 3; 4 = None  Bed>Chair:   1 = Total, A lot = 2, A little = 3; 4 = None  Ambulate in room:  1 = Total, A lot = 2, A little = 3; 4 = None  3-5 Steps with railin = Total, A lot = 2, A little = 3; 4 = None  Score: 6    Modified [place]: 5 = Severe disability (Requires constant nursing care and attention, bedridden, incontinent)    Post-Tx Position: Supine with HOB Elevated, Alarms activated, and Call light and personal items within reach  PPE: gloves and gown

## 2023-12-15 NOTE — NURSING NOTE
Patient was transferred to [facility] by the EMS at 2000 hr. She was conscious and alert and not in any obvious distress at  the time of discharge. Cook Sta was notified of the patient transport. Family, Ila Lancaster, was also notified of the discharge. Belongings (clothes) were sent along with patient.

## 2023-12-15 NOTE — CASE MANAGEMENT/SOCIAL WORK
Case Management Discharge Note      Final Note: Return to LTC at [facility]         Selected Continued Care - Discharged on 12/14/2023 Admission date: 12/7/2023 - Discharge disposition: Skilled Nursing Facility (DC - External)      Destination Coordination complete.      Service Provider Selected Services Address Phone Fax Patient Preferred    [facility] [address] [place] [address] [phone] --                  Home Medical Care Coordination complete.      Service Provider Selected Services Address Phone Fax Patient Preferred    [facility] [address] [place] [address] [phone] --                 Transportation Services  Ambulance: [facility]    Final Discharge Disposition Code: 04 - intermediate care facility

## (undated) DEVICE — GLV SURG DERMASSURE GRN LF PF 8.5

## (undated) DEVICE — BNDG ELAS MATRX  2IN 5YD LF STRL

## (undated) DEVICE — CUFF TOURNI 1BLADDER 1PRT 18IN STRL

## (undated) DEVICE — GLV SURG SENSICARE PI ORTHO SZ8.5 LF STRL

## (undated) DEVICE — TOWEL,OR,DSP,ST,WHITE,DLX,4/PK,20PK/CS: Brand: MEDLINE

## (undated) DEVICE — DRILL, WL 41MM, AO-SHAFT: Brand: PROFYLE

## (undated) DEVICE — DRILL BIT LOCKING, SHORT: Brand: AXSOS

## (undated) DEVICE — C-ARM: Brand: UNBRANDED

## (undated) DEVICE — SOLUTION,WATER,IRRIGATION,1000ML,STERILE: Brand: MEDLINE

## (undated) DEVICE — 3M™ STERI-DRAPE™ INSTRUMENT POUCH 9097: Brand: 3M™ STERI-DRAPE™

## (undated) DEVICE — SUT VIC 3/0 FS1 27IN J442H

## (undated) DEVICE — GOWN,REINFORCE,POLY,SIRUS,BREATH SLV,XLG: Brand: MEDLINE

## (undated) DEVICE — UNDRGLV SURG BIOGEL PUNCTUREINDICATION SZ7 PF STRL

## (undated) DEVICE — 3M™ STERI-DRAPE™ U-DRAPE 1015: Brand: STERI-DRAPE™

## (undated) DEVICE — KT SURG TURNOVER 050

## (undated) DEVICE — LOCKING SCREW
Type: IMPLANTABLE DEVICE | Site: HUMERUS | Status: NON-FUNCTIONAL
Brand: AXSOS
Removed: 2022-01-28

## (undated) DEVICE — SOL ISO/ALC RUB 70PCT 4OZ

## (undated) DEVICE — UNDERGLV SURG BIOGEL INDICAT PI SZ8.5 BLU

## (undated) DEVICE — DRILL BIT NON-LOCKING, SHORT: Brand: AXSOS

## (undated) DEVICE — KIRSCHNER WIRE
Type: IMPLANTABLE DEVICE | Site: HUMERUS | Status: NON-FUNCTIONAL
Removed: 2022-01-28

## (undated) DEVICE — APPL CHLORAPREP HI/LITE 26ML ORNG

## (undated) DEVICE — APPL CHLORAPREP HI/LITE TINTED 10.5ML ORNG

## (undated) DEVICE — SUT VIC 2/0 CT2 27IN J269H

## (undated) DEVICE — STAPLER 35 WIDE: Brand: MEDLINE INDUSTRIES, INC.

## (undated) DEVICE — GAUZE,SPONGE,FLUFF,6"X6.75",STRL,5/TRAY: Brand: MEDLINE

## (undated) DEVICE — NDL SUT MARTIN 1/2 CIR NO7 18607DG PK/2

## (undated) DEVICE — SOL IRRIG NACL 1000ML

## (undated) DEVICE — SPNG LAP PREWSH SFTPK 18X18IN STRL PK/5

## (undated) DEVICE — GLV SURG SIGNATURE ESSENTIAL PF LTX SZ8.5

## (undated) DEVICE — STAPLER, SKIN, 35W, A: Brand: MEDLINE INDUSTRIES, INC.

## (undated) DEVICE — UNDYED BRAIDED (POLYGLACTIN 910), SYNTHETIC ABSORBABLE SUTURE: Brand: COATED VICRYL

## (undated) DEVICE — KIRSCHNER WIRE
Type: IMPLANTABLE DEVICE | Site: RADIUS | Status: NON-FUNCTIONAL
Brand: VARIAX
Removed: 2022-01-28

## (undated) DEVICE — GLV SURG SENSICARE PI ORTHO PF SZ7 LF STRL

## (undated) DEVICE — 3M™ STERI-DRAPE™ INSTRUMENT POUCH 1018: Brand: STERI-DRAPE™

## (undated) DEVICE — PK EXTREM 50